# Patient Record
Sex: FEMALE | Race: WHITE | HISPANIC OR LATINO | Employment: FULL TIME
[De-identification: names, ages, dates, MRNs, and addresses within clinical notes are randomized per-mention and may not be internally consistent; named-entity substitution may affect disease eponyms.]

---

## 2019-06-07 ENCOUNTER — HOSPITAL (OUTPATIENT)
Dept: OTHER | Age: 34
End: 2019-06-07
Attending: OBSTETRICS & GYNECOLOGY

## 2019-10-11 ENCOUNTER — HOSPITAL (OUTPATIENT)
Dept: OTHER | Age: 34
End: 2019-10-11
Attending: FAMILY MEDICINE

## 2019-10-12 ENCOUNTER — HOSPITAL (OUTPATIENT)
Dept: OTHER | Age: 34
End: 2019-10-12
Attending: FAMILY MEDICINE

## 2020-01-24 ENCOUNTER — HOSPITAL (OUTPATIENT)
Dept: OTHER | Age: 35
End: 2020-01-24
Attending: EMERGENCY MEDICINE

## 2020-01-24 LAB
CONTROL LINE: PRESENT
Lab: CLEAR
URINE PREG POC: NEGATIVE

## 2020-03-20 ENCOUNTER — V-VISIT (OUTPATIENT)
Dept: FAMILY MEDICINE | Age: 35
End: 2020-03-20

## 2020-03-20 DIAGNOSIS — N93.9 VAGINAL BLEEDING: Primary | ICD-10-CM

## 2020-03-20 DIAGNOSIS — Z71.89 ADVICE GIVEN ABOUT COVID-19 VIRUS BY TELEPHONE: ICD-10-CM

## 2020-03-20 PROBLEM — J45.20 MILD INTERMITTENT ASTHMA WITHOUT COMPLICATION: Status: ACTIVE | Noted: 2020-03-20

## 2020-03-20 PROBLEM — E11.9 TYPE 2 DIABETES MELLITUS WITHOUT COMPLICATION (CMD): Status: ACTIVE | Noted: 2020-03-20

## 2020-03-20 PROBLEM — K51.90 ULCERATIVE COLITIS (CMD): Status: ACTIVE | Noted: 2020-03-20

## 2020-03-20 PROCEDURE — 99441 TELEPHONE E&M BY PHYSICIAN EST PT NOT ORIG PREV 7 DAYS 5-10 MIN: CPT | Performed by: NURSE PRACTITIONER

## 2020-03-20 RX ORDER — ALBUTEROL SULFATE 90 UG/1
2 AEROSOL, METERED RESPIRATORY (INHALATION) EVERY 4 HOURS PRN
COMMUNITY

## 2020-05-03 ENCOUNTER — HOSPITAL (OUTPATIENT)
Dept: OTHER | Age: 35
End: 2020-05-03
Attending: PHYSICIAN ASSISTANT

## 2020-05-03 LAB
ABS LYMPH: 2.4 K/CUMM (ref 1–3.5)
ABS MONO: 0.8 K/CUMM (ref 0.1–0.8)
ABS NEUTRO: 6 K/CUMM (ref 2–8)
BASOPHIL: 1 % (ref 0–1)
CONTROL LINE: PRESENT
DIFF_TYPE?: NORMAL
EOSINOPHIL: 3 % (ref 0–6)
HCG QUANT: <1 MIU/ML
HCT VFR BLD CALC: 43 % (ref 33–45)
HEMOLYSIS 4+: NEGATIVE
HGB BLD-MCNC: 14.1 G/DL (ref 11–15)
ICTERIC 4+: NEGATIVE
IMMATURE GRAN: 0.8 % (ref 0–0.3)
INSTR WBC: 9.7 K/CUMM (ref 4–11)
LIPEMIC 4+: NEGATIVE
LYMPHOCYTE: 24 %
Lab: CLEAR
MCH RBC QN AUTO: 28 PG (ref 25–35)
MCHC RBC AUTO-ENTMCNC: 33 G/DL (ref 32–37)
MCV RBC AUTO: 84 FL (ref 78–97)
MONOCYTE: 8 %
NEUTROPHIL: 62 %
NRBC BLD MANUAL-RTO: 0 % (ref 0–0.2)
PLATELET: 343 K/CUMM (ref 150–450)
RBC # BLD: 5.07 M/CUMM (ref 3.7–5.2)
RDW: 13.6 % (ref 11.5–14.5)
URINE PREG POC: NEGATIVE
WBC # BLD: 9.7 K/CUMM (ref 4–11)

## 2020-09-26 ENCOUNTER — HOSPITAL (OUTPATIENT)
Dept: OTHER | Age: 35
End: 2020-09-26
Attending: NURSE PRACTITIONER

## 2020-09-26 LAB
CHLAMYDIA PROBE: NEGATIVE
CONTROL LINE: PRESENT
CONTROL LINE: PRESENT
DEVICE SN: ABNORMAL
GC PROBE: NEGATIVE
Lab: CLEAR
Lab: CLEAR
N GON SOURCE: NORMAL
POC_GLU: 314 MG/DL (ref 70–99)
POC_GLU: 314 MG/DL (ref 70–99)
RMEPNL SOURCE: NORMAL
TECH_ID: ABNORMAL
TRICH POC: NORMAL
UA APPEAR POC: ABNORMAL
UA APPEAR POC: ABNORMAL
UA BILI POC: NEGATIVE
UA BILI POC: NEGATIVE
UA BLOOD POC: NEGATIVE
UA BLOOD POC: NEGATIVE
UA COLOR POC: YELLOW
UA COLOR POC: YELLOW
UA GLUCOSE POC: ABNORMAL
UA GLUCOSE POC: ABNORMAL
UA KETONES POC: ABNORMAL
UA KETONES POC: ABNORMAL
UA LEUK EST POC: NEGATIVE
UA LEUK EST POC: NEGATIVE
UA NITRITE POC: NEGATIVE
UA NITRITE POC: NEGATIVE
UA PH POC: 7 (ref 5–7)
UA PH POC: 7 (ref 5–7)
UA PROTEIN POC: NEGATIVE
UA PROTEIN POC: NEGATIVE
UA SPEC GRAV POC: 1.02 (ref 1.01–1.02)
UA SPEC GRAV POC: 1.02 (ref 1.01–1.02)
UA UROBILIN POC: 0.2 MG/DL
UA UROBILIN POC: 0.2 MG/DL
URINE PREG POC: NEGATIVE
URINE PREG POC: NEGATIVE

## 2020-09-30 LAB
CHLAMYDIA PROBE: NEGATIVE
GC PROBE: NEGATIVE
N GON SOURCE: NORMAL

## 2021-03-17 ENCOUNTER — WALK IN (OUTPATIENT)
Dept: URGENT CARE | Age: 36
End: 2021-03-17
Attending: FAMILY MEDICINE

## 2021-03-17 ENCOUNTER — HOSPITAL ENCOUNTER (OUTPATIENT)
Dept: GENERAL RADIOLOGY | Age: 36
Discharge: HOME | End: 2021-03-17
Attending: FAMILY MEDICINE

## 2021-03-17 DIAGNOSIS — S69.91XA INJURY OF RIGHT WRIST, INITIAL ENCOUNTER: ICD-10-CM

## 2021-03-17 DIAGNOSIS — S63.501A SPRAIN OF RIGHT WRIST, INITIAL ENCOUNTER: Primary | ICD-10-CM

## 2021-03-17 PROCEDURE — 99213 OFFICE O/P EST LOW 20 MIN: CPT

## 2021-03-17 PROCEDURE — 73110 X-RAY EXAM OF WRIST: CPT

## 2021-03-17 ASSESSMENT — PAIN SCALES - GENERAL: PAINLEVEL: 9-10

## 2021-03-20 ENCOUNTER — WALK IN (OUTPATIENT)
Dept: URGENT CARE | Age: 36
End: 2021-03-20
Attending: FAMILY MEDICINE

## 2021-03-20 DIAGNOSIS — N89.8 VAGINAL DISCHARGE: ICD-10-CM

## 2021-03-20 DIAGNOSIS — B00.9 HERPES SIMPLEX TYPE 2 INFECTION: Primary | ICD-10-CM

## 2021-03-20 DIAGNOSIS — R82.4 URINE KETONES: ICD-10-CM

## 2021-03-20 LAB
APPEARANCE, POC: CLEAR
B-HCG UR QL: NEGATIVE
BILIRUBIN, POC: NEGATIVE
COLOR, POC: YELLOW
FASTING STATUS PATIENT QL REPORTED: ABNORMAL
GLUCOSE SERPL-MCNC: 323 MG/DL (ref 65–99)
GLUCOSE UR-MCNC: NORMAL MG/DL
INTERNAL PROCEDURAL CONTROLS ACCEPTABLE: YES
KETONES, POC: NORMAL
NITRITE, POC: NEGATIVE
OCCULT BLOOD, POC: NORMAL
PH UR: 5.5 [PH] (ref 5–9)
PROT UR-MCNC: NEGATIVE G/DL
SP GR UR: 1.02 (ref 1–1.03)
UROBILINOGEN UR-MCNC: 0.2 MG/DL (ref 0–1)
WBC (LEUKOCYTE) ESTERASE, POC: NEGATIVE

## 2021-03-20 PROCEDURE — 87529 HSV DNA AMP PROBE: CPT | Performed by: PHYSICIAN ASSISTANT

## 2021-03-20 PROCEDURE — 81025 URINE PREGNANCY TEST: CPT | Performed by: PHYSICIAN ASSISTANT

## 2021-03-20 PROCEDURE — 82962 GLUCOSE BLOOD TEST: CPT | Performed by: PHYSICIAN ASSISTANT

## 2021-03-20 PROCEDURE — 81003 URINALYSIS AUTO W/O SCOPE: CPT | Performed by: PHYSICIAN ASSISTANT

## 2021-03-20 PROCEDURE — 99213 OFFICE O/P EST LOW 20 MIN: CPT

## 2021-03-20 RX ORDER — VALACYCLOVIR HYDROCHLORIDE 1 G/1
1000 TABLET, FILM COATED ORAL 3 TIMES DAILY
Qty: 21 TABLET | Refills: 0 | Status: SHIPPED | OUTPATIENT
Start: 2021-03-20 | End: 2021-03-27

## 2021-03-20 ASSESSMENT — PAIN SCALES - GENERAL: PAINLEVEL: 9-10

## 2021-03-21 LAB
HSV1 DNA SPEC QL NAA+PROBE: NOT DETECTED
HSV2 DNA SPEC QL NAA+PROBE: POSITIVE
SERVICE CMNT-IMP: ABNORMAL

## 2021-05-26 VITALS
HEART RATE: 98 BPM | WEIGHT: 152 LBS | OXYGEN SATURATION: 99 % | RESPIRATION RATE: 20 BRPM | HEART RATE: 86 BPM | SYSTOLIC BLOOD PRESSURE: 124 MMHG | BODY MASS INDEX: 30.64 KG/M2 | DIASTOLIC BLOOD PRESSURE: 80 MMHG | RESPIRATION RATE: 18 BRPM | SYSTOLIC BLOOD PRESSURE: 137 MMHG | OXYGEN SATURATION: 99 % | HEIGHT: 59 IN | TEMPERATURE: 98 F | DIASTOLIC BLOOD PRESSURE: 89 MMHG | TEMPERATURE: 97.8 F

## 2021-06-09 ENCOUNTER — APPOINTMENT (OUTPATIENT)
Dept: CT IMAGING | Facility: HOSPITAL | Age: 36
End: 2021-06-09
Attending: EMERGENCY MEDICINE
Payer: MEDICAID

## 2021-06-09 ENCOUNTER — HOSPITAL ENCOUNTER (EMERGENCY)
Facility: HOSPITAL | Age: 36
Discharge: HOME | End: 2021-06-09
Attending: EMERGENCY MEDICINE
Payer: MEDICAID

## 2021-06-09 VITALS
OXYGEN SATURATION: 99 % | RESPIRATION RATE: 18 BRPM | DIASTOLIC BLOOD PRESSURE: 72 MMHG | HEART RATE: 66 BPM | HEIGHT: 59 IN | BODY MASS INDEX: 31.04 KG/M2 | TEMPERATURE: 97 F | WEIGHT: 154 LBS | SYSTOLIC BLOOD PRESSURE: 111 MMHG

## 2021-06-09 DIAGNOSIS — R10.9 ABDOMINAL PAIN, ACUTE: Primary | ICD-10-CM

## 2021-06-09 DIAGNOSIS — R11.2 NAUSEA AND VOMITING, INTRACTABILITY OF VOMITING NOT SPECIFIED, UNSPECIFIED VOMITING TYPE: ICD-10-CM

## 2021-06-09 DIAGNOSIS — R19.7 DIARRHEA, UNSPECIFIED TYPE: ICD-10-CM

## 2021-06-09 PROCEDURE — 81025 URINE PREGNANCY TEST: CPT

## 2021-06-09 PROCEDURE — 96374 THER/PROPH/DIAG INJ IV PUSH: CPT

## 2021-06-09 PROCEDURE — 82962 GLUCOSE BLOOD TEST: CPT

## 2021-06-09 PROCEDURE — 85025 COMPLETE CBC W/AUTO DIFF WBC: CPT | Performed by: EMERGENCY MEDICINE

## 2021-06-09 PROCEDURE — 83690 ASSAY OF LIPASE: CPT | Performed by: EMERGENCY MEDICINE

## 2021-06-09 PROCEDURE — 96372 THER/PROPH/DIAG INJ SC/IM: CPT

## 2021-06-09 PROCEDURE — 99284 EMERGENCY DEPT VISIT MOD MDM: CPT

## 2021-06-09 PROCEDURE — 74177 CT ABD & PELVIS W/CONTRAST: CPT | Performed by: EMERGENCY MEDICINE

## 2021-06-09 PROCEDURE — 96361 HYDRATE IV INFUSION ADD-ON: CPT

## 2021-06-09 PROCEDURE — 80053 COMPREHEN METABOLIC PANEL: CPT | Performed by: EMERGENCY MEDICINE

## 2021-06-09 PROCEDURE — 81001 URINALYSIS AUTO W/SCOPE: CPT | Performed by: EMERGENCY MEDICINE

## 2021-06-09 RX ORDER — ONDANSETRON 4 MG/1
4 TABLET, ORALLY DISINTEGRATING ORAL EVERY 4 HOURS PRN
Qty: 10 TABLET | Refills: 0 | Status: SHIPPED | OUTPATIENT
Start: 2021-06-09 | End: 2021-06-16

## 2021-06-09 RX ORDER — SODIUM CHLORIDE 9 MG/ML
1000 INJECTION, SOLUTION INTRAVENOUS ONCE
Status: COMPLETED | OUTPATIENT
Start: 2021-06-09 | End: 2021-06-09

## 2021-06-09 RX ORDER — DICYCLOMINE HYDROCHLORIDE 10 MG/ML
20 INJECTION INTRAMUSCULAR ONCE
Status: COMPLETED | OUTPATIENT
Start: 2021-06-09 | End: 2021-06-09

## 2021-06-09 RX ORDER — ONDANSETRON 2 MG/ML
4 INJECTION INTRAMUSCULAR; INTRAVENOUS ONCE
Status: COMPLETED | OUTPATIENT
Start: 2021-06-09 | End: 2021-06-09

## 2021-06-09 RX ORDER — DICYCLOMINE HCL 20 MG
20 TABLET ORAL 4 TIMES DAILY PRN
Qty: 15 TABLET | Refills: 0 | Status: SHIPPED | OUTPATIENT
Start: 2021-06-09

## 2021-06-09 NOTE — ED PROVIDER NOTES
Patient Seen in: [facility] Emergency Department      History   Patient presents with:  Abdominal Pain    Stated Complaint: abd pain     HPI/Subjective:   HPI    Patient is a 43-year-old female presents emergency room with a history of nausea vomiti Exam  GENERAL: Well-developed, well-nourished female sitting up breathing easily in no apparent distress. Patient is nontoxic in appearance. HEENT: Head is normocephalic, atraumatic. Pupils are 3 mm equally round and reactive to light.  Oropharynx is brielle orders were created for panel order CBC With Differential With Platelet.   Procedure                               Abnormality         Status                     ---------                               -----------         ------                     CBC W/ D vomiting not specified, unspecified vomiting type  Diarrhea, unspecified type     Disposition:  Discharge  6/9/2021 12:56 pm    Follow-up:  Jhonny Yancey MD  [address] [place] [address] [phone]    Call in 2 days  or call y

## 2021-07-18 ENCOUNTER — HOSPITAL ENCOUNTER (OUTPATIENT)
Dept: GENERAL RADIOLOGY | Age: 36
Discharge: HOME | End: 2021-07-18
Attending: FAMILY MEDICINE

## 2021-07-18 ENCOUNTER — WALK IN (OUTPATIENT)
Dept: URGENT CARE | Age: 36
End: 2021-07-18
Attending: FAMILY MEDICINE

## 2021-07-18 VITALS
OXYGEN SATURATION: 99 % | RESPIRATION RATE: 20 BRPM | SYSTOLIC BLOOD PRESSURE: 118 MMHG | DIASTOLIC BLOOD PRESSURE: 77 MMHG | HEART RATE: 84 BPM | TEMPERATURE: 97.8 F

## 2021-07-18 DIAGNOSIS — M25.562 ACUTE PAIN OF LEFT KNEE: ICD-10-CM

## 2021-07-18 DIAGNOSIS — M25.562 ACUTE PAIN OF LEFT KNEE: Primary | ICD-10-CM

## 2021-07-18 PROCEDURE — 73562 X-RAY EXAM OF KNEE 3: CPT

## 2021-07-18 PROCEDURE — 99213 OFFICE O/P EST LOW 20 MIN: CPT

## 2021-07-18 RX ORDER — TOPIRAMATE 100 MG
100 TABLET ORAL DAILY
COMMUNITY
Start: 2021-06-28

## 2021-07-18 RX ORDER — INSULIN LISPRO 100 [IU]/ML
INJECTION, SOLUTION INTRAVENOUS; SUBCUTANEOUS
COMMUNITY
Start: 2021-07-06

## 2021-07-18 RX ORDER — FLUTICASONE PROPIONATE 50 MCG
SPRAY, SUSPENSION (ML) NASAL
COMMUNITY
Start: 2021-06-08

## 2021-07-18 RX ORDER — LORATADINE 10 MG/1
10 TABLET ORAL DAILY PRN
COMMUNITY
Start: 2021-06-08

## 2021-07-18 RX ORDER — ERGOCALCIFEROL 1.25 MG/1
CAPSULE ORAL
COMMUNITY
Start: 2021-07-11

## 2021-07-18 ASSESSMENT — ENCOUNTER SYMPTOMS
VOMITING: 0
SHORTNESS OF BREATH: 0
POLYPHAGIA: 0
CONSTIPATION: 0
BACK PAIN: 0
HEADACHES: 0
POLYDIPSIA: 0
NERVOUS/ANXIOUS: 0
WOUND: 0
VOICE CHANGE: 0
BRUISES/BLEEDS EASILY: 0
COLOR CHANGE: 0
SINUS PAIN: 0
SORE THROAT: 0
ABDOMINAL PAIN: 0
EYE DISCHARGE: 0
EYE PAIN: 0
SPEECH DIFFICULTY: 0
FATIGUE: 0
DIARRHEA: 0
EYE REDNESS: 0
WEAKNESS: 0
ADENOPATHY: 0
FEVER: 0
DIZZINESS: 0
CHEST TIGHTNESS: 0
NAUSEA: 0
BLOOD IN STOOL: 0
COUGH: 0
WHEEZING: 0
SINUS PRESSURE: 0
STRIDOR: 0
AGITATION: 0

## 2021-07-18 ASSESSMENT — PAIN SCALES - GENERAL: PAINLEVEL: 7

## 2021-07-20 ENCOUNTER — WALK IN (OUTPATIENT)
Dept: URGENT CARE | Age: 36
End: 2021-07-20
Attending: FAMILY MEDICINE

## 2021-07-20 VITALS
SYSTOLIC BLOOD PRESSURE: 135 MMHG | WEIGHT: 151 LBS | BODY MASS INDEX: 30.44 KG/M2 | DIASTOLIC BLOOD PRESSURE: 92 MMHG | RESPIRATION RATE: 18 BRPM | OXYGEN SATURATION: 99 % | HEART RATE: 97 BPM | TEMPERATURE: 98.1 F | HEIGHT: 59 IN

## 2021-07-20 DIAGNOSIS — Q68.2 PATELLA ALTA: Primary | ICD-10-CM

## 2021-07-20 PROCEDURE — 29505 APPLICATION LONG LEG SPLINT: CPT

## 2021-07-20 PROCEDURE — 99213 OFFICE O/P EST LOW 20 MIN: CPT

## 2021-07-20 ASSESSMENT — ENCOUNTER SYMPTOMS
PSYCHIATRIC NEGATIVE: 1
HEMATOLOGIC/LYMPHATIC NEGATIVE: 1
SHORTNESS OF BREATH: 0
ENDOCRINE NEGATIVE: 1
EYE DISCHARGE: 0
CHEST TIGHTNESS: 0
WEAKNESS: 0
ABDOMINAL PAIN: 0
CHILLS: 0
DIZZINESS: 0
FEVER: 0
VOMITING: 0
NAUSEA: 0

## 2021-07-20 ASSESSMENT — PAIN SCALES - GENERAL: PAINLEVEL: 8

## 2021-07-20 ASSESSMENT — VISUAL ACUITY: OU: 1

## 2021-09-20 ENCOUNTER — HOSPITAL ENCOUNTER (EMERGENCY)
Facility: HOSPITAL | Age: 36
Discharge: HOME | End: 2021-09-20
Attending: EMERGENCY MEDICINE
Payer: MEDICAID

## 2021-09-20 VITALS
BODY MASS INDEX: 31.85 KG/M2 | HEART RATE: 86 BPM | HEIGHT: 59 IN | DIASTOLIC BLOOD PRESSURE: 88 MMHG | OXYGEN SATURATION: 100 % | SYSTOLIC BLOOD PRESSURE: 128 MMHG | TEMPERATURE: 98 F | WEIGHT: 158 LBS | RESPIRATION RATE: 18 BRPM

## 2021-09-20 VITALS
OXYGEN SATURATION: 100 % | TEMPERATURE: 98 F | DIASTOLIC BLOOD PRESSURE: 91 MMHG | BODY MASS INDEX: 31.85 KG/M2 | RESPIRATION RATE: 18 BRPM | WEIGHT: 158 LBS | HEIGHT: 59 IN | HEART RATE: 90 BPM | SYSTOLIC BLOOD PRESSURE: 146 MMHG

## 2021-09-20 DIAGNOSIS — N93.8 DYSFUNCTIONAL UTERINE BLEEDING: Primary | ICD-10-CM

## 2021-09-20 LAB
ALBUMIN SERPL-MCNC: 3.3 G/DL (ref 3.4–5)
ALBUMIN/GLOB SERPL: 0.8 {RATIO} (ref 1–2)
ALP LIVER SERPL-CCNC: 75 U/L
ALT SERPL-CCNC: 29 U/L
ANION GAP SERPL CALC-SCNC: 7 MMOL/L (ref 0–18)
AST SERPL-CCNC: 17 U/L (ref 15–37)
BASOPHILS # BLD AUTO: 0.08 X10(3) UL (ref 0–0.2)
BASOPHILS NFR BLD AUTO: 0.8 %
BILIRUB SERPL-MCNC: 0.3 MG/DL (ref 0.1–2)
BUN BLD-MCNC: 7 MG/DL (ref 7–18)
CALCIUM BLD-MCNC: 8.6 MG/DL (ref 8.5–10.1)
CHLORIDE SERPL-SCNC: 106 MMOL/L (ref 98–112)
CO2 SERPL-SCNC: 23 MMOL/L (ref 21–32)
CREAT BLD-MCNC: 0.57 MG/DL
EOSINOPHIL # BLD AUTO: 0.33 X10(3) UL (ref 0–0.7)
EOSINOPHIL NFR BLD AUTO: 3.2 %
ERYTHROCYTE [DISTWIDTH] IN BLOOD BY AUTOMATED COUNT: 12.9 %
GLOBULIN PLAS-MCNC: 3.9 G/DL (ref 2.8–4.4)
GLUCOSE BLD-MCNC: 235 MG/DL (ref 70–99)
HCT VFR BLD AUTO: 33.7 %
HGB BLD-MCNC: 11.3 G/DL
IMM GRANULOCYTES # BLD AUTO: 0.07 X10(3) UL (ref 0–1)
IMM GRANULOCYTES NFR BLD: 0.7 %
LYMPHOCYTES # BLD AUTO: 2.85 X10(3) UL (ref 1–4)
LYMPHOCYTES NFR BLD AUTO: 27.9 %
MCH RBC QN AUTO: 27.9 PG (ref 26–34)
MCHC RBC AUTO-ENTMCNC: 33.5 G/DL (ref 31–37)
MCV RBC AUTO: 83.2 FL
MONOCYTES # BLD AUTO: 0.56 X10(3) UL (ref 0.1–1)
MONOCYTES NFR BLD AUTO: 5.5 %
NEUTROPHILS # BLD AUTO: 6.33 X10 (3) UL (ref 1.5–7.7)
NEUTROPHILS # BLD AUTO: 6.33 X10(3) UL (ref 1.5–7.7)
NEUTROPHILS NFR BLD AUTO: 61.9 %
OSMOLALITY SERPL CALC.SUM OF ELEC: 288 MOSM/KG (ref 275–295)
PLATELET # BLD AUTO: 344 10(3)UL (ref 150–450)
POTASSIUM SERPL-SCNC: 3.9 MMOL/L (ref 3.5–5.1)
PROT SERPL-MCNC: 7.2 G/DL (ref 6.4–8.2)
RBC # BLD AUTO: 4.05 X10(6)UL
SODIUM SERPL-SCNC: 136 MMOL/L (ref 136–145)
WBC # BLD AUTO: 10.2 X10(3) UL (ref 4–11)

## 2021-09-20 PROCEDURE — 85025 COMPLETE CBC W/AUTO DIFF WBC: CPT | Performed by: EMERGENCY MEDICINE

## 2021-09-20 PROCEDURE — 99284 EMERGENCY DEPT VISIT MOD MDM: CPT

## 2021-09-20 PROCEDURE — 96360 HYDRATION IV INFUSION INIT: CPT

## 2021-09-20 PROCEDURE — 80053 COMPREHEN METABOLIC PANEL: CPT | Performed by: EMERGENCY MEDICINE

## 2021-09-20 RX ORDER — ERGOCALCIFEROL 1.25 MG/1
CAPSULE ORAL
COMMUNITY

## 2021-09-20 RX ORDER — INSULIN LISPRO 100 [IU]/ML
5 INJECTION, SOLUTION INTRAVENOUS; SUBCUTANEOUS
COMMUNITY

## 2021-09-20 NOTE — ED PROVIDER NOTES
Patient Seen in: [facility] Emergency Department      History   Patient presents with:  Eval-G    Stated Complaint: vag bleeding since June    Subjective:   HPI    41-year-old female comes to the hospital complaint of having difficulty with having v supple, no JVD, trachea midline, No LAD  Heart: S1S2 normal. No murmurs, regular rate and rhythm  Lungs: Clear to auscultation bilaterally  Abdomen: Soft nontender nondistended normal active bowel sounds without rebound, guarding or masses noted  Back nont customary discharge instuctions were discussed given the patient's ER course. We discussed signs and symptoms that should prompt the patient's immediate return to the emergency department.    Reasonable over the counter and prescription treatment options a

## 2021-09-21 NOTE — ED INITIAL ASSESSMENT (HPI)
Pt with c/o lower abd pain with dry heaving. Seen here this morning for bleeding. Due to have bx on Wed this wk. .increased abd pain

## 2022-01-24 ENCOUNTER — HOSPITAL ENCOUNTER (EMERGENCY)
Facility: HOSPITAL | Age: 37
Discharge: HOME | End: 2022-01-24
Attending: EMERGENCY MEDICINE
Payer: MEDICAID

## 2022-01-24 ENCOUNTER — APPOINTMENT (OUTPATIENT)
Dept: CT IMAGING | Facility: HOSPITAL | Age: 37
End: 2022-01-24
Attending: EMERGENCY MEDICINE
Payer: MEDICAID

## 2022-01-24 VITALS
HEIGHT: 59 IN | BODY MASS INDEX: 32.66 KG/M2 | HEART RATE: 80 BPM | WEIGHT: 162 LBS | OXYGEN SATURATION: 100 % | RESPIRATION RATE: 17 BRPM | TEMPERATURE: 98 F | SYSTOLIC BLOOD PRESSURE: 125 MMHG | DIASTOLIC BLOOD PRESSURE: 86 MMHG

## 2022-01-24 DIAGNOSIS — F41.8 SITUATIONAL ANXIETY: ICD-10-CM

## 2022-01-24 DIAGNOSIS — R06.00 DYSPNEA ON EXERTION: Primary | ICD-10-CM

## 2022-01-24 LAB
ATRIAL RATE: 79 BPM
B-HCG UR QL: NEGATIVE
BASOPHILS # BLD AUTO: 0.11 X10(3) UL (ref 0–0.2)
BASOPHILS NFR BLD AUTO: 0.9 %
D DIMER PPP FEU-MCNC: <0.27 UG/ML FEU (ref ?–0.5)
EOSINOPHIL # BLD AUTO: 0.18 X10(3) UL (ref 0–0.7)
EOSINOPHIL NFR BLD AUTO: 1.5 %
ERYTHROCYTE [DISTWIDTH] IN BLOOD BY AUTOMATED COUNT: 18.7 %
HCT VFR BLD AUTO: 41 %
HGB BLD-MCNC: 13.1 G/DL
IMM GRANULOCYTES # BLD AUTO: 0.1 X10(3) UL (ref 0–1)
IMM GRANULOCYTES NFR BLD: 0.8 %
LYMPHOCYTES # BLD AUTO: 3.2 X10(3) UL (ref 1–4)
LYMPHOCYTES NFR BLD AUTO: 26.7 %
MCH RBC QN AUTO: 23.9 PG (ref 26–34)
MCHC RBC AUTO-ENTMCNC: 32 G/DL (ref 31–37)
MCV RBC AUTO: 74.7 FL
MONOCYTES # BLD AUTO: 0.78 X10(3) UL (ref 0.1–1)
MONOCYTES NFR BLD AUTO: 6.5 %
NEUTROPHILS # BLD AUTO: 7.6 X10 (3) UL (ref 1.5–7.7)
NEUTROPHILS # BLD AUTO: 7.6 X10(3) UL (ref 1.5–7.7)
NEUTROPHILS NFR BLD AUTO: 63.6 %
P AXIS: 37 DEGREES
P-R INTERVAL: 140 MS
PLATELET # BLD AUTO: 433 10(3)UL (ref 150–450)
Q-T INTERVAL: 398 MS
QRS DURATION: 96 MS
QTC CALCULATION (BEZET): 456 MS
R AXIS: 15 DEGREES
RBC # BLD AUTO: 5.49 X10(6)UL
T AXIS: -3 DEGREES
VENTRICULAR RATE: 79 BPM
WBC # BLD AUTO: 12 X10(3) UL (ref 4–11)

## 2022-01-24 PROCEDURE — 84484 ASSAY OF TROPONIN QUANT: CPT | Performed by: EMERGENCY MEDICINE

## 2022-01-24 PROCEDURE — 71275 CT ANGIOGRAPHY CHEST: CPT | Performed by: EMERGENCY MEDICINE

## 2022-01-24 PROCEDURE — 93010 ELECTROCARDIOGRAM REPORT: CPT | Performed by: EMERGENCY MEDICINE

## 2022-01-24 PROCEDURE — 81025 URINE PREGNANCY TEST: CPT

## 2022-01-24 PROCEDURE — 80053 COMPREHEN METABOLIC PANEL: CPT | Performed by: EMERGENCY MEDICINE

## 2022-01-24 PROCEDURE — 99284 EMERGENCY DEPT VISIT MOD MDM: CPT | Performed by: EMERGENCY MEDICINE

## 2022-01-24 PROCEDURE — 85379 FIBRIN DEGRADATION QUANT: CPT | Performed by: EMERGENCY MEDICINE

## 2022-01-24 PROCEDURE — 85025 COMPLETE CBC W/AUTO DIFF WBC: CPT | Performed by: EMERGENCY MEDICINE

## 2022-01-24 PROCEDURE — 93005 ELECTROCARDIOGRAM TRACING: CPT

## 2022-01-24 PROCEDURE — 36415 COLL VENOUS BLD VENIPUNCTURE: CPT | Performed by: EMERGENCY MEDICINE

## 2022-01-24 RX ORDER — ALBUTEROL SULFATE 90 UG/1
AEROSOL, METERED RESPIRATORY (INHALATION) EVERY 6 HOURS PRN
COMMUNITY

## 2022-01-24 RX ORDER — IPRATROPIUM BROMIDE AND ALBUTEROL SULFATE 2.5; .5 MG/3ML; MG/3ML
3 SOLUTION RESPIRATORY (INHALATION)
COMMUNITY

## 2022-01-24 RX ORDER — IOHEXOL 350 MG/ML
100 INJECTION, SOLUTION INTRAVENOUS
Status: COMPLETED | OUTPATIENT
Start: 2022-01-24 | End: 2022-01-24

## 2022-01-24 NOTE — ED QUICK NOTES
Pt awake and alert, skin w/d,resps reg/shallow intermittently. Pt noted to have regular respirations when asked to stay still for testing. sats remain WNL. Per Dr. Sukhdeep Mitchell, will waive BUN/creatinine for CT.     Pt to bathroom with steady gait for clean

## 2022-01-24 NOTE — ED QUICK NOTES
Pt awake and alert, skin w/d,resps appear unlabored. Pt instructed to push the fluids in order to flush her kidneys. Pt verbalized understanding. Pt with steady gait.

## 2022-01-24 NOTE — ED PROVIDER NOTES
Patient Seen in: [facility] Emergency Department      History   Patient presents with:  Covid  Difficulty Breathing    Stated Complaint: dyspnea, covid + on 1/14    Subjective:   HPI    36-year-old woman who is a history of insulin-dependent diabete 12/24/2021   SpO2 100%   BMI 32.72 kg/m²         Physical Exam    General:  Vitals as listed. No acute distress. Somewhat anxious appearing. Seems to hyperventilate and less being distracted with conversation or examination. HEENT: Sclerae anicteric. generated. Dose reduction techniques were used. Dose information is transmitted to the ACR [facility] [employer]) NRDR ([address]) which includes the Dose Index Registry.   PATIENT STATED HISTORY:(As transcribed by Gurpreet (primary encounter diagnosis)  Situational anxiety     Disposition:  Discharge  1/24/2022 10:52 am    Follow-up:  Sherrell Cooks, [address] [facility] [address] [phone]    In 3 days  . .. or your usual primary care doctor          Medication

## 2022-01-24 NOTE — ED INITIAL ASSESSMENT (HPI)
Pt presents to the ED with complaints of feeling short of breath since Thursday. Pt was dx with covid on 1/14 and was seen at [facility] for the same complaint on Thursday.  Pt was told at that time she had some ekg changes and to follow up with pulmonary and ca

## 2022-11-14 ENCOUNTER — OFFICE VISIT (OUTPATIENT)
Dept: RHEUMATOLOGY | Facility: CLINIC | Age: 37
End: 2022-11-14
Payer: MEDICAID

## 2022-11-14 VITALS
DIASTOLIC BLOOD PRESSURE: 76 MMHG | HEIGHT: 59 IN | BODY MASS INDEX: 34.68 KG/M2 | RESPIRATION RATE: 16 BRPM | SYSTOLIC BLOOD PRESSURE: 121 MMHG | OXYGEN SATURATION: 99 % | HEART RATE: 83 BPM | WEIGHT: 172 LBS | TEMPERATURE: 98 F

## 2022-11-14 DIAGNOSIS — Z11.59 NEED FOR HEPATITIS C SCREENING TEST: ICD-10-CM

## 2022-11-14 DIAGNOSIS — M19.90 INFLAMMATORY ARTHRITIS: Primary | ICD-10-CM

## 2022-11-14 DIAGNOSIS — Z11.1 SCREENING FOR TUBERCULOSIS: ICD-10-CM

## 2022-11-14 DIAGNOSIS — E11.49 OTHER DIABETIC NEUROLOGICAL COMPLICATION ASSOCIATED WITH TYPE 2 DIABETES MELLITUS (HCC): ICD-10-CM

## 2022-11-14 DIAGNOSIS — Z11.59 NEED FOR HEPATITIS B SCREENING TEST: ICD-10-CM

## 2022-11-14 DIAGNOSIS — G56.03 BILATERAL CARPAL TUNNEL SYNDROME: ICD-10-CM

## 2022-11-14 DIAGNOSIS — Z51.81 THERAPEUTIC DRUG MONITORING: ICD-10-CM

## 2022-11-14 DIAGNOSIS — M47.819 SPONDYLOARTHRITIS: ICD-10-CM

## 2022-11-14 NOTE — PATIENT INSTRUCTIONS
For carpal tunnel syndrome, use wrist splints to be worn nightly. [employer] has the best deals. -Get blood work. Call THE [facility] scheduling line to set the lab/imaging/procedure appointment up. Phone number is [phone].     You can also try to schedule online at: [email or web address]

## 2022-11-20 ENCOUNTER — TELEPHONE (OUTPATIENT)
Dept: RHEUMATOLOGY | Facility: CLINIC | Age: 37
End: 2022-11-20

## 2022-11-20 ENCOUNTER — LAB ENCOUNTER (OUTPATIENT)
Dept: LAB | Facility: HOSPITAL | Age: 37
End: 2022-11-20
Attending: INTERNAL MEDICINE
Payer: MEDICAID

## 2022-11-20 DIAGNOSIS — M19.90 INFLAMMATORY ARTHRITIS: ICD-10-CM

## 2022-11-20 DIAGNOSIS — Z11.1 SCREENING FOR TUBERCULOSIS: ICD-10-CM

## 2022-11-20 DIAGNOSIS — R74.01 ELEVATED ALT MEASUREMENT: ICD-10-CM

## 2022-11-20 DIAGNOSIS — Z11.59 NEED FOR HEPATITIS B SCREENING TEST: ICD-10-CM

## 2022-11-20 DIAGNOSIS — M47.819 SPONDYLOARTHRITIS: ICD-10-CM

## 2022-11-20 DIAGNOSIS — Z51.81 THERAPEUTIC DRUG MONITORING: ICD-10-CM

## 2022-11-20 DIAGNOSIS — E11.49 OTHER DIABETIC NEUROLOGICAL COMPLICATION ASSOCIATED WITH TYPE 2 DIABETES MELLITUS (HCC): ICD-10-CM

## 2022-11-20 DIAGNOSIS — R79.89 LOW VITAMIN D LEVEL: Primary | ICD-10-CM

## 2022-11-20 LAB
ALBUMIN SERPL-MCNC: 3.5 G/DL (ref 3.4–5)
ALBUMIN/GLOB SERPL: 0.8 {RATIO} (ref 1–2)
ALP LIVER SERPL-CCNC: 79 U/L
ALT SERPL-CCNC: 90 U/L
ANION GAP SERPL CALC-SCNC: 1 MMOL/L (ref 0–18)
AST SERPL-CCNC: 37 U/L (ref 15–37)
BASOPHILS # BLD AUTO: 0.06 X10(3) UL (ref 0–0.2)
BASOPHILS NFR BLD AUTO: 0.7 %
BILIRUB SERPL-MCNC: 0.3 MG/DL (ref 0.1–2)
BUN BLD-MCNC: 9 MG/DL (ref 7–18)
CALCIUM BLD-MCNC: 8.6 MG/DL (ref 8.5–10.1)
CHLORIDE SERPL-SCNC: 108 MMOL/L (ref 98–112)
CK SERPL-CCNC: 58 U/L
CO2 SERPL-SCNC: 27 MMOL/L (ref 21–32)
CREAT BLD-MCNC: 0.59 MG/DL
CRP SERPL-MCNC: 0.91 MG/DL (ref ?–0.3)
DEPRECATED HBV CORE AB SER IA-ACNC: 103.1 NG/ML
EOSINOPHIL # BLD AUTO: 0.17 X10(3) UL (ref 0–0.7)
EOSINOPHIL NFR BLD AUTO: 1.9 %
ERYTHROCYTE [DISTWIDTH] IN BLOOD BY AUTOMATED COUNT: 13.1 %
ERYTHROCYTE [SEDIMENTATION RATE] IN BLOOD: 50 MM/HR
FASTING STATUS PATIENT QL REPORTED: YES
GFR SERPLBLD BASED ON 1.73 SQ M-ARVRAT: 120 ML/MIN/1.73M2 (ref 60–?)
GLOBULIN PLAS-MCNC: 4.2 G/DL (ref 2.8–4.4)
GLUCOSE BLD-MCNC: 117 MG/DL (ref 70–99)
HBV CORE AB SERPL QL IA: NONREACTIVE
HBV SURFACE AB SER QL: NONREACTIVE
HBV SURFACE AB SERPL IA-ACNC: <3.1 MIU/ML
HCT VFR BLD AUTO: 45.4 %
HGB BLD-MCNC: 14.8 G/DL
IMM GRANULOCYTES # BLD AUTO: 0.04 X10(3) UL (ref 0–1)
IMM GRANULOCYTES NFR BLD: 0.4 %
LYMPHOCYTES # BLD AUTO: 4.26 X10(3) UL (ref 1–4)
LYMPHOCYTES NFR BLD AUTO: 46.8 %
MCH RBC QN AUTO: 27.9 PG (ref 26–34)
MCHC RBC AUTO-ENTMCNC: 32.6 G/DL (ref 31–37)
MCV RBC AUTO: 85.5 FL
MONOCYTES # BLD AUTO: 0.56 X10(3) UL (ref 0.1–1)
MONOCYTES NFR BLD AUTO: 6.2 %
NEUTROPHILS # BLD AUTO: 4.01 X10 (3) UL (ref 1.5–7.7)
NEUTROPHILS # BLD AUTO: 4.01 X10(3) UL (ref 1.5–7.7)
NEUTROPHILS NFR BLD AUTO: 44 %
OSMOLALITY SERPL CALC.SUM OF ELEC: 282 MOSM/KG (ref 275–295)
PLATELET # BLD AUTO: 310 10(3)UL (ref 150–450)
POTASSIUM SERPL-SCNC: 4 MMOL/L (ref 3.5–5.1)
PROT SERPL-MCNC: 7.7 G/DL (ref 6.4–8.2)
RBC # BLD AUTO: 5.31 X10(6)UL
RHEUMATOID FACT SERPL-ACNC: <10 IU/ML (ref ?–15)
SODIUM SERPL-SCNC: 136 MMOL/L (ref 136–145)
TSI SER-ACNC: 2.24 MIU/ML (ref 0.36–3.74)
URATE SERPL-MCNC: 3.9 MG/DL
VIT B12 SERPL-MCNC: 675 PG/ML (ref 193–986)
VIT D+METAB SERPL-MCNC: 13.3 NG/ML (ref 30–100)
WBC # BLD AUTO: 9.1 X10(3) UL (ref 4–11)

## 2022-11-20 PROCEDURE — 86706 HEP B SURFACE ANTIBODY: CPT

## 2022-11-20 PROCEDURE — 82607 VITAMIN B-12: CPT

## 2022-11-20 PROCEDURE — 86480 TB TEST CELL IMMUN MEASURE: CPT

## 2022-11-20 PROCEDURE — 83516 IMMUNOASSAY NONANTIBODY: CPT

## 2022-11-20 PROCEDURE — 86038 ANTINUCLEAR ANTIBODIES: CPT

## 2022-11-20 PROCEDURE — 80053 COMPREHEN METABOLIC PANEL: CPT

## 2022-11-20 PROCEDURE — 36415 COLL VENOUS BLD VENIPUNCTURE: CPT

## 2022-11-20 PROCEDURE — 86200 CCP ANTIBODY: CPT

## 2022-11-20 PROCEDURE — 86140 C-REACTIVE PROTEIN: CPT

## 2022-11-20 PROCEDURE — 85025 COMPLETE CBC W/AUTO DIFF WBC: CPT

## 2022-11-20 PROCEDURE — 82550 ASSAY OF CK (CPK): CPT

## 2022-11-20 PROCEDURE — 86225 DNA ANTIBODY NATIVE: CPT

## 2022-11-20 PROCEDURE — 85652 RBC SED RATE AUTOMATED: CPT

## 2022-11-20 PROCEDURE — 84550 ASSAY OF BLOOD/URIC ACID: CPT

## 2022-11-20 PROCEDURE — 86431 RHEUMATOID FACTOR QUANT: CPT

## 2022-11-20 PROCEDURE — 81374 HLA I TYPING 1 ANTIGEN LR: CPT

## 2022-11-20 PROCEDURE — 86036 ANCA SCREEN EACH ANTIBODY: CPT

## 2022-11-20 PROCEDURE — 86704 HEP B CORE ANTIBODY TOTAL: CPT

## 2022-11-20 PROCEDURE — 82955 ASSAY OF G6PD ENZYME: CPT

## 2022-11-20 PROCEDURE — 82306 VITAMIN D 25 HYDROXY: CPT

## 2022-11-20 PROCEDURE — 84443 ASSAY THYROID STIM HORMONE: CPT

## 2022-11-20 PROCEDURE — 82728 ASSAY OF FERRITIN: CPT

## 2022-11-20 RX ORDER — ERGOCALCIFEROL 1.25 MG/1
50000 CAPSULE ORAL WEEKLY
Qty: 12 CAPSULE | Refills: 0 | Status: SHIPPED | OUTPATIENT
Start: 2022-11-20 | End: 2023-02-12

## 2022-11-21 LAB
DSDNA IGG SERPL IA-ACNC: <0.6 IU/ML
ENA AB SER QL IA: 0.2 UG/L
ENA AB SER QL IA: NEGATIVE

## 2022-11-22 LAB
CCP IGG SERPL-ACNC: 0.5 U/ML (ref 0–6.9)
GLUCOSE-6-PHOSPHATE DEHYDROGENASE: 14.9 U/G HB
M TB IFN-G CD4+ T-CELLS BLD-ACNC: 0.03 IU/ML
M TB TUBERC IFN-G BLD QL: NEGATIVE
M TB TUBERC IGNF/MITOGEN IGNF CONTROL: >10 IU/ML
QFT TB1 AG MINUS NIL: 0 IU/ML
QFT TB2 AG MINUS NIL: 0 IU/ML

## 2022-11-23 LAB — HLA-B27: NEGATIVE

## 2022-11-25 LAB
MYELOPEROX ANTIBODIES, IGG: 0 AU/ML
SERINE PROTEASE 3, IGG: 0 AU/ML

## 2022-12-06 ENCOUNTER — OFFICE VISIT (OUTPATIENT)
Dept: RHEUMATOLOGY | Facility: CLINIC | Age: 37
End: 2022-12-06
Payer: MEDICAID

## 2022-12-06 VITALS
DIASTOLIC BLOOD PRESSURE: 82 MMHG | HEART RATE: 82 BPM | OXYGEN SATURATION: 96 % | TEMPERATURE: 98 F | HEIGHT: 59 IN | BODY MASS INDEX: 34.88 KG/M2 | WEIGHT: 173 LBS | SYSTOLIC BLOOD PRESSURE: 115 MMHG | RESPIRATION RATE: 16 BRPM

## 2022-12-06 DIAGNOSIS — R05.9 COUGH, UNSPECIFIED TYPE: ICD-10-CM

## 2022-12-06 DIAGNOSIS — M06.09 RHEUMATOID ARTHRITIS OF MULTIPLE SITES WITH NEGATIVE RHEUMATOID FACTOR (HCC): Primary | ICD-10-CM

## 2022-12-06 DIAGNOSIS — Z23 NEED FOR PNEUMOCOCCAL VACCINATION: ICD-10-CM

## 2022-12-06 DIAGNOSIS — Z30.09 FAMILY PLANNING INITIATION: ICD-10-CM

## 2022-12-06 DIAGNOSIS — J45.909 UNCOMPLICATED ASTHMA, UNSPECIFIED ASTHMA SEVERITY, UNSPECIFIED WHETHER PERSISTENT: ICD-10-CM

## 2022-12-06 PROCEDURE — 99214 OFFICE O/P EST MOD 30 MIN: CPT | Performed by: INTERNAL MEDICINE

## 2022-12-06 PROCEDURE — 3074F SYST BP LT 130 MM HG: CPT | Performed by: INTERNAL MEDICINE

## 2022-12-06 PROCEDURE — 3079F DIAST BP 80-89 MM HG: CPT | Performed by: INTERNAL MEDICINE

## 2022-12-06 PROCEDURE — 3008F BODY MASS INDEX DOCD: CPT | Performed by: INTERNAL MEDICINE

## 2022-12-06 RX ORDER — FOLIC ACID 1 MG/1
3 TABLET ORAL DAILY
Qty: 270 TABLET | Refills: 3 | Status: SHIPPED | OUTPATIENT
Start: 2022-12-06

## 2022-12-06 RX ORDER — METHYLPREDNISOLONE 4 MG/1
4 TABLET ORAL AS DIRECTED
COMMUNITY
Start: 2022-12-01

## 2022-12-06 RX ORDER — BENZONATATE 100 MG/1
100 CAPSULE ORAL 3 TIMES DAILY PRN
Qty: 45 CAPSULE | Refills: 0 | Status: SHIPPED | OUTPATIENT
Start: 2022-12-06

## 2022-12-06 RX ORDER — DROSPIRENONE AND ETHINYL ESTRADIOL 0.02-3(28)
1 KIT ORAL DAILY
Qty: 28 TABLET | Refills: 11 | Status: SHIPPED | OUTPATIENT
Start: 2022-12-06 | End: 2023-12-01

## 2022-12-06 RX ORDER — METHOTREXATE 2.5 MG/1
TABLET ORAL
Qty: 50 TABLET | Refills: 0 | Status: SHIPPED | OUTPATIENT
Start: 2022-12-06 | End: 2023-02-08

## 2022-12-06 NOTE — PATIENT INSTRUCTIONS
-get egg free flu shot  -covid shot (touch base with other doctors)  -get prevnar 20  -Take tessalon pearls for cough.     ==============================================================================================================  -Start methotrexate 10 mg (4 tablets) weekly for 2 weeks, then increase to 15 mg (6 tablets) weekly. Repeat monitoring blood work 4 to 6 weeks after starting methotrexate. This blood work includes two orders: a comprehensive metabolic panel (CMP) and a complete blood count with differential (CBC w/ diff) . These orders are in the system. -Take folic acid 3 mg daily.         ==============================================================================================================      Some tips and information when starting on methotrexate:     -Make sure to only take the pills once a week. Do not take the pills every day, which can increase risk of side effects.   -When you first start taking methotrexate, try taking it on a day you are not too busy, in the event you develop any side effects.   -Some find taking methotrexate at night helps them \"sleep off\" any nausea that sometimes can occur.   -If you find you have fatigue the day or two after taking methotrexate, try drinking an extra cup (or two) of coffee on those days. Methotrexate can briefly act as an \"anti-caffeine\"; this effect is usually only on the day that you take methotrexate.   -Make sure to take folic acid every day to prevent side effects. -If you have nausea with methotrexate, you can split up the weekly dose as long it is within a 24-30 hour window. So if you were taking 4 pills of methotrexate every Saturday evening, you could split the dose to 2 pills Saturday morning and 2 pills Saturday.  Alternatively, you could also take 2 pills Saturday evening and 2 pills Sunday evening.     -Skip 2 weeks of methotrexate after the flu vaccine and 1 week of methotrexate after the COVID or pneumonia vaccine; this may make the vaccines work more effectively. -If you take an antibiotic for an infection, make sure to stop methotrexate until you are off the antibiotic.  -If you have an upcoming surgery, let me know. For most orthopedic surgeries such as knee/hip replacements, methotrexate may be continued. For other surgeries, we will decide on a case-by-case basis.   -Don't drink more than 2-3 alcoholic beverages per week while on methotrexate.   -If planning on becoming pregnant, make sure to let me know as it can cause birth defects. Methotrexate needs to be stopped 3 months prior to attempting conception. Methotrexate does NOT impact future fertility.   -Low dose methotrexate is the most studied and commonly used rheumatoid arthritis medication. It is very safe if used appropriately. Most studies have not shown a significant increased risk of infection with the medication at low doses for arthritis. It is safe to take methotrexate during the COVID pandemic. It has NOT been shown that taking methotrexate increases risk of daniel COVID or having a higher risk of severe complications from [place]. Therefore, from a practical standpoint, low dose methotrexate would not be considered an immunosuppressive as it does not increase risk  of infection for most people taking it. However, there may be a mild increase in infections if you have many other medical issues in combination such as heart disease, diabetes, etc.      Here is a nice article talking about common misconceptions and \"myths\" about methotrexate that is worth a read.    https://creakyjoints.org/treatment/methotrexate-myths/

## 2023-03-06 ENCOUNTER — LAB ENCOUNTER (OUTPATIENT)
Dept: LAB | Facility: HOSPITAL | Age: 38
End: 2023-03-06
Attending: INTERNAL MEDICINE
Payer: MEDICAID

## 2023-03-06 DIAGNOSIS — R05.9 COUGH, UNSPECIFIED TYPE: ICD-10-CM

## 2023-03-06 DIAGNOSIS — Z30.09 FAMILY PLANNING INITIATION: ICD-10-CM

## 2023-03-06 DIAGNOSIS — M06.09 RHEUMATOID ARTHRITIS OF MULTIPLE SITES WITH NEGATIVE RHEUMATOID FACTOR (HCC): ICD-10-CM

## 2023-03-06 DIAGNOSIS — Z23 NEED FOR PNEUMOCOCCAL VACCINATION: ICD-10-CM

## 2023-03-06 DIAGNOSIS — J45.909 UNCOMPLICATED ASTHMA, UNSPECIFIED ASTHMA SEVERITY, UNSPECIFIED WHETHER PERSISTENT: ICD-10-CM

## 2023-03-06 LAB
ALBUMIN SERPL-MCNC: 3.3 G/DL (ref 3.4–5)
ALBUMIN/GLOB SERPL: 0.8 {RATIO} (ref 1–2)
ALP LIVER SERPL-CCNC: 121 U/L
ALT SERPL-CCNC: 139 U/L
ANION GAP SERPL CALC-SCNC: 8 MMOL/L (ref 0–18)
AST SERPL-CCNC: 71 U/L (ref 15–37)
BASOPHILS # BLD AUTO: 0.08 X10(3) UL (ref 0–0.2)
BASOPHILS NFR BLD AUTO: 0.7 %
BILIRUB SERPL-MCNC: 0.2 MG/DL (ref 0.1–2)
BUN BLD-MCNC: 10 MG/DL (ref 7–18)
CALCIUM BLD-MCNC: 8.7 MG/DL (ref 8.5–10.1)
CHLORIDE SERPL-SCNC: 106 MMOL/L (ref 98–112)
CO2 SERPL-SCNC: 22 MMOL/L (ref 21–32)
CREAT BLD-MCNC: 0.64 MG/DL
EOSINOPHIL # BLD AUTO: 0.2 X10(3) UL (ref 0–0.7)
EOSINOPHIL NFR BLD AUTO: 1.7 %
ERYTHROCYTE [DISTWIDTH] IN BLOOD BY AUTOMATED COUNT: 13.2 %
FASTING STATUS PATIENT QL REPORTED: NO
GFR SERPLBLD BASED ON 1.73 SQ M-ARVRAT: 117 ML/MIN/1.73M2 (ref 60–?)
GLOBULIN PLAS-MCNC: 4.4 G/DL (ref 2.8–4.4)
GLUCOSE BLD-MCNC: 264 MG/DL (ref 70–99)
HCT VFR BLD AUTO: 42 %
HGB BLD-MCNC: 14.1 G/DL
IMM GRANULOCYTES # BLD AUTO: 0.07 X10(3) UL (ref 0–1)
IMM GRANULOCYTES NFR BLD: 0.6 %
LYMPHOCYTES # BLD AUTO: 3.66 X10(3) UL (ref 1–4)
LYMPHOCYTES NFR BLD AUTO: 31.4 %
MCH RBC QN AUTO: 28 PG (ref 26–34)
MCHC RBC AUTO-ENTMCNC: 33.6 G/DL (ref 31–37)
MCV RBC AUTO: 83.5 FL
MONOCYTES # BLD AUTO: 0.84 X10(3) UL (ref 0.1–1)
MONOCYTES NFR BLD AUTO: 7.2 %
NEUTROPHILS # BLD AUTO: 6.81 X10 (3) UL (ref 1.5–7.7)
NEUTROPHILS # BLD AUTO: 6.81 X10(3) UL (ref 1.5–7.7)
NEUTROPHILS NFR BLD AUTO: 58.4 %
OSMOLALITY SERPL CALC.SUM OF ELEC: 290 MOSM/KG (ref 275–295)
PLATELET # BLD AUTO: 356 10(3)UL (ref 150–450)
POTASSIUM SERPL-SCNC: 3.9 MMOL/L (ref 3.5–5.1)
PROT SERPL-MCNC: 7.7 G/DL (ref 6.4–8.2)
RBC # BLD AUTO: 5.03 X10(6)UL
SODIUM SERPL-SCNC: 136 MMOL/L (ref 136–145)
WBC # BLD AUTO: 11.7 X10(3) UL (ref 4–11)

## 2023-03-06 PROCEDURE — 80053 COMPREHEN METABOLIC PANEL: CPT

## 2023-03-06 PROCEDURE — 36415 COLL VENOUS BLD VENIPUNCTURE: CPT

## 2023-03-06 PROCEDURE — 85025 COMPLETE CBC W/AUTO DIFF WBC: CPT

## 2023-03-13 ENCOUNTER — OFFICE VISIT (OUTPATIENT)
Dept: RHEUMATOLOGY | Facility: CLINIC | Age: 38
End: 2023-03-13
Payer: MEDICAID

## 2023-03-13 VITALS
BODY MASS INDEX: 35.51 KG/M2 | SYSTOLIC BLOOD PRESSURE: 118 MMHG | OXYGEN SATURATION: 98 % | WEIGHT: 176.13 LBS | RESPIRATION RATE: 16 BRPM | DIASTOLIC BLOOD PRESSURE: 68 MMHG | HEIGHT: 59 IN | TEMPERATURE: 98 F | HEART RATE: 85 BPM

## 2023-03-13 DIAGNOSIS — Z23 NEED FOR PNEUMOCOCCAL VACCINATION: ICD-10-CM

## 2023-03-13 DIAGNOSIS — M06.09 RHEUMATOID ARTHRITIS OF MULTIPLE SITES WITH NEGATIVE RHEUMATOID FACTOR (HCC): Primary | ICD-10-CM

## 2023-03-13 DIAGNOSIS — J45.909 UNCOMPLICATED ASTHMA, UNSPECIFIED ASTHMA SEVERITY, UNSPECIFIED WHETHER PERSISTENT: ICD-10-CM

## 2023-03-13 DIAGNOSIS — K76.0 NAFLD (NONALCOHOLIC FATTY LIVER DISEASE): ICD-10-CM

## 2023-03-13 DIAGNOSIS — R74.01 ELEVATED ALT MEASUREMENT: ICD-10-CM

## 2023-03-13 PROCEDURE — 3008F BODY MASS INDEX DOCD: CPT | Performed by: INTERNAL MEDICINE

## 2023-03-13 PROCEDURE — 3074F SYST BP LT 130 MM HG: CPT | Performed by: INTERNAL MEDICINE

## 2023-03-13 PROCEDURE — 3078F DIAST BP <80 MM HG: CPT | Performed by: INTERNAL MEDICINE

## 2023-03-13 PROCEDURE — 99215 OFFICE O/P EST HI 40 MIN: CPT | Performed by: INTERNAL MEDICINE

## 2023-03-13 RX ORDER — CETIRIZINE HYDROCHLORIDE 10 MG/1
10 CAPSULE, LIQUID FILLED ORAL DAILY
COMMUNITY

## 2023-03-13 RX ORDER — FLUTICASONE PROPIONATE 50 MCG
2 SPRAY, SUSPENSION (ML) NASAL DAILY PRN
COMMUNITY
Start: 2021-06-08

## 2023-03-13 RX ORDER — BLOOD-GLUCOSE TRANSMITTER
1 EACH MISCELLANEOUS
COMMUNITY
Start: 2023-01-30

## 2023-03-13 RX ORDER — BLOOD-GLUCOSE SENSOR
EACH MISCELLANEOUS
COMMUNITY
Start: 2023-03-05

## 2023-03-13 RX ORDER — FOLIC ACID 1 MG/1
4 TABLET ORAL DAILY
Qty: 360 TABLET | Refills: 0 | Status: SHIPPED | OUTPATIENT
Start: 2023-03-13 | End: 2023-06-11

## 2023-03-13 RX ORDER — SUMATRIPTAN 50 MG/1
TABLET, FILM COATED ORAL
COMMUNITY
Start: 2023-02-02

## 2023-03-13 RX ORDER — BLOOD-GLUCOSE METER
1 EACH MISCELLANEOUS AS DIRECTED
COMMUNITY
Start: 2022-12-01

## 2023-03-13 NOTE — PATIENT INSTRUCTIONS
-Decrease methotrexate from 6 pills to 4 pills every week. Change to nighttime dosing (e.g. 2 pills Monday evening and 2 pills Tuesday evening)  -continue folic acid 3 mg daily  -start Humira 40 mg every 2 weeks.     -repeat blood work in 1 month

## 2023-05-23 ENCOUNTER — TELEPHONE (OUTPATIENT)
Dept: ENDOCRINOLOGY CLINIC | Facility: CLINIC | Age: 38
End: 2023-05-23

## 2023-05-23 ENCOUNTER — OFFICE VISIT (OUTPATIENT)
Dept: ENDOCRINOLOGY CLINIC | Facility: CLINIC | Age: 38
End: 2023-05-23
Payer: MEDICAID

## 2023-05-23 VITALS
RESPIRATION RATE: 20 BRPM | DIASTOLIC BLOOD PRESSURE: 80 MMHG | HEART RATE: 71 BPM | HEIGHT: 59 IN | BODY MASS INDEX: 35.88 KG/M2 | SYSTOLIC BLOOD PRESSURE: 128 MMHG | OXYGEN SATURATION: 98 % | WEIGHT: 178 LBS

## 2023-05-23 DIAGNOSIS — Z79.4 TYPE 2 DIABETES MELLITUS WITH HYPERGLYCEMIA, WITH LONG-TERM CURRENT USE OF INSULIN (HCC): Primary | ICD-10-CM

## 2023-05-23 DIAGNOSIS — E11.65 TYPE 2 DIABETES MELLITUS WITH HYPERGLYCEMIA, WITH LONG-TERM CURRENT USE OF INSULIN (HCC): Primary | ICD-10-CM

## 2023-05-23 LAB
CARTRIDGE LOT#: ABNORMAL NUMERIC
CREAT UR-SCNC: 142 MG/DL
HEMOGLOBIN A1C: 7.4 % (ref 4.3–5.6)
MICROALBUMIN UR-MCNC: 2.41 MG/DL
MICROALBUMIN/CREAT 24H UR-RTO: 17 UG/MG (ref ?–30)

## 2023-05-23 PROCEDURE — 99215 OFFICE O/P EST HI 40 MIN: CPT | Performed by: NURSE PRACTITIONER

## 2023-05-23 PROCEDURE — 95251 CONT GLUC MNTR ANALYSIS I&R: CPT | Performed by: NURSE PRACTITIONER

## 2023-05-23 PROCEDURE — 82043 UR ALBUMIN QUANTITATIVE: CPT | Performed by: NURSE PRACTITIONER

## 2023-05-23 PROCEDURE — 3079F DIAST BP 80-89 MM HG: CPT | Performed by: NURSE PRACTITIONER

## 2023-05-23 PROCEDURE — 82570 ASSAY OF URINE CREATININE: CPT | Performed by: NURSE PRACTITIONER

## 2023-05-23 PROCEDURE — 3008F BODY MASS INDEX DOCD: CPT | Performed by: NURSE PRACTITIONER

## 2023-05-23 PROCEDURE — 3074F SYST BP LT 130 MM HG: CPT | Performed by: NURSE PRACTITIONER

## 2023-05-23 PROCEDURE — 83036 HEMOGLOBIN GLYCOSYLATED A1C: CPT | Performed by: NURSE PRACTITIONER

## 2023-05-23 RX ORDER — GLUCAGON INJECTION, SOLUTION 1 MG/.2ML
1 INJECTION, SOLUTION SUBCUTANEOUS ONCE AS NEEDED
Qty: 0.4 ML | Refills: 0 | Status: SHIPPED | OUTPATIENT
Start: 2023-05-23 | End: 2023-05-23

## 2023-05-23 NOTE — TELEPHONE ENCOUNTER
Per Ty, send orders for pump to [employer]. Faxed AOB with face sheet, insurance cards and 1 chart note since patient is new to our office. Fax confirmed to [facility] @ [phone].

## 2023-05-31 ENCOUNTER — TELEPHONE (OUTPATIENT)
Dept: ENDOCRINOLOGY CLINIC | Facility: CLINIC | Age: 38
End: 2023-05-31

## 2023-06-01 ENCOUNTER — DIABETIC EDUCATION (OUTPATIENT)
Dept: ENDOCRINOLOGY CLINIC | Facility: CLINIC | Age: 38
End: 2023-06-01
Payer: MEDICAID

## 2023-06-01 VITALS — WEIGHT: 180 LBS | BODY MASS INDEX: 36 KG/M2

## 2023-06-01 DIAGNOSIS — E11.65 TYPE 2 DIABETES MELLITUS WITH HYPERGLYCEMIA, WITH LONG-TERM CURRENT USE OF INSULIN (HCC): ICD-10-CM

## 2023-06-01 DIAGNOSIS — Z79.4 TYPE 2 DIABETES MELLITUS WITH HYPERGLYCEMIA, WITH LONG-TERM CURRENT USE OF INSULIN (HCC): ICD-10-CM

## 2023-06-01 PROCEDURE — 99211 OFF/OP EST MAY X REQ PHY/QHP: CPT

## 2023-06-01 RX ORDER — BLOOD-GLUCOSE METER
EACH MISCELLANEOUS
Qty: 1 KIT | Refills: 0 | COMMUNITY
Start: 2023-06-01

## 2023-06-01 NOTE — PROGRESS NOTES
Morena Danny  : 1985 was seen for Initial Individual Pump Start Education:  Date: 2023  Referring Provider: [employer]    Start time: 9:30am  End time: 10:30 am    Visit summary: Pt came in today to prepare for a pump start up. We reviewed carb counting in preparation for pump usage. [employer] provided the pt with a sample T-slim pump, downloaded the simulator and T Connect neeru, as well as set up her [employer] account. Her account is currently connected with the [facility]. Pump order has been submitted. Pt was recently switched to U500 insulin; 90u 3x daily (9am, 3pm, 9pm). Her BG remains high, discussed with Romelia Castro. APN suggested insulin increase to 95u 3x daily (9am, 3pm, 9pm). Pt notified and plans to start increase tomorrow morning. Insulin Pump Brand: TSlim with Dexcom  Reviewed CGMS download and patient logs:    Days of sensor use: 14  Average glucose (mg/dL): 213  Standard deviation =/- 65 (mg/dL)    Target Ranges:  mg/dL      -   Time above range 29% Very high, 37% high  -   Time in range 34%  -   Time below range 0%    Pattern of hyperglycemia      Pump Review:    Concept of insulin pump therapy   Reviewed utilization of insulin to carbohydrate ratio and correction factor   Reviewed difference between basal and bolus insulin  Discussed filling reservoir / inserting infusion set/POD with samples    Nutrition Intervention  Comprehensive Nutrition Education Provided:  Reviewed carbohydrate counting and label reading. Recommended carbohydrate targets of 45-60 grams at meals and 15-20 grams at snacks. Dicussed how to use and the benefits of food tracking logs/applications. Reviewed the MyPlate method with focus on balanced macronutrient consumption; identifying foods that are carbohydrates, lean protein, non-starchy vegetables, and heart healthy fats. Patient Goals/Recommendations: Patient chosen goals included in patient's instructions for today.     Patient Chosen Goals:   1. Practice carb counting with apps and carb counting guidelines provided   2. Increase insulin to 95u 3x daily (9am, 3pm, 9pm)   3. Keep glucose tabs (4 glucose tabs for sugar less than 70) with you, in case of a low blood sugar   4. Practice with Tslim simulator   5. Follow up to schedule pump start appointment once supplies have been received       Hypoglycemia: Blood sugar goals: less than 130 mg/dl in the morning and less than 180 mg/dl 2 hours after meals. Keep glucose tabs or fast acting carbohydrates with you for treatment of lows; for blood glucose levels less than 70 mg/dl, take 15 grams of fast acting carbohydrates (3-4 glucose tabs, 4 ounces of juice, or as discussed). Retest in 15 min. If not above 70 mg/dl, retreat. Plan:   Pending supply shipment  Follow up appointment set for 6/16/2023 AMANUEL Castellanos. Patient verbalized understanding and has no further questions at this time.        Jacklyn Valero, RDN, LDN, [facility]  Certified Diabetes Care and   Registered Dietitian

## 2023-06-02 NOTE — PATIENT INSTRUCTIONS
Goals: 1. Practice carb counting with apps and carb counting guidelines provided   2. Increase insulin to 95u 3x daily (9am, 3pm, 9pm)   3. Keep glucose tabs (4 glucose tabs for sugar less than 70) with you, in case of a low blood sugar   4. Practice with Tslim simulator   5. Follow up to schedule pump start appointment once supplies have been received       Hypoglycemia: Keep glucose tabs or fast acting carbohydrates with you for treatment of lows; for blood glucose levels less than 70 mg/dl, take 15 grams of fast acting carbohydrates (3-4 glucose tabs, 4 ounces of juice, or as discussed). Retest in 15 min. If not above 70 mg/dl, retreat. Plan:   Pending pump supply shipment  Follow up appointment set for 6/16/2023 AMANUEL Gaary. Patient verbalized understanding and has no further questions at this time.        Ashlie Valentine, EDILBERTON, LDN, [facility]  Certified Diabetes Care and   Registered Dietitian

## 2023-06-02 NOTE — TELEPHONE ENCOUNTER
Orders faxed and confirmed to ZEB SHARMA [facility] @ [phone]. Sent to scan.

## 2023-06-05 NOTE — TELEPHONE ENCOUNTER
Srinivasan Zavala requested 30 day dexcom report due to only having 1 chart note. Faxed to [phone], confirmed.

## 2023-06-11 ENCOUNTER — LAB ENCOUNTER (OUTPATIENT)
Dept: LAB | Facility: HOSPITAL | Age: 38
End: 2023-06-11
Attending: NURSE PRACTITIONER
Payer: MEDICAID

## 2023-06-11 DIAGNOSIS — M06.09 RHEUMATOID ARTHRITIS OF MULTIPLE SITES WITH NEGATIVE RHEUMATOID FACTOR (HCC): ICD-10-CM

## 2023-06-11 DIAGNOSIS — R74.01 ELEVATED ALT MEASUREMENT: ICD-10-CM

## 2023-06-11 DIAGNOSIS — E11.65 TYPE 2 DIABETES MELLITUS WITH HYPERGLYCEMIA, WITH LONG-TERM CURRENT USE OF INSULIN (HCC): ICD-10-CM

## 2023-06-11 DIAGNOSIS — Z79.4 TYPE 2 DIABETES MELLITUS WITH HYPERGLYCEMIA, WITH LONG-TERM CURRENT USE OF INSULIN (HCC): ICD-10-CM

## 2023-06-11 DIAGNOSIS — K76.0 NAFLD (NONALCOHOLIC FATTY LIVER DISEASE): ICD-10-CM

## 2023-06-11 LAB
ALBUMIN SERPL-MCNC: 3.4 G/DL (ref 3.4–5)
ALBUMIN/GLOB SERPL: 0.8 {RATIO} (ref 1–2)
ALP LIVER SERPL-CCNC: 75 U/L
ALT SERPL-CCNC: 143 U/L
ANION GAP SERPL CALC-SCNC: 6 MMOL/L (ref 0–18)
AST SERPL-CCNC: 90 U/L (ref 15–37)
BASOPHILS # BLD AUTO: 0.1 X10(3) UL (ref 0–0.2)
BASOPHILS NFR BLD AUTO: 1 %
BILIRUB SERPL-MCNC: 0.4 MG/DL (ref 0.1–2)
BUN BLD-MCNC: 9 MG/DL (ref 7–18)
CALCIUM BLD-MCNC: 8.8 MG/DL (ref 8.5–10.1)
CHLORIDE SERPL-SCNC: 106 MMOL/L (ref 98–112)
CHOLEST SERPL-MCNC: 226 MG/DL (ref ?–200)
CO2 SERPL-SCNC: 26 MMOL/L (ref 21–32)
CREAT BLD-MCNC: 0.59 MG/DL
CRP SERPL-MCNC: 1.08 MG/DL (ref ?–0.3)
EOSINOPHIL # BLD AUTO: 0.22 X10(3) UL (ref 0–0.7)
EOSINOPHIL NFR BLD AUTO: 2.3 %
ERYTHROCYTE [DISTWIDTH] IN BLOOD BY AUTOMATED COUNT: 13.7 %
ERYTHROCYTE [SEDIMENTATION RATE] IN BLOOD: 39 MM/HR
FASTING PATIENT LIPID ANSWER: YES
FASTING STATUS PATIENT QL REPORTED: YES
GFR SERPLBLD BASED ON 1.73 SQ M-ARVRAT: 119 ML/MIN/1.73M2 (ref 60–?)
GLOBULIN PLAS-MCNC: 4.2 G/DL (ref 2.8–4.4)
GLUCOSE BLD-MCNC: 134 MG/DL (ref 70–99)
HCT VFR BLD AUTO: 43.7 %
HDLC SERPL-MCNC: 41 MG/DL (ref 40–59)
HGB BLD-MCNC: 14.3 G/DL
IMM GRANULOCYTES # BLD AUTO: 0.05 X10(3) UL (ref 0–1)
IMM GRANULOCYTES NFR BLD: 0.5 %
LDLC SERPL CALC-MCNC: 152 MG/DL (ref ?–100)
LYMPHOCYTES # BLD AUTO: 3.18 X10(3) UL (ref 1–4)
LYMPHOCYTES NFR BLD AUTO: 32.9 %
MCH RBC QN AUTO: 27.9 PG (ref 26–34)
MCHC RBC AUTO-ENTMCNC: 32.7 G/DL (ref 31–37)
MCV RBC AUTO: 85.4 FL
MONOCYTES # BLD AUTO: 0.54 X10(3) UL (ref 0.1–1)
MONOCYTES NFR BLD AUTO: 5.6 %
NEUTROPHILS # BLD AUTO: 5.57 X10 (3) UL (ref 1.5–7.7)
NEUTROPHILS # BLD AUTO: 5.57 X10(3) UL (ref 1.5–7.7)
NEUTROPHILS NFR BLD AUTO: 57.7 %
NONHDLC SERPL-MCNC: 185 MG/DL (ref ?–130)
OSMOLALITY SERPL CALC.SUM OF ELEC: 287 MOSM/KG (ref 275–295)
PLATELET # BLD AUTO: 349 10(3)UL (ref 150–450)
POTASSIUM SERPL-SCNC: 3.7 MMOL/L (ref 3.5–5.1)
PROT SERPL-MCNC: 7.6 G/DL (ref 6.4–8.2)
RBC # BLD AUTO: 5.12 X10(6)UL
SODIUM SERPL-SCNC: 138 MMOL/L (ref 136–145)
TRIGL SERPL-MCNC: 182 MG/DL (ref 30–149)
VLDLC SERPL CALC-MCNC: 35 MG/DL (ref 0–30)
WBC # BLD AUTO: 9.7 X10(3) UL (ref 4–11)

## 2023-06-11 PROCEDURE — 85025 COMPLETE CBC W/AUTO DIFF WBC: CPT

## 2023-06-11 PROCEDURE — 85652 RBC SED RATE AUTOMATED: CPT

## 2023-06-11 PROCEDURE — 86140 C-REACTIVE PROTEIN: CPT

## 2023-06-11 PROCEDURE — 80053 COMPREHEN METABOLIC PANEL: CPT

## 2023-06-11 PROCEDURE — 80061 LIPID PANEL: CPT

## 2023-06-11 PROCEDURE — 36415 COLL VENOUS BLD VENIPUNCTURE: CPT

## 2023-06-12 ENCOUNTER — TELEPHONE (OUTPATIENT)
Dept: ENDOCRINOLOGY CLINIC | Facility: CLINIC | Age: 38
End: 2023-06-12

## 2023-06-12 NOTE — TELEPHONE ENCOUNTER
Pump orders sent to provider to be completed for upcoming appt. Monday 6/26/23 at 10am was offered to pt in NP office. [facility] RD, CDE will observe training if that date works for pt.

## 2023-06-12 NOTE — TELEPHONE ENCOUNTER
Pt has an appt with you on 6/16/23. She is asking to be trained after your appt with her. Would you like to keep appt for 6/16 or r/s after pump start?

## 2023-06-12 NOTE — TELEPHONE ENCOUNTER
Cheli VITAL will be back in office tomorrow 6/13 to address question.     Narendra VITAL, BC-ADM, [facility]

## 2023-06-16 ENCOUNTER — NURSE ONLY (OUTPATIENT)
Dept: ENDOCRINOLOGY CLINIC | Facility: CLINIC | Age: 38
End: 2023-06-16
Payer: MEDICAID

## 2023-06-16 ENCOUNTER — PATIENT MESSAGE (OUTPATIENT)
Dept: ENDOCRINOLOGY CLINIC | Facility: CLINIC | Age: 38
End: 2023-06-16

## 2023-06-16 ENCOUNTER — TELEPHONE (OUTPATIENT)
Dept: RHEUMATOLOGY | Facility: CLINIC | Age: 38
End: 2023-06-16

## 2023-06-16 ENCOUNTER — OFFICE VISIT (OUTPATIENT)
Dept: RHEUMATOLOGY | Facility: CLINIC | Age: 38
End: 2023-06-16
Payer: MEDICAID

## 2023-06-16 VITALS
OXYGEN SATURATION: 98 % | SYSTOLIC BLOOD PRESSURE: 118 MMHG | WEIGHT: 179 LBS | DIASTOLIC BLOOD PRESSURE: 74 MMHG | HEART RATE: 103 BPM | BODY MASS INDEX: 36 KG/M2 | TEMPERATURE: 98 F

## 2023-06-16 DIAGNOSIS — R74.01 ELEVATED ALT MEASUREMENT: ICD-10-CM

## 2023-06-16 DIAGNOSIS — G62.9 NEUROPATHY: ICD-10-CM

## 2023-06-16 DIAGNOSIS — M06.09 RHEUMATOID ARTHRITIS OF MULTIPLE SITES WITH NEGATIVE RHEUMATOID FACTOR (HCC): Primary | ICD-10-CM

## 2023-06-16 DIAGNOSIS — G56.03 BILATERAL CARPAL TUNNEL SYNDROME: ICD-10-CM

## 2023-06-16 PROCEDURE — 3078F DIAST BP <80 MM HG: CPT | Performed by: INTERNAL MEDICINE

## 2023-06-16 PROCEDURE — 99214 OFFICE O/P EST MOD 30 MIN: CPT | Performed by: INTERNAL MEDICINE

## 2023-06-16 PROCEDURE — 3074F SYST BP LT 130 MM HG: CPT | Performed by: INTERNAL MEDICINE

## 2023-06-16 RX ORDER — INSULIN GLARGINE 100 [IU]/ML
80 INJECTION, SOLUTION SUBCUTANEOUS 2 TIMES DAILY
COMMUNITY
Start: 2023-04-12

## 2023-06-16 RX ORDER — INSULIN LISPRO 100 [IU]/ML
INJECTION, SOLUTION INTRAVENOUS; SUBCUTANEOUS
COMMUNITY
Start: 2023-04-21 | End: 2023-06-16

## 2023-06-16 RX ORDER — PEN NEEDLE, DIABETIC 31 GX5/16"
NEEDLE, DISPOSABLE MISCELLANEOUS
COMMUNITY
Start: 2023-05-19

## 2023-06-16 RX ORDER — INSULIN LISPRO 100 [IU]/ML
150 INJECTION, SOLUTION INTRAVENOUS; SUBCUTANEOUS DAILY
Qty: 45 ML | Refills: 5 | Status: SHIPPED | OUTPATIENT
Start: 2023-06-16

## 2023-06-16 RX ORDER — LIRAGLUTIDE 6 MG/ML
INJECTION SUBCUTANEOUS
COMMUNITY
Start: 2023-04-21

## 2023-06-16 RX ORDER — LIRAGLUTIDE 6 MG/ML
INJECTION SUBCUTANEOUS
COMMUNITY
Start: 2023-03-13

## 2023-06-16 RX ORDER — ADALIMUMAB 40MG/0.4ML
KIT SUBCUTANEOUS
COMMUNITY
Start: 2023-04-10

## 2023-06-16 RX ORDER — GLUCAGON INJECTION, SOLUTION 1 MG/.2ML
INJECTION, SOLUTION SUBCUTANEOUS
COMMUNITY
Start: 2023-05-24

## 2023-06-16 NOTE — TELEPHONE ENCOUNTER
U-100 sent - please explain to pt we will start with u100 in pump as the trainers are not allowed to teach u500 which is off label for the pump. If unable to get control will be converting to u500 in pump and will adjust settings. Pt to f/u with me in 1 month for review. Since she cannot see me sooner can we also get her scheduled with CDE in 2 weeks?

## 2023-06-16 NOTE — TELEPHONE ENCOUNTER
I called [facility] pharmacy to see if patient had refills on file to set up a shipment. [facility] confirmed that they do and pt can call to schedule a shipment of her humira. Called pt to inform her. Sending phone number to [facility] through a Videobot.

## 2023-06-16 NOTE — TELEPHONE ENCOUNTER
Left message trying to reach patient. I don't know how much insulin she is using. Please update script thank you.

## 2023-06-16 NOTE — TELEPHONE ENCOUNTER
From: Carmen Martínez  To: AMANUEL [place]  Sent: 6/16/2023 10:44 AM CDT  Subject: U-100    Hello     I actually dont have a prescription for the U-100 insulin

## 2023-06-16 NOTE — PATIENT INSTRUCTIONS
Restart Humira 40 mg every 2 weeks    Recheck liver tests in early/mid July; if blood work is good, then will start cymbalta for diabetic neuropathy    Schedule nerve study of the bilateral arms     For carpal tunnel syndrome, prescribe wrist splints to be worn nightly.

## 2023-06-20 NOTE — TELEPHONE ENCOUNTER
Future Appointments   Date Time Provider Vibha Jazmyne   6/25/2023 12:00 PM [facility] LAB Betty Sanjeev   6/27/2023  8:30 AM Irina Prather RD [facility] [address]   7/18/2023  7:30 AM AMANUEL Chirinos [facility] [address]   10/6/2023  9:00 AM Becky Anguiano MD [facility] Shadia Arellano

## 2023-06-28 ENCOUNTER — TELEPHONE (OUTPATIENT)
Dept: ENDOCRINOLOGY CLINIC | Facility: CLINIC | Age: 38
End: 2023-06-28

## 2023-06-28 RX ORDER — INSULIN LISPRO 200 [IU]/ML
INJECTION, SOLUTION SUBCUTANEOUS
Qty: 30 ML | Refills: 5 | Status: SHIPPED | OUTPATIENT
Start: 2023-06-28

## 2023-07-05 ENCOUNTER — PATIENT MESSAGE (OUTPATIENT)
Dept: ENDOCRINOLOGY CLINIC | Facility: CLINIC | Age: 38
End: 2023-07-05

## 2023-07-05 ENCOUNTER — NURSE ONLY (OUTPATIENT)
Dept: ENDOCRINOLOGY CLINIC | Facility: CLINIC | Age: 38
End: 2023-07-05
Payer: MEDICAID

## 2023-07-05 NOTE — TELEPHONE ENCOUNTER
From: Julia Sanchez  To: Wilton Frankel, APRN  Sent: 7/5/2023 10:06 AM CDT  Subject: Insulin pump supplies     Hello,    Im on my last infusion set and supplies should arrive sometime tomorrow afternoon im due to change it in the morning not sure what to do in the meantime

## 2023-07-18 ENCOUNTER — OFFICE VISIT (OUTPATIENT)
Dept: ENDOCRINOLOGY CLINIC | Facility: CLINIC | Age: 38
End: 2023-07-18
Payer: MEDICAID

## 2023-07-18 VITALS
HEART RATE: 80 BPM | BODY MASS INDEX: 36.89 KG/M2 | OXYGEN SATURATION: 100 % | HEIGHT: 59 IN | RESPIRATION RATE: 18 BRPM | DIASTOLIC BLOOD PRESSURE: 64 MMHG | WEIGHT: 183 LBS | SYSTOLIC BLOOD PRESSURE: 114 MMHG

## 2023-07-18 DIAGNOSIS — Z79.4 TYPE 2 DIABETES MELLITUS WITH HYPERGLYCEMIA, WITH LONG-TERM CURRENT USE OF INSULIN (HCC): Primary | ICD-10-CM

## 2023-07-18 DIAGNOSIS — E11.65 TYPE 2 DIABETES MELLITUS WITH HYPERGLYCEMIA, WITH LONG-TERM CURRENT USE OF INSULIN (HCC): Primary | ICD-10-CM

## 2023-07-18 PROCEDURE — 99214 OFFICE O/P EST MOD 30 MIN: CPT | Performed by: NURSE PRACTITIONER

## 2023-07-18 PROCEDURE — 3074F SYST BP LT 130 MM HG: CPT | Performed by: NURSE PRACTITIONER

## 2023-07-18 PROCEDURE — 3078F DIAST BP <80 MM HG: CPT | Performed by: NURSE PRACTITIONER

## 2023-07-18 PROCEDURE — 3008F BODY MASS INDEX DOCD: CPT | Performed by: NURSE PRACTITIONER

## 2023-07-18 PROCEDURE — 95251 CONT GLUC MNTR ANALYSIS I&R: CPT | Performed by: NURSE PRACTITIONER

## 2023-07-18 RX ORDER — INSULIN LISPRO 200 [IU]/ML
INJECTION, SOLUTION SUBCUTANEOUS
Qty: 42 ML | Refills: 5 | Status: SHIPPED | OUTPATIENT
Start: 2023-07-18

## 2023-07-19 ENCOUNTER — PATIENT MESSAGE (OUTPATIENT)
Dept: ENDOCRINOLOGY CLINIC | Facility: CLINIC | Age: 38
End: 2023-07-19

## 2023-07-19 NOTE — TELEPHONE ENCOUNTER
Rx was sent for humalog u-200 last month and again yesterday - can we please call pharmacy and check on status, is there a PA or denial?

## 2023-07-19 NOTE — TELEPHONE ENCOUNTER
Spoke to Angel at pharmacy. She said they are filling the original Rx, they just didn't have the full qty in stock so they ordered it. She said they told the pt they were getting it for her. They have it ready now and said they have contacted the pt. FYI to provider.

## 2023-08-09 ENCOUNTER — TELEMEDICINE (OUTPATIENT)
Dept: ENDOCRINOLOGY CLINIC | Facility: CLINIC | Age: 38
End: 2023-08-09
Payer: MEDICAID

## 2023-08-09 DIAGNOSIS — Z79.4 TYPE 2 DIABETES MELLITUS WITH HYPERGLYCEMIA, WITH LONG-TERM CURRENT USE OF INSULIN (HCC): Primary | ICD-10-CM

## 2023-08-09 DIAGNOSIS — E11.65 TYPE 2 DIABETES MELLITUS WITH HYPERGLYCEMIA, WITH LONG-TERM CURRENT USE OF INSULIN (HCC): Primary | ICD-10-CM

## 2023-08-09 PROCEDURE — 99214 OFFICE O/P EST MOD 30 MIN: CPT | Performed by: NURSE PRACTITIONER

## 2023-08-09 PROCEDURE — 95251 CONT GLUC MNTR ANALYSIS I&R: CPT | Performed by: NURSE PRACTITIONER

## 2023-08-14 DIAGNOSIS — E11.65 TYPE 2 DIABETES MELLITUS WITH HYPERGLYCEMIA, WITH LONG-TERM CURRENT USE OF INSULIN (HCC): ICD-10-CM

## 2023-08-14 DIAGNOSIS — Z79.4 TYPE 2 DIABETES MELLITUS WITH HYPERGLYCEMIA, WITH LONG-TERM CURRENT USE OF INSULIN (HCC): ICD-10-CM

## 2023-08-14 RX ORDER — INSULIN LISPRO 200 [IU]/ML
INJECTION, SOLUTION SUBCUTANEOUS
Qty: 42 ML | Refills: 0 | Status: SHIPPED | OUTPATIENT
Start: 2023-08-14

## 2023-08-14 NOTE — TELEPHONE ENCOUNTER
Received fax from [address] [facility] for refill of humalog U200. Pended med  Requested Prescriptions     Pending Prescriptions Disp Refills    Insulin Lispro (HUMALOG KWIKPEN) 200 UNIT/ML Subcutaneous Solution Pen-injector 42 mL 5     Sig: Use as directed up to TDD: 250 units     Your appointments       Date & Time Appointment Department [facility])    Aug 23, 2023  2:45 PM CDT [facility] [address] [place] [address])    Please verify your telehealth insurance benefits prior to your appointment. You must be in the state of [place] during the virtual visit. Please use the [employer] Mayra and launch the video visit 10 minutes prior to your scheduled appointment time to ensure your camera and microphone are working properly. Once the video visit has started you will be placed in a waiting room until the provider begins the visit. You will receive an email confirmation with instructions. If you have questions, call your doctor's office directly. If you are having issues or need to use a desktop/laptop, please follow the below steps:        1. Close out all other open apps (could be competing for audio resources)  2. Disable Bluetooth  3.       Reboot mobile device before joining the video  4. Come off Wi-Fi and switch over to Data    Please see our Video Visit Tip Sheet if you need additional assistance. If you believe this is an emergency, please dial 911 immediately. Oct 10, 2023  2:00 PM CDT EMG <5 Muscles with Alexi Gant, [address] ([facility]    Your appointment is at [address] [place] [address] [address] [facility], [address]. Please park in the [address] [facility] [address] [facility] [address] garage directly across from  medical office building. Use the elevator in [address] Building to  get to the 4th floor and [address]. No dietary restrictions.     Please DO NOT put any lotion on the body part or extremities to be tested. Please bathe or shower on the day of the exam.  ( Oils on the skin will effect the test. )    You will need to bring your insurance card, photo ID, and your  doctor's order, if you received one from the doctor's office. Without the order your test may be delayed or postponed. If your  doctor's office submitted the order electronically, it will be  available when you check in. If you or your doctor's office faxed  the order, it will be available when you check in. Bill Kearney [address] DIABETES [address]  [phone]          Last A1c value was 7.4% done 5/23/2023.     Refill 7/18/23  LOV 8/09/23

## 2023-08-23 ENCOUNTER — TELEMEDICINE (OUTPATIENT)
Dept: ENDOCRINOLOGY CLINIC | Facility: CLINIC | Age: 38
End: 2023-08-23
Payer: MEDICAID

## 2023-08-23 DIAGNOSIS — E11.65 TYPE 2 DIABETES MELLITUS WITH HYPERGLYCEMIA, WITH LONG-TERM CURRENT USE OF INSULIN (HCC): Primary | ICD-10-CM

## 2023-08-23 DIAGNOSIS — Z79.4 TYPE 2 DIABETES MELLITUS WITH HYPERGLYCEMIA, WITH LONG-TERM CURRENT USE OF INSULIN (HCC): Primary | ICD-10-CM

## 2023-08-23 PROCEDURE — 99214 OFFICE O/P EST MOD 30 MIN: CPT | Performed by: NURSE PRACTITIONER

## 2023-08-23 PROCEDURE — 95251 CONT GLUC MNTR ANALYSIS I&R: CPT | Performed by: NURSE PRACTITIONER

## 2023-09-02 ENCOUNTER — MOBILE ENCOUNTER (OUTPATIENT)
Dept: ENDOCRINOLOGY CLINIC | Facility: CLINIC | Age: 38
End: 2023-09-02

## 2023-09-02 DIAGNOSIS — Z79.4 TYPE 2 DIABETES MELLITUS WITH HYPERGLYCEMIA, WITH LONG-TERM CURRENT USE OF INSULIN (HCC): Primary | ICD-10-CM

## 2023-09-02 DIAGNOSIS — E11.65 TYPE 2 DIABETES MELLITUS WITH HYPERGLYCEMIA, WITH LONG-TERM CURRENT USE OF INSULIN (HCC): Primary | ICD-10-CM

## 2023-09-02 NOTE — PROGRESS NOTES
Patient paged on-call service this a.m. stating she has 1 day of Humalog U200 left. Patient states she cannot fill prescription per insurance and needs a prior authorization. Discussed with patient we cannot provide prior authorizations on the weekend. Call placed to patient's [address] who states her insurance limit is 72 mL in 68 days and patient is currently going through 72 mL in 58 days. The insurance has authorized a one-time override to supply her with 12 days of insulin. She will require a quality limit exception be completed by calling [phone]. Notified pharmacist we will work on this next week when we are back in office. Pharmacy to notify patient she can  her insulin.

## 2023-09-05 ENCOUNTER — TELEPHONE (OUTPATIENT)
Dept: ENDOCRINOLOGY CLINIC | Facility: CLINIC | Age: 38
End: 2023-09-05

## 2023-09-05 NOTE — PROGRESS NOTES
Call with pharmacy, they dispensed one box on Saturday. Needs quantity limit override. Contacted [employer], they are faxing the form to us, specific quantity limit PA.

## 2023-09-05 NOTE — TELEPHONE ENCOUNTER
Contacted pharmacy, confirmed Quantity limit override was needed. Contacted [employer] for correct form. Faxed with chart note to [employer] @ [phone], confirmed.

## 2023-09-06 NOTE — TELEPHONE ENCOUNTER
Humalog U200 quantity limit PA was approved through 06/07/2023 through 9/05/2024. 39 ml per 30 days (that ends up being a split box, which [facility] can't do, but they can fill 36 ml today). Notified pharmacy and patient, approval sent to scan.

## 2023-09-12 ENCOUNTER — TELEPHONE (OUTPATIENT)
Dept: ENDOCRINOLOGY CLINIC | Facility: CLINIC | Age: 38
End: 2023-09-12

## 2023-09-12 DIAGNOSIS — F32.A ANXIETY AND DEPRESSION: ICD-10-CM

## 2023-09-12 DIAGNOSIS — Z79.4 TYPE 2 DIABETES MELLITUS WITH HYPERGLYCEMIA, WITH LONG-TERM CURRENT USE OF INSULIN (HCC): Primary | ICD-10-CM

## 2023-09-12 DIAGNOSIS — F41.9 ANXIETY AND DEPRESSION: ICD-10-CM

## 2023-09-12 DIAGNOSIS — E11.65 TYPE 2 DIABETES MELLITUS WITH HYPERGLYCEMIA, WITH LONG-TERM CURRENT USE OF INSULIN (HCC): Primary | ICD-10-CM

## 2023-09-13 ENCOUNTER — LAB ENCOUNTER (OUTPATIENT)
Dept: LAB | Facility: HOSPITAL | Age: 38
End: 2023-09-13
Attending: INTERNAL MEDICINE
Payer: MEDICAID

## 2023-09-13 ENCOUNTER — OFFICE VISIT (OUTPATIENT)
Dept: RHEUMATOLOGY | Facility: CLINIC | Age: 38
End: 2023-09-13
Payer: MEDICAID

## 2023-09-13 VITALS
RESPIRATION RATE: 16 BRPM | SYSTOLIC BLOOD PRESSURE: 118 MMHG | OXYGEN SATURATION: 95 % | HEIGHT: 59 IN | BODY MASS INDEX: 37.86 KG/M2 | WEIGHT: 187.81 LBS | DIASTOLIC BLOOD PRESSURE: 76 MMHG | HEART RATE: 84 BPM | TEMPERATURE: 98 F

## 2023-09-13 DIAGNOSIS — M06.09 RHEUMATOID ARTHRITIS OF MULTIPLE SITES WITH NEGATIVE RHEUMATOID FACTOR (HCC): Primary | ICD-10-CM

## 2023-09-13 DIAGNOSIS — G62.9 NEUROPATHY: ICD-10-CM

## 2023-09-13 DIAGNOSIS — G56.03 BILATERAL CARPAL TUNNEL SYNDROME: ICD-10-CM

## 2023-09-13 DIAGNOSIS — R74.01 ELEVATED ALT MEASUREMENT: ICD-10-CM

## 2023-09-13 DIAGNOSIS — E11.65 TYPE 2 DIABETES MELLITUS WITH HYPERGLYCEMIA, WITH LONG-TERM CURRENT USE OF INSULIN (HCC): ICD-10-CM

## 2023-09-13 DIAGNOSIS — K76.0 NAFLD (NONALCOHOLIC FATTY LIVER DISEASE): ICD-10-CM

## 2023-09-13 DIAGNOSIS — M79.7 FIBROMYALGIA: ICD-10-CM

## 2023-09-13 DIAGNOSIS — M19.90 INFLAMMATORY ARTHRITIS: Primary | ICD-10-CM

## 2023-09-13 DIAGNOSIS — E11.49 OTHER DIABETIC NEUROLOGICAL COMPLICATION ASSOCIATED WITH TYPE 2 DIABETES MELLITUS (HCC): ICD-10-CM

## 2023-09-13 DIAGNOSIS — Z79.4 TYPE 2 DIABETES MELLITUS WITH HYPERGLYCEMIA, WITH LONG-TERM CURRENT USE OF INSULIN (HCC): ICD-10-CM

## 2023-09-13 DIAGNOSIS — M47.819 SPONDYLOARTHRITIS: ICD-10-CM

## 2023-09-13 PROBLEM — K51.90 ULCERATIVE COLITIS (HCC): Status: ACTIVE | Noted: 2020-03-20

## 2023-09-13 PROBLEM — F41.9 ANXIETY: Status: ACTIVE | Noted: 2022-02-08

## 2023-09-13 PROBLEM — J45.909 ASTHMA (HCC): Status: ACTIVE | Noted: 2023-09-13

## 2023-09-13 PROBLEM — J45.909 ASTHMA: Status: ACTIVE | Noted: 2023-09-13

## 2023-09-13 LAB
ALBUMIN SERPL-MCNC: 3.3 G/DL (ref 3.4–5)
ALP LIVER SERPL-CCNC: 110 U/L
ALT SERPL-CCNC: 114 U/L
AST SERPL-CCNC: 53 U/L (ref 15–37)
BILIRUB DIRECT SERPL-MCNC: <0.1 MG/DL (ref 0–0.2)
BILIRUB SERPL-MCNC: 0.2 MG/DL (ref 0.1–2)
CORTIS SERPL-MCNC: 7.8 UG/DL
FOLATE SERPL-MCNC: 5.6 NG/ML (ref 8.7–?)
PROT SERPL-MCNC: 7.5 G/DL (ref 6.4–8.2)
VIT B12 SERPL-MCNC: 438 PG/ML (ref 193–986)

## 2023-09-13 PROCEDURE — 36415 COLL VENOUS BLD VENIPUNCTURE: CPT

## 2023-09-13 PROCEDURE — 82533 TOTAL CORTISOL: CPT

## 2023-09-13 PROCEDURE — 99214 OFFICE O/P EST MOD 30 MIN: CPT | Performed by: INTERNAL MEDICINE

## 2023-09-13 PROCEDURE — 3078F DIAST BP <80 MM HG: CPT | Performed by: INTERNAL MEDICINE

## 2023-09-13 PROCEDURE — 82607 VITAMIN B-12: CPT

## 2023-09-13 PROCEDURE — 80076 HEPATIC FUNCTION PANEL: CPT

## 2023-09-13 PROCEDURE — 3008F BODY MASS INDEX DOCD: CPT | Performed by: INTERNAL MEDICINE

## 2023-09-13 PROCEDURE — 3074F SYST BP LT 130 MM HG: CPT | Performed by: INTERNAL MEDICINE

## 2023-09-13 PROCEDURE — 82746 ASSAY OF FOLIC ACID SERUM: CPT

## 2023-09-13 RX ORDER — HYDROXYCHLOROQUINE SULFATE 200 MG/1
200 TABLET, FILM COATED ORAL 2 TIMES DAILY
Qty: 180 TABLET | Refills: 1 | Status: SHIPPED | OUTPATIENT
Start: 2023-09-13

## 2023-09-13 RX ORDER — CEPHALEXIN 500 MG/1
CAPSULE ORAL
COMMUNITY
Start: 2023-08-03 | End: 2023-09-13

## 2023-09-13 RX ORDER — AMITRIPTYLINE HYDROCHLORIDE 10 MG/1
TABLET, FILM COATED ORAL
Qty: 228 TABLET | Refills: 1 | Status: SHIPPED | OUTPATIENT
Start: 2023-09-13 | End: 2023-12-12

## 2023-09-13 RX ORDER — CIPROFLOXACIN AND DEXAMETHASONE 3; 1 MG/ML; MG/ML
SUSPENSION/ DROPS AURICULAR (OTIC)
COMMUNITY
Start: 2023-08-03 | End: 2023-09-13

## 2023-09-13 RX ORDER — FLUTICASONE PROPIONATE 50 MCG
1 SPRAY, SUSPENSION (ML) NASAL 2 TIMES DAILY
COMMUNITY
Start: 2023-08-19 | End: 2023-11-17

## 2023-09-13 RX ORDER — METRONIDAZOLE 7.5 MG/G
GEL VAGINAL NIGHTLY
COMMUNITY
Start: 2023-08-19 | End: 2023-09-13

## 2023-09-14 ENCOUNTER — TELEPHONE (OUTPATIENT)
Dept: RHEUMATOLOGY | Facility: CLINIC | Age: 38
End: 2023-09-14

## 2023-09-14 DIAGNOSIS — E53.8 LOW FOLIC ACID: Primary | ICD-10-CM

## 2023-09-14 RX ORDER — FOLIC ACID 1 MG/1
1 TABLET ORAL DAILY
Qty: 90 TABLET | Refills: 3 | Status: SHIPPED | OUTPATIENT
Start: 2023-09-14

## 2023-09-27 ENCOUNTER — TELEMEDICINE (OUTPATIENT)
Dept: ENDOCRINOLOGY CLINIC | Facility: CLINIC | Age: 38
End: 2023-09-27
Payer: MEDICAID

## 2023-09-27 ENCOUNTER — NURSE ONLY (OUTPATIENT)
Dept: ENDOCRINOLOGY CLINIC | Facility: CLINIC | Age: 38
End: 2023-09-27
Payer: MEDICAID

## 2023-09-27 DIAGNOSIS — E11.65 TYPE 2 DIABETES MELLITUS WITH HYPERGLYCEMIA, WITH LONG-TERM CURRENT USE OF INSULIN (HCC): Primary | ICD-10-CM

## 2023-09-27 DIAGNOSIS — Z79.4 TYPE 2 DIABETES MELLITUS WITH HYPERGLYCEMIA, WITH LONG-TERM CURRENT USE OF INSULIN (HCC): Primary | ICD-10-CM

## 2023-09-27 PROCEDURE — 99214 OFFICE O/P EST MOD 30 MIN: CPT | Performed by: NURSE PRACTITIONER

## 2023-09-27 RX ORDER — PROCHLORPERAZINE 25 MG/1
1 SUPPOSITORY RECTAL
Qty: 1 EACH | Refills: 3 | Status: SHIPPED | OUTPATIENT
Start: 2023-09-27

## 2023-09-27 RX ORDER — INSULIN ASPART INJECTION 100 [IU]/ML
INJECTION, SOLUTION SUBCUTANEOUS
Qty: 30 ML | Refills: 0 | COMMUNITY
Start: 2023-09-27

## 2023-09-27 RX ORDER — PROCHLORPERAZINE 25 MG/1
SUPPOSITORY RECTAL
Qty: 3 EACH | Refills: 11 | Status: SHIPPED | OUTPATIENT
Start: 2023-09-27

## 2023-09-28 ENCOUNTER — TELEPHONE (OUTPATIENT)
Dept: ENDOCRINOLOGY CLINIC | Facility: CLINIC | Age: 38
End: 2023-09-28

## 2023-09-28 NOTE — TELEPHONE ENCOUNTER
LMTCB to schedule a follow up with CDE next week to check on patient since changes to pump and insulin strength.

## 2023-09-28 NOTE — TELEPHONE ENCOUNTER
Pt changed to U-500 in insulin pump. Since I will be out of office next week is there anyway a CDE can do a visit and check in on patient and do a visit with her?  If you have questions once you see her CGM you can discuss with Ayush Barbosa or Rahel Murillo

## 2023-10-02 ENCOUNTER — PATIENT MESSAGE (OUTPATIENT)
Dept: ENDOCRINOLOGY CLINIC | Facility: CLINIC | Age: 38
End: 2023-10-02

## 2023-10-02 NOTE — TELEPHONE ENCOUNTER
From: Albina Lo  To: Ella Robertson  Sent: 10/2/2023 2:29 PM CDT  Subject: U-500    Hello,    I just got a total of 6 pens of insulin which I believe will not be enough for the whole month I only have 4 left I was wondering if you could do the same thing as last time with the override for the months supplie of insulin. Thank you!

## 2023-10-02 NOTE — TELEPHONE ENCOUNTER
Patient is most likely overfilling reservoir, patient should only be filling to 1ml marking based on total daily dose on pump report. Please schedule patient with educator to review as KR had previously recommended.     Torrie VITAL, BC-ADM, [facility]

## 2023-10-02 NOTE — TELEPHONE ENCOUNTER
Called patient to discuss, attempted to schedule with educator but patient can only make appointments at 7:30 or 8 am, there is nothing available for several weeks. Her job is not flexible. Pt is seeing Leo Esparza next week Wednesday for virtual visit. Pt states her numbers are running high after meals. Pt states she is filling it to same level as the previous insulin concentration. Informed her she should only need to fill to 1 ml per provider. Adjustment needed prior  to next weeks appt with Leo Esparza?

## 2023-10-02 NOTE — TELEPHONE ENCOUNTER
Katheryn BOCANEGRAN reviewed patients pump and CGM report earlier today per KR request and does not feel any urgent issues to address at this time. Patient to keep virtual visit with KR next week as scheduled.     Allan Larios APRN, BC-ADM, [facility]

## 2023-10-02 NOTE — TELEPHONE ENCOUNTER
Contacted pharmacy. Script was written for 15 ml, pharmacy filled 3 boxes @ 18 ml since they can't split boxes. At 240 units daily, 18 ml would be a 37 day supply. Confirming with provider since this is U500 in a pump.

## 2023-10-10 ENCOUNTER — TELEPHONE (OUTPATIENT)
Dept: RHEUMATOLOGY | Facility: CLINIC | Age: 38
End: 2023-10-10

## 2023-10-10 ENCOUNTER — OFFICE VISIT (OUTPATIENT)
Dept: ELECTROPHYSIOLOGY | Facility: HOSPITAL | Age: 38
End: 2023-10-10
Attending: INTERNAL MEDICINE
Payer: MEDICAID

## 2023-10-10 DIAGNOSIS — G56.03 BILATERAL CARPAL TUNNEL SYNDROME: Primary | ICD-10-CM

## 2023-10-10 DIAGNOSIS — G56.03 BILATERAL CARPAL TUNNEL SYNDROME: ICD-10-CM

## 2023-10-10 DIAGNOSIS — G62.9 NEUROPATHY: ICD-10-CM

## 2023-10-10 PROCEDURE — 95911 NRV CNDJ TEST 9-10 STUDIES: CPT | Performed by: OTHER

## 2023-10-10 PROCEDURE — 95886 MUSC TEST DONE W/N TEST COMP: CPT | Performed by: OTHER

## 2023-10-10 NOTE — PROCEDURES
[facility]  Nerve Conduction & EMG Report                      Yoni Ethan, [address]   EMG department   [address] [facility] [address], [address]  [phone]          Patient name: Varun Beverly  YOB: 1985  Referring provider: Dr. Mary Brooks  Reason for study: Evaluate for mononeuropathy  Brief history: 41year old female with 2-year history of numbness and tingling in the fingertips, as well as more recent intermittent sharp pain that radiates from the thumb to the forearm. Sensory and motor nerve conduction studies, and needle EMG:  Please see separate sheet for waveforms and quantitative nerve conduction data, as well as for tabulated form of electromyographic data. Summary:   1. The bilateral median sensory responses had prolonged distal latencies. Amplitudes were normal.  2.  The bilateral ulnar and right radial sensory responses were normal.  3.  The right median motor response had prolonged distal latency, but was otherwise normal.  The left median motor response had slowing of conduction velocity, but was otherwise normal.  4.  The bilateral ulnar motor responses were normal.  5.  Needle EMG using a concentric needle was performed on selected muscles of the right upper extremity. All muscles tested were normal.    Conclusion:  1. 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  They are both mild to moderate in severity. 2.  There is no evidence of a cervical radiculopathy or plexopathy.       Yoni Long DO  Neurology and Neuromuscular medicine  [facility]

## 2023-10-11 ENCOUNTER — TELEPHONE (OUTPATIENT)
Dept: ENDOCRINOLOGY CLINIC | Facility: CLINIC | Age: 38
End: 2023-10-11

## 2023-10-11 ENCOUNTER — TELEMEDICINE (OUTPATIENT)
Dept: ENDOCRINOLOGY CLINIC | Facility: CLINIC | Age: 38
End: 2023-10-11
Payer: MEDICAID

## 2023-10-11 DIAGNOSIS — E11.65 TYPE 2 DIABETES MELLITUS WITH HYPERGLYCEMIA, WITH LONG-TERM CURRENT USE OF INSULIN (HCC): Primary | ICD-10-CM

## 2023-10-11 DIAGNOSIS — Z79.4 TYPE 2 DIABETES MELLITUS WITH HYPERGLYCEMIA, WITH LONG-TERM CURRENT USE OF INSULIN (HCC): Primary | ICD-10-CM

## 2023-10-11 PROBLEM — G56.03 BILATERAL CARPAL TUNNEL SYNDROME: Status: ACTIVE | Noted: 2023-10-11

## 2023-10-11 PROCEDURE — 99214 OFFICE O/P EST MOD 30 MIN: CPT | Performed by: NURSE PRACTITIONER

## 2023-10-11 NOTE — TELEPHONE ENCOUNTER
Received fax from eladio from visit on 3/04/23 - no retinopathy. Abstracted into epic. Put on KR desk to sign before sent to scan.

## 2023-10-11 NOTE — TELEPHONE ENCOUNTER
Spoke with patient . Lizet Navaing has refferal in for patient to see endo. Gave the patient the phone number to endo office. Informed pat she could keep apt with her.

## 2023-10-12 ENCOUNTER — TELEPHONE (OUTPATIENT)
Dept: ORTHOPEDICS CLINIC | Facility: CLINIC | Age: 38
End: 2023-10-12

## 2023-10-12 DIAGNOSIS — M25.531 RIGHT WRIST PAIN: Primary | ICD-10-CM

## 2023-10-12 DIAGNOSIS — M25.532 LEFT WRIST PAIN: ICD-10-CM

## 2023-10-12 NOTE — TELEPHONE ENCOUNTER
[facility] Patient has maxi wrist pain. No imaging in epic, please advise for imaging.   Future Appointments   Date Time Provider Vibha Jazmyne   11/1/2023  2:15 PM AMANUEL Lewis EMGDIABCTRNA [facility] [address]   12/7/2023  8:15 AM Brian Jeff MD [facility] ORTHO 75 [facility]   1/2/2024  8:00 AM Lan Hill MD EMGRHEUMHBSN EMG [address] [place]   3/18/2024  1:40 PM Tiff De La Cruz MD [facility] EM[facility] [place]   3/18/2024  3:00 PM AMANUEL Vázquez EMGWEI EMG [facility]

## 2023-10-16 ENCOUNTER — TELEPHONE (OUTPATIENT)
Dept: ENDOCRINOLOGY CLINIC | Facility: CLINIC | Age: 38
End: 2023-10-16

## 2023-10-16 NOTE — TELEPHONE ENCOUNTER
Pt left message requesting call back. Returned call. Per patient, she has been unable to fill her humalog U500. Confirmed amount with [facility] is correct. Call with pharmacy. Her insurance will only allow her to fill about two weeks at a time due to large quantity. They will order and should have in stock tomorrow. Pt notified.

## 2023-10-31 ENCOUNTER — TELEPHONE (OUTPATIENT)
Dept: ENDOCRINOLOGY CLINIC | Facility: CLINIC | Age: 38
End: 2023-10-31

## 2023-10-31 NOTE — TELEPHONE ENCOUNTER
Received fax for PA on Humulin patient last refill through [facility] was 10/17/23 for a 17 day supply too early for refill

## 2023-11-01 ENCOUNTER — PATIENT MESSAGE (OUTPATIENT)
Dept: ENDOCRINOLOGY CLINIC | Facility: CLINIC | Age: 38
End: 2023-11-01

## 2023-11-01 NOTE — TELEPHONE ENCOUNTER
Pt wrote in today stating she can't fill u500, needs PA. Contacted [facility], was transferred. This needs a quantity limit override. Will complete. Quantity limit and chart note faxed to [facility] @ [phone] with last chart note. Confirmed.

## 2023-11-01 NOTE — TELEPHONE ENCOUNTER
From: Osbaldo Kasper  To: Thomas Gao  Sent: 11/1/2023 12:41 PM CDT  Subject: U-500    Hi     Im down to 1 pen and the insurance is not covering it the pharmacy told me I need to get an override.

## 2023-11-02 NOTE — TELEPHONE ENCOUNTER
Received fax that [address] has been approved effective 8/04/23 through 11/02/2024. Sent approval to scan.  Sent [facility] to update

## 2023-11-21 ENCOUNTER — PATIENT MESSAGE (OUTPATIENT)
Dept: ENDOCRINOLOGY CLINIC | Facility: CLINIC | Age: 38
End: 2023-11-21

## 2023-11-21 DIAGNOSIS — E11.65 TYPE 2 DIABETES MELLITUS WITH HYPERGLYCEMIA, WITH LONG-TERM CURRENT USE OF INSULIN (HCC): Primary | ICD-10-CM

## 2023-11-21 DIAGNOSIS — Z79.4 TYPE 2 DIABETES MELLITUS WITH HYPERGLYCEMIA, WITH LONG-TERM CURRENT USE OF INSULIN (HCC): Primary | ICD-10-CM

## 2023-11-21 PROCEDURE — 95251 CONT GLUC MNTR ANALYSIS I&R: CPT | Performed by: NURSE PRACTITIONER

## 2023-11-21 NOTE — TELEPHONE ENCOUNTER
Call with patient, provided pump setting changes and those changes were made successfully while on the phone. Pt requested pump back up plan via [employer] for reference. Pt will update us in a week and inform us of any continued lows.

## 2023-11-21 NOTE — TELEPHONE ENCOUNTER
Reviewed pump report. Glucose starts to fall around midnight consistently recently. Pt is also having low glucose again around 6-7 am before breakfast. Please have pt make the following changes:    Humalog U-500 via T-slim with control IQ     Basal rates:      12MN: 0.80 --> lower to 0.7 units/hour if glucose continues to drop reduce to 0.65 units/hr  0700: 0.50 --> lower to 0.45 units/hour  2000: 0.70 units/hour     Bolus settings:   Max bolus: 25 units      Carbohydrate ratio:   12MN: 8     Insulin sensitivity:  12MN:  20     BG target:  12MN: 110     Active insulin: 5 hours   Insulin back up plan: Humalog u-500 Inject 30 minutes before meals - give 60 units before breakfast, and 44 units before lunch and 44 units before dinner    Pt to please call next week with update or sooner if needed for continued hypoglycemia.

## 2023-11-29 NOTE — TELEPHONE ENCOUNTER
Reviewed [employer] report.  Much improved with only 2% hypoglycemia with pattern appearing around 4 am. Please inform pt to make the following pump change - add a 2 am basal rate of 0.55 units/hour for new pump settings of:    12 am 0.7 u/hr  + 2 am 0.55 u/hr  7 am 0.45 u/hr  8 pm 0.7 u/hr

## 2023-11-30 NOTE — TELEPHONE ENCOUNTER
Called pt - confirmed she did get the [facility] from yesterday.  She made the changes yesterday but said she still had a low this morning despite the changes

## 2024-01-02 ENCOUNTER — OFFICE VISIT (OUTPATIENT)
Dept: RHEUMATOLOGY | Facility: CLINIC | Age: 39
End: 2024-01-02
Payer: MEDICAID

## 2024-01-02 VITALS
WEIGHT: 193 LBS | HEIGHT: 59 IN | BODY MASS INDEX: 38.91 KG/M2 | DIASTOLIC BLOOD PRESSURE: 70 MMHG | SYSTOLIC BLOOD PRESSURE: 124 MMHG | HEART RATE: 92 BPM | RESPIRATION RATE: 16 BRPM | OXYGEN SATURATION: 96 % | TEMPERATURE: 98 F

## 2024-01-02 DIAGNOSIS — M79.7 FIBROMYALGIA: ICD-10-CM

## 2024-01-02 DIAGNOSIS — M06.09 RHEUMATOID ARTHRITIS OF MULTIPLE SITES WITH NEGATIVE RHEUMATOID FACTOR (HCC): Primary | ICD-10-CM

## 2024-01-02 DIAGNOSIS — Z51.81 THERAPEUTIC DRUG MONITORING: ICD-10-CM

## 2024-01-02 DIAGNOSIS — R06.83 SNORING: ICD-10-CM

## 2024-01-02 DIAGNOSIS — R06.81 APNEA: ICD-10-CM

## 2024-01-02 PROCEDURE — 3008F BODY MASS INDEX DOCD: CPT | Performed by: INTERNAL MEDICINE

## 2024-01-02 PROCEDURE — 99214 OFFICE O/P EST MOD 30 MIN: CPT | Performed by: INTERNAL MEDICINE

## 2024-01-02 PROCEDURE — 3074F SYST BP LT 130 MM HG: CPT | Performed by: INTERNAL MEDICINE

## 2024-01-02 PROCEDURE — 3078F DIAST BP <80 MM HG: CPT | Performed by: INTERNAL MEDICINE

## 2024-01-02 RX ORDER — AMITRIPTYLINE HYDROCHLORIDE 25 MG/1
25 TABLET, FILM COATED ORAL NIGHTLY
Qty: 90 TABLET | Refills: 1 | Status: SHIPPED | OUTPATIENT
Start: 2024-01-02

## 2024-01-02 RX ORDER — AMITRIPTYLINE HYDROCHLORIDE 10 MG/1
10 TABLET, FILM COATED ORAL NIGHTLY
COMMUNITY
Start: 2023-12-21

## 2024-01-02 NOTE — PATIENT INSTRUCTIONS
[facility] pharmacy/medical supply: Get appropriate fitting carpal tunnel brace    -Hydroxychloroquine (plaquenil): continue 2 pills daily  -continue adalimumab 40 mg every 2 weeks  -Polysomnogram (PSG)/sleep study to evaluate sleep disordered breathing given unrefreshed sleep and fibromyalgia    -for fibromyalgia/poor sleep: increase from 10 to 25 mg tablet nightly   -continue walking, Taichi for fibromyalgia   -continue TENS unit [employer] Muscle Stimulator 24 massage mode unit. This can help for fibromyalgia and neck/back pain.   -For carpal tunnel syndrome, use wrist splints to be worn nightly.

## 2024-01-02 NOTE — PROGRESS NOTES
Rheumatology f/u Patient Note  =====================================================================================================    Chief complaint: seronegative RA    Chief Complaint   Patient presents with    Follow - Up     LOV 9/13/2023. Pt not sleeping well still and states her pain has gotten worse. She rates her pain an 8/10 today. Her pain is all over.      PCP  [facility]   Bella Howard MD  [facility] Phone: [phone] ext [phone]  [facility] Fax: [phone]    =====================================================================================================  HPI   Date of visit: 11/14/2022    Keke Dumont is a 38 year old female     Patient presents for further evaluation of polyarthralgia.  Past medical history includes diabetes complicated by diabetic neuropathy, asthma requiring frequent corticosteroid tapers.  Polyarthralgia started in early 2022. This started after Covid infection shortly prior to development of arthralgia.  -Joints that are affected; wrists, ankles, elbows.  -Prior medications: tylenol (cut back b/c of mild ALT elevation)  -Movement makes pain worse.  -started working again,  at [employer], trying not to walk to much.   -Slipped and fell on stairs in feb 2022. Prior fall on ice x 3 years at [place]   -Prior hx of UC, now rediagnosed as IBS.   -Multiple rounds of prednisone did not help.   -diabetic neuropathy in the feet. Chronic  -Diagnosis of chlamydia in September 2022.  No worsening of chronic arthritis.  Patient's arthralgia began several months prior to this infection  Medications:  Ibuprofen 800 mg BID: No benefit  Multiple rounds of prednisone 40 mg daily for asthma in the last 6 months without benef    ==============================================================================================================  Visit: 09/13/23  More pain in arms and hands.  More pain being off methotrexate and Humira.  Did not repeat LFTs until this morning so was unable to get  the okay to restart Humira.  Getting Mirena soon.   Numbness/tingling: ok  Pain in both arms noted.  Nephew killed by cartel in [place].  Mom  recently.  Lots of stress in family.  ==============================================================================================================  Today's Visit: 24    Did not get Mirena. New job.- at [employer]  -back on Humira  -on hydroxychloroquine (plaquenil)   -Hands much better  -doing cricket chi.  Widespread pain still severe.  -not sleeping much, 3-4 hours each evening. Snores a lot.   -2023.  EMG/NCS recently, noted below. Carpal tunnel syndrome is better. Intermittent use of carpal tunnel syndrome splints.  They are too long for her. Using special mouse   Conclusion:  1.  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  They are both mild to moderate in severity.  2.  There is no evidence of a cervical radiculopathy or plexopathy.    -Amitriptyline 10 mg daily: Taken at night, initial benefit noted.  Now wearing off    5 point ROS negative except noted above  I had reviewed past medical and family histories together with allergy and medication lists documented.      Medications:  Current Outpatient Medications on File Prior to Visit   Medication Sig Dispense Refill    amitriptyline 10 MG Oral Tab Take 1 tablet (10 mg total) by mouth nightly.      insulin regular human, conc, 500 UNIT/ML Subcutaneous Solution Pen-injector Uses up to 550 units daily via insulin pump 11 each 3    insulin regular human, conc, 500 UNIT/ML Subcutaneous Solution Pen-injector As directed for  units 15 mL 5    folic acid 1 MG Oral Tab Take 1 tablet (1 mg total) by mouth daily. 90 tablet 3    hydroxychloroquine 200 MG Oral Tab Take 1 tablet (200 mg total) by mouth 2 (two) times daily. 180 tablet 1    GVOKE HYPOPEN 1-PACK 1 MG/0.2ML Subcutaneous SUBQ injection INJECT 0.2 ML (1 MG TOTAL) INTO THE SKIN ONCE AS  NEEDED. SEVERE HYPOGLYCEMIA      SUMAtriptan 50 MG Oral Tab TAKE 1 TABLET BY MOUTH 1 TIME FOR UP TO 1 DOSE AS NEEDED FOR MIGRAINE      DULERA 100-5 MCG/ACT Inhalation Aerosol Inhale 2 puffs into the lungs 2 (two) times daily.      ipratropium-albuterol 0.5-2.5 (3) MG/3ML Inhalation Solution Take 3 mL by nebulization.      Continuous Blood Gluc Sensor (DEXCOM G6 SENSOR) Does not apply Misc USE AS DIRECTED AND CHANGE EVERY 10 DAYS (Patient not taking: Reported on 1/2/2024) 3 each 11    Continuous Blood Gluc Transmit (DEXCOM G6 TRANSMITTER) Does not apply Misc 1 Device every 3 (three) months. (Patient not taking: Reported on 1/2/2024) 1 each 3    Blood Glucose Monitoring Suppl (ONETOUCH VERIO IQ SYSTEM) w/Device Does not apply Kit Use as directed. (Patient not taking: Reported on 9/13/2023) 1 kit 0    Blood Glucose Monitoring Suppl (ONETOUCH VERIO FLEX SYSTEM) w/Device Does not apply Kit 1 Device As Directed. (Patient not taking: Reported on 9/13/2023)       No current facility-administered medications on file prior to visit.   ?  Allergies:  Allergies   Allergen Reactions    Milk ANAPHYLAXIS    Penicillins HIVES    Aspirin RASH and NAUSEA AND VOMITING    Codeine NAUSEA AND VOMITING         Objective    Vitals:    01/02/24 0812   BP: 124/70   Pulse: 92   Resp: 16   Temp: 98.1 °F (36.7 °C)   SpO2: 96%   Weight: 193 lb (87.5 kg)   Height: 4' 11\" (1.499 m)     GEN: NAD, well-nourished.   HEENT: Head: NCAT. Face: No lesions. Eyes: Conjunctiva clear.   PULM:  easy effort  Extremities: No cyanosis, edema or deformities.   Neurologic: Strength, CN2-12 grossly intact   Skin: No lesions or rashes.  MSK: 28 joint count performed. No evidence of synovitis in mcp, pip, dip, wrist, elbows, shoulders, hips, knees, ankles, mtp unless otherwise noted. Full ROM of elbows, wrists, knees.       PtGA: 2  SJ: 0  TJ: 1  MDGA: 2    CDAI: 5  Severe widespread allodynia/hyperesthesia/tenderness    ?  Labs:  Lab Results   Component Value Date     WBC 9.7 06/11/2023    RBC 5.12 06/11/2023    HGB 14.3 06/11/2023    HCT 43.7 06/11/2023    .0 06/11/2023    MCV 85.4 06/11/2023    MCH 27.9 06/11/2023    MCHC 32.7 06/11/2023    RDW 13.7 06/11/2023    NEPRELIM 5.57 06/11/2023    NEPERCENT 57.7 06/11/2023    LYPERCENT 32.9 06/11/2023    MOPERCENT 5.6 06/11/2023    EOPERCENT 2.3 06/11/2023    BAPERCENT 1.0 06/11/2023    NE 5.57 06/11/2023    LYMABS 3.18 06/11/2023    MOABSO 0.54 06/11/2023    EOABSO 0.22 06/11/2023    BAABSO 0.10 06/11/2023     Lab Results   Component Value Date     (H) 06/11/2023    BUN 9 06/11/2023    BUNCREA 15.6 06/09/2021    CREATSERUM 0.59 06/11/2023    ANIONGAP 6 06/11/2023    GFRNAA 120 09/20/2021    GFRAA 139 09/20/2021    CA 8.8 06/11/2023    OSMOCALC 287 06/11/2023    ALKPHO 110 (H) 09/13/2023    AST 53 (H) 09/13/2023     (H) 09/13/2023    BILT 0.2 09/13/2023    TP 7.5 09/13/2023    ALB 3.3 (L) 09/13/2023    GLOBULIN 4.2 06/11/2023     06/11/2023    K 3.7 06/11/2023     06/11/2023    CO2 26.0 06/11/2023 6/2023  Sed rate 39  CRP 1.08  CBC W differential WNL  Creatinine 2.59, AST 90,   A1c 7.4    3/2023  CBC W differential WNL  Creatinine 0.64, AST 71, ,     11/20/2022  CK 58  Vitamin B12 675  Vitamin D 13.3  TSH 2.240  Ferritin 103.1  G6PD 14.9  HLA-B27 negative  ANCA, MPO/VA-3 negative  RADHIKA is negative  Uric acid 3.9  CCP, RF negative  IGRA is negative  Sed rate 15  CRP 0.91  CBC normal.       9/2022:  Comment: Positive:  Presence of C. trachomatis (by MICHAEL)   Chlamydia trachomatis RNA detected by PCR.    HIV, hepatitis B surface antigen, RPR negative    7/2022  WBC 12.9, hemoglobin 14.3, platelets 363  Creatinine 0.76, ALT 54, rest of CMP normal    Additional Labs:    Radiology:    Radiology review:      =====================================================================================================  Assessment and Plan    Assessment:  1. Rheumatoid arthritis of multiple  sites with negative rheumatoid factor (HCC)    2. Fibromyalgia    3. Snoring    4. Apnea    5. Therapeutic drug monitoring         #seronegative RA:   -Today, CDAI is 26 ->5 after restarting adalimumab adding hydroxychloroquine since the last visit.  This indicates a significant positive response to her immunomodulators and she is in low disease activity today.    -Previously with good response to methotrexate and adalimumab.  Now off immunomodulators due to concerns for hepatotoxicity given elevated AST/ALT  -Reviewed hepatic panel from today which is stable even off immunomodulators.  Suspect elevated AST/ALT is due to NAFLD.    #Fibromyalgia:  --Active today  --Snoring and apneas noted    #Diabetes complicated by diabetic neuropathy: Primarily of the lower extremities.  -- Suspect active in the upper extremities as well  #Bilateral carpal tunnel syndrome: improving overall with bracing  --11/2023 EMG/NCS with mild/moderate carpal tunnel syndrome    The following in italics were not discussed today:  #Asthma  #FHx: mother with RA and osteoporosis.     High risk medication labs including CMP and CBC w/ diff reviewed from 6/2023. LFTs from 9/13/2023. Elevated AST/ALT persistently noted. noted.  9/2022: HIV, hep C, hepatitis B surface antigen is negative  11/2022: Hepatitis B core antibody total, IGRA negative    Plan:   [facility] pharmacy/medical supply: Get appropriate fitting carpal tunnel brace    -Hydroxychloroquine (plaquenil): continue 2 pills daily  -continue adalimumab 40 mg every 2 weeks  -Polysomnogram (PSG)/sleep study to evaluate sleep disordered breathing given unrefreshed sleep and fibromyalgia    -for fibromyalgia/poor sleep: increase from 10 to 25 mg tablet nightly   -continue walking, Taichi for fibromyalgia   -continue TENS unit [employer] Muscle Stimulator 24 massage mode unit. This can help for fibromyalgia and neck/back pain.   -For carpal tunnel syndrome, use wrist splints to be worn nightly.      Rtc 4 months    Diagnoses and all orders for this visit:    Rheumatoid arthritis of multiple sites with negative rheumatoid factor (HCC)  -     amitriptyline 25 MG Oral Tab; Take 1 tablet (25 mg total) by mouth nightly.  -     Diagnostic Sleep Study-split night PAP implemented if criteria met  -     General sleep study; Future  -     Comp Metabolic Panel (14); Future  -     CBC With Differential With Platelet; Future    Fibromyalgia  -     amitriptyline 25 MG Oral Tab; Take 1 tablet (25 mg total) by mouth nightly.  -     Diagnostic Sleep Study-split night PAP implemented if criteria met  -     General sleep study; Future  -     Comp Metabolic Panel (14); Future  -     CBC With Differential With Platelet; Future    Snoring  -     amitriptyline 25 MG Oral Tab; Take 1 tablet (25 mg total) by mouth nightly.  -     Diagnostic Sleep Study-split night PAP implemented if criteria met  -     General sleep study; Future  -     Comp Metabolic Panel (14); Future  -     CBC With Differential With Platelet; Future    Apnea  -     amitriptyline 25 MG Oral Tab; Take 1 tablet (25 mg total) by mouth nightly.  -     Diagnostic Sleep Study-split night PAP implemented if criteria met  -     General sleep study; Future  -     Comp Metabolic Panel (14); Future  -     CBC With Differential With Platelet; Future    Therapeutic drug monitoring  -     amitriptyline 25 MG Oral Tab; Take 1 tablet (25 mg total) by mouth nightly.  -     Diagnostic Sleep Study-split night PAP implemented if criteria met  -     General sleep study; Future  -     Comp Metabolic Panel (14); Future  -     CBC With Differential With Platelet; Future            Return in about 4 months (around 5/2/2024).      The above plan of care, diagnosis, orders, and follow-up were discussed with the patient. Questions related to this recommended plan of care were answered.    Thank you for referring this delightful patient to me. Please feel free to contact me with any  questions.     This report was performed utilizing speech recognition software technology. Despite proofreading, speech recognition errors could escape detection. If a word or phrase is confusing or out of context, please do not hesitate to call for   clarification.       Kind regards      Trenton Fisher MD  [facility]

## 2024-03-11 ENCOUNTER — TELEPHONE (OUTPATIENT)
Dept: RHEUMATOLOGY | Facility: CLINIC | Age: 39
End: 2024-03-11

## 2024-03-18 ENCOUNTER — OFFICE VISIT (OUTPATIENT)
Facility: CLINIC | Age: 39
End: 2024-03-18
Payer: MEDICAID

## 2024-03-18 ENCOUNTER — OFFICE VISIT (OUTPATIENT)
Dept: INTERNAL MEDICINE CLINIC | Facility: CLINIC | Age: 39
End: 2024-03-18
Payer: MEDICAID

## 2024-03-18 VITALS — OXYGEN SATURATION: 99 % | DIASTOLIC BLOOD PRESSURE: 74 MMHG | HEART RATE: 77 BPM | SYSTOLIC BLOOD PRESSURE: 126 MMHG

## 2024-03-18 VITALS
HEART RATE: 92 BPM | HEIGHT: 59 IN | RESPIRATION RATE: 16 BRPM | SYSTOLIC BLOOD PRESSURE: 120 MMHG | WEIGHT: 193 LBS | BODY MASS INDEX: 38.91 KG/M2 | DIASTOLIC BLOOD PRESSURE: 82 MMHG

## 2024-03-18 DIAGNOSIS — F41.9 ANXIETY: ICD-10-CM

## 2024-03-18 DIAGNOSIS — Z79.4 TYPE 2 DIABETES MELLITUS WITH HYPERGLYCEMIA, WITH LONG-TERM CURRENT USE OF INSULIN (HCC): Primary | ICD-10-CM

## 2024-03-18 DIAGNOSIS — M06.09 RHEUMATOID ARTHRITIS OF MULTIPLE SITES WITH NEGATIVE RHEUMATOID FACTOR (HCC): ICD-10-CM

## 2024-03-18 DIAGNOSIS — E11.65 TYPE 2 DIABETES MELLITUS WITH HYPERGLYCEMIA, WITH LONG-TERM CURRENT USE OF INSULIN (HCC): ICD-10-CM

## 2024-03-18 DIAGNOSIS — E11.65 TYPE 2 DIABETES MELLITUS WITH HYPERGLYCEMIA, WITH LONG-TERM CURRENT USE OF INSULIN (HCC): Primary | ICD-10-CM

## 2024-03-18 DIAGNOSIS — R74.8 ELEVATED LIVER ENZYMES: ICD-10-CM

## 2024-03-18 DIAGNOSIS — E78.2 ELEVATED CHOLESTEROL WITH ELEVATED TRIGLYCERIDES: ICD-10-CM

## 2024-03-18 DIAGNOSIS — Z79.4 TYPE 2 DIABETES MELLITUS WITH HYPERGLYCEMIA, WITH LONG-TERM CURRENT USE OF INSULIN (HCC): ICD-10-CM

## 2024-03-18 DIAGNOSIS — E66.9 OBESITY (BMI 30-39.9): ICD-10-CM

## 2024-03-18 DIAGNOSIS — Z51.81 THERAPEUTIC DRUG MONITORING: Primary | ICD-10-CM

## 2024-03-18 LAB
CARTRIDGE LOT#: ABNORMAL NUMERIC
HEMOGLOBIN A1C: 7.4 % (ref 4.3–5.6)

## 2024-03-18 PROCEDURE — 99214 OFFICE O/P EST MOD 30 MIN: CPT | Performed by: NURSE PRACTITIONER

## 2024-03-18 RX ORDER — TIRZEPATIDE 2.5 MG/.5ML
2.5 INJECTION, SOLUTION SUBCUTANEOUS WEEKLY
Qty: 2 ML | Refills: 0 | Status: CANCELLED | OUTPATIENT
Start: 2024-03-18

## 2024-03-18 NOTE — PROGRESS NOTES
[facility] Note    Name: Keke Dumont    Date: 3/18/2024    HISTORY OF PRESENT ILLNESS   Keke Dumont is a 38 year old female with PMHx significant for UC, RA, DM2 who presents for  DM mgmt.    Initial HPI consult in March 2024  Previously with DM center [facility] [place], LOV 9/2023  Referred to endo for inadequate glycemic control    Diabetes history:  Type: 2 (antibodies negative)  Onset: 2016  Pt has not been admitted to the hospital for blood sugars.   Pt does not have a hx of pancreatitis.      Current DM regimen:  Humalog U-200 via T-slim with control IQ    Previous DM therapies:  Victoza,Trulicity - GI upset and nausea  Metformin - significant stomach cramping  Glipizide - significant cramping  MDI     Pt's had some low's since her LOV with [facility] and her latest Tandem pump settings as adjusted  are as follows: uses U-500    Basal:  12 am 0.7 u/hr  2 am 0.45 u/hr  7 am 0.45 u/hr  8 pm 0.7 u/hr    Carbohydrate ratio:   12MN: 2     Insulin sensitivity:  12MN:  10  BG target:  12MN: 110    Her backup insulin plan is Humalog U-500 60U/44U/44U qAC TID     Last A1c value was 7.4% done 3/18/2024     DM Meds: insulin regular human (conc) Sopn - 500 UNIT/ML, 500 UNIT/ML   Previously trialed/failed:   Victoza,Trulicity - GI upset and nausea  Metformin - significant stomach cramping  Glipizide - significant cramping      Continuous Glucose Monitoring Interpretation        Interpretations:  Pt's eating pattern is very erratic, relies on  mostly for cooking, sometimes she'll sleep late into the day and otherwise she eats late at night. No BG<70, however BG does drift lower overnight.      Has fibromyalgia and RA; uses cane to ambulate longer distances     REVIEW OF SYSTEMS  Ten point review of systems has been performed and is otherwise negative and/or non-contributory, except as described above.     Medications:     Current Outpatient Medications:     amitriptyline 25 MG Oral Tab, Take 1 tablet (25  mg total) by mouth nightly., Disp: 90 tablet, Rfl: 1    insulin regular human, conc, 500 UNIT/ML Subcutaneous Solution Pen-injector, Uses up to 550 units daily via insulin pump, Disp: 11 each, Rfl: 3    insulin regular human, conc, 500 UNIT/ML Subcutaneous Solution Pen-injector, As directed for  units, Disp: 15 mL, Rfl: 5    Continuous Blood Gluc Sensor (DEXCOM G6 SENSOR) Does not apply Misc, USE AS DIRECTED AND CHANGE EVERY 10 DAYS, Disp: 3 each, Rfl: 11    Continuous Blood Gluc Transmit (DEXCOM G6 TRANSMITTER) Does not apply Misc, 1 Device every 3 (three) months., Disp: 1 each, Rfl: 3    folic acid 1 MG Oral Tab, Take 1 tablet (1 mg total) by mouth daily., Disp: 90 tablet, Rfl: 3    hydroxychloroquine 200 MG Oral Tab, Take 1 tablet (200 mg total) by mouth 2 (two) times daily., Disp: 180 tablet, Rfl: 1    GVOKE HYPOPEN 1-PACK 1 MG/0.2ML Subcutaneous SUBQ injection, INJECT 0.2 ML (1 MG TOTAL) INTO THE SKIN ONCE AS NEEDED. SEVERE HYPOGLYCEMIA, Disp: , Rfl:     Blood Glucose Monitoring Suppl (ONETOUCH VERIO IQ SYSTEM) w/Device Does not apply Kit, Use as directed., Disp: 1 kit, Rfl: 0    Blood Glucose Monitoring Suppl (ONETOUCH VERIO FLEX SYSTEM) w/Device Does not apply Kit, 1 Device As Directed., Disp: , Rfl:     SUMAtriptan 50 MG Oral Tab, TAKE 1 TABLET BY MOUTH 1 TIME FOR UP TO 1 DOSE AS NEEDED FOR MIGRAINE, Disp: , Rfl:     DULERA 100-5 MCG/ACT Inhalation Aerosol, Inhale 2 puffs into the lungs 2 (two) times daily., Disp: , Rfl:     ipratropium-albuterol 0.5-2.5 (3) MG/3ML Inhalation Solution, Take 3 mL by nebulization., Disp: , Rfl:     amitriptyline 10 MG Oral Tab, Take 1 tablet (10 mg total) by mouth nightly., Disp: , Rfl:      Allergies:   Allergies   Allergen Reactions    Milk ANAPHYLAXIS    Penicillins HIVES    Aspirin RASH and NAUSEA AND VOMITING    Codeine NAUSEA AND VOMITING       Social History:   Social History     Socioeconomic History    Marital status:    Tobacco Use    Smoking status:  Former     Packs/day: 0.50     Years: 15.00     Additional pack years: 0.00     Total pack years: 7.50     Types: Cigarettes     Quit date: 2021     Years since quittin.2    Smokeless tobacco: Never   Vaping Use    Vaping Use: Never used   Substance and Sexual Activity    Alcohol use: Not Currently    Drug use: Not Currently       Medical History:   Reviewed    Surgical history:   Past Surgical History:   Procedure Laterality Date    OTHER      torn ligament and tendon to left ankle.          PHYSICAL EXAM  Vitals:    24 1331   BP: 126/74   Pulse: 77   SpO2: 99%     CONSTITUTIONAL:  awake, alert, cooperative, no apparent distress, and appears stated age  PSYCH: normal affect  LUNGS: breathing comfortably  CARDIOVASCULAR:  regular rate       Labs/Imaging: Reviewed.      ASSESSMENT/PLAN:    (E11.65,  Z79.4) Type 2 diabetes mellitus with hyperglycemia, with long-term current use of insulin (HCC)  (primary encounter diagnosis)  Plan: POC Hgb A1C    Stable A1C of 7.4% above goal for pt's age and comorbidities. She is currently using Tandem control IQ with U-500. Challeng with making dose adjustments is that U-500 is intermediate-acting and pt has also had experiences with post-prandial low's. Once glycemic control achieved, pt would be interested in switching back to MDI.    We discussed the importance of glycemic control to prevent complications of diabetes. We discussed the importance of SBGM and consistent, low carb diet as well as moderate exercise as well.  We also discussed the complications of diabetes include retinopathy, neuropathy, nephropathy and cardiovascular disease.      - Continue current settings on Tandem; given that she's using U-500, any changes to ICR would be 5x as much. Would work on carb counting first to ensure she is correctly entering amount; afterwards if still not at goal, can consider switching to ICR 1:8--> 1:7  - previously trialed and intolerance of GLP, ORTEZ, metformin;  discussed actos for insulin sensitization, pt politely declined  - check BG 4x/day using glucometer or CGM  - Ophtho: no DR, Last Dilated Eye Exam: 03/04/23   Eye Exam shows Diabetic Retinopathy?: No  - BP controlled, no antihypertensives  - LDL above goal, of child-bearing age  - MAB negative  - Neuropathy/ Foot exam: Last Foot Exam: 05/23/23  - CAD: none   - amenable to CDE referral for carb counting education    Her backup insulin plan is Humalog U-500 60U/44U/44U before breakfast/lunch/dinner respectively if pump fails.      Return to Clinic in 3 months with DM APN    A total of 45 minutes was spent today on obtaining history, reviewing pertinent labs, evaluating patient, providing multiple treatment options, reinforcing diet/exercise and compliance, and completing documentation.      3/18/2024  Yousif Carroll MD

## 2024-03-18 NOTE — PATIENT INSTRUCTIONS
Welcome to the [facility]!!  Thank you for placing your trust in our health care team, I look forward to working with you along this journey to better health!  Next steps:     1.  Schedule a personal nutrition consultation with one of our registered dieticians. Bring along your food journal (3 days minimum). See journal options below.  2.  when you are done with your procedure- we can think about starting mounjaro injection       Please try to work on the following dietary changes this first month:  Daily protein recommendation to start:  grams  Daily carbohydrate: <150g  Daily calories: 1,500  1.  Drink water with meals and throughout the day, cut down on soda and/or juice if consumed. Consider flavored water options like Bubbly, Spindrift, Hint and Sylvain.  2.  Eat breakfast daily and focus on having protein with each meal, examples include: greek yogurt, cottage cheese, hard boiled egg, whole grain toast with peanut butter.   3.  Reduce refined carbohydrates and sugars which includes items such as sweets, as well as rice, pasta, and bread and make sure to choose whole grain options when having them with just 1 serving per meal about the size of your inner palm.  4.  Consume non starchy veggies daily working towards making them a good 50% of your daily food intake. Add them to lunch and dinner consistently.  5.  Optional can start a daily probiotic: VSL#3, (order on line at www.vsl3.com). Take 1 capsule daily with water for 30 days, then reduce to 1 every other day (this will reduce the cost). Capsules can be left out for 2 weeks, but then must be refrigerated.      Please download neeru My Fitness Pal, LoseIt! Or Net Diary to monitor daily dietary intake and you will be able to see if you are eating the right amount of calories or too much or too little which would hinder weight loss. Additionally this will help to see your daily carbohydrate and protein intake. When you set  the mayra up choose 1-2 lbs/week as a goal.  Keeping a paper food journal is an option as well to remain accountable for your choices- this is the start to mindful eating! A low calorie diet has been consistently shown to support weight loss.     Continue or start exercising to help establish a routine. If not already exercising begin with 1 day and progress as able with long-term goal of 30 minutes 5 days a week at a minimum.     Meditation daily can help manage and control stress. Chronic stress can make weight loss difficult.  Exercising is one way to help with stress, but meditation using the CALM Mayra or another comparable alternative can be done in your home or place of work with little time commitment. This Mayra can also help work on behavior change and improve sleep. Check out the segment under Calm Masterclass and listen to The 4 Pillars of Health. A great way to begin learning about the foundation of lifestyle with practical tips to use in your every day.     Check out www.yourweightmatters.org blog for continued daily support and education along this weight loss journey!    Patient Resources:     Personal Training/Fitness Classes/Health Coaching     [facility] @ [email or web address] Full fitness center with group fitness and personal training. Discount available as client of [facility].  Health Coaching and Personal Training with Liz Gracia at our [facility]- individual weekly coaching with option to add personal training and small group fitness classes targeted at weight loss- [phone] and/or email @ [email or web address]  [employer] [place] [email or web address] Fitness [phone] and/or email Riana at [email or web address]  [facility] Fitness Centers with multiple locations: [employer] (www.Br[employer] Peak [employer]), Eat The Frog [employer] (www.Empowering [employer] [place].com), Delta  iSpot.tv Fitness (www.Dude Solutions."[employer]), The Exercise  (www.exercisecoach."[employer]     Online Fitness  Fitness  on Utube  Fit in 10 DVD series- www.zwvxm54CQE.com  Sit and Be Fit - Chair exercise series Www.sitandbefit.org  Hip Hop Fit with Baldemar Chang at www.hiphopfit.net     Apps for on the Go Fitness  [place] 7 Minute Workout (orange box with white 7) - free on the go HIIT training mayra  Peloton Mayra @ www.onepeloton.com     Nutrition Trackers and Tools  LoseIT! And My Fitness Pal apps and on line for tracking nutrition  NOOM - virtual health coaching  FitFoundation (healthy meals on the go) in [place] @ wwwWoowUprqbimwbpckpmp8lPrecision Biopsy  Favio [place] @ Stemline Therapeuticsbistromd.com and Dopefn41 (keto and low carb plans recommended) @ www.ihzgnp55.com, Metabolic Meals @ www.MasalaMeals."[employer] - individual prepared meals to go  Gobble, Blue Apron, Home , Every Plate, Sunbasket- on line meal delivery programs for preparation at home  [place] in [place] for homemade meals to go @ wwwWoowUpmealZoomio Holding  Diet Doctor @ www.dietdoctor.com - low carb swaps  YuRock Control - meal prep and planning mayra (www.yummly.com)     Stress Management/Behavior/Mindful Eating  CALM meditation mayra (www.calm.com)  Headspace  Am I Hungry? Mindful eating virtual  mayra  Www.yourweightmatters.org - Obesity Action Coalition sponsored Blog posts daily  Motivation mayra (black box with white \")- daily supportive messages sent to your phone     Books/Video Education/Podcasts  Mindless Eating by Skip Cano  Why We Get Sick by James Marsh (a book about insulin resistance)  Atomic Habits by Norris Irizarry (a book about taking small steps to promote greater behavior change)   Can't Hurt Me by Jared Helton (a book exploring the power of discipline in achieving your goals)  The End of Dieting: How to Live for Life by Dr. Colt Cline M.D. or listen to The The Online 401 Podcast Episode 63: Understanding \"Nutritarian\" Eating w/Dr. Colt Cline  Your Body in  Balance: The New Science of Food, Hormones, and Health by Dr. Justen Talley  The Menopause Diet Plan by Antionette Key and Es Ferrari  The Complete Guide to fasting by Dr. Rees  Sugar, Salt & Fat by Sobia Umana, Ph.D, R.D.  Weight Loss Surgery Will Not Treat Food Addiction by Christy Mckee Ph.D  The Game Changers- Digital Accademiaix Documentary on plant based nutrition  Fed Up - documentary about obesity (Free on Utube)  The Truth About Sugar - documentary on sugar (Free on Utube, https://youIOCSu.be/2Z5vkvyFP2u)  The Dr. Acosta T5 Wellness Plan by Dr. Han Acosta MD  Fitlosophy Fitspiration - journal to better health (found at [employer] in fitness aisle)  What Happened to You?- a look at the impact trauma has on behavior written by Tonia Eller and Dr. Jose Beach  Whole Again by Guanako Phan - discovering your true self after trauma  Brittney Sims talk on Flapshare, The Call to Courage  Podcasts: The Exam Room by the Physician's Committee, Nutrition Facts by Dr. Silva    We are here to support you with weight loss, but please remember that you still need your primary care provider for your routine health maintenance.

## 2024-03-18 NOTE — PROGRESS NOTES
HISTORY OF PRESENT ILLNESS  Chief Complaint   Patient presents with    Weight Problem     Recommendation from Yvonne Middleton APRN, never try meds and open for meds     Keke Dumont is a 38 year old female new to our office today for initiation of medical weight loss program.  Patient presents today with c/o excess weight. Referred by, Yvonne Middleton- DM APRN     Has been struggling weight gain for the past couple of years  Admits that she started gaining weight after starting insulin- about 2 yrs ago  Hx of type 2 diabetes- last A1c 7.4% on 3/2024- insulin pump and managed by endocrine specialist   Fasting blood sugars , after eating 250-400, has CGM  No exercise, due to multiple joint pain  Eating outside the home 4-5 times per week  Can't stand long periods to make food,  will do most of cooking and will eat late (9pm)     Denies chest pain, shortness of breath, dizziness, blurred vision, headache, paresthesia, nausea/vomiting.     Reason/goal for weight loss: Lose some weight in 6 month and To be back down hopefully to my 150 weight in 1 year   Triggers for weight gain? Stress, Medication, and Injury  Previous weight loss programs? NO  Previous weight loss medications?  none    Reviewed [facility] patient contract: Readiness for Lifestyle change: 10/10, Interest in Medication: 10/10, Bariatric surgery interest: 5/10    Weight  Starting weight: 193  Max weight: 206  Lowest weight: 135    Wt Readings from Last 6 Encounters:   03/18/24 193 lb (87.5 kg)   01/02/24 193 lb (87.5 kg)   09/13/23 187 lb 12.8 oz (85.2 kg)   07/18/23 183 lb (83 kg)   06/16/23 179 lb (81.2 kg)   06/01/23 180 lb (81.6 kg)        Typical diet   Breakfast Lunch Dinner Snacks Fluids   Never eat breakfast  Leftovers from dinner   cooks- beef, beans, fast food   No snacks  Water: 5 bottles of water   Soda:  Juice:  Coffee/Tea:     Portion: medium  Eats 3 meals per day: yes   Number of restaurant or fast food meals/week: 4-5  meals/week    Social hx and lifestyle reviewed:    Work: [employer]   Marital status:   Support: yes  Tobacco use: none  ETOH use: 0  per/week  Supplements: none  Exercise: none, limited to RA and fibro pain, and left knee pain  Stress level: 10/10  Sleep hours and integrity: 4 Hours per night    MEDICAL HISTORY  PMH reviewed:   Cardiac disorders:negative   Depression/anxiety: anxiety/depression   Glaucoma: negative  Kidney stones: negative  Eating disorder: negative  Migraines/seizures: migraines (sometimes will start topamax)  Joint-related conditions: RA, fibro and left knee pain  Liver disease: negative  Thyroid disease: negative  Constipation: negative  Diabetes: type 2 diabetes  Sleep Apnea hx: snoring (has sleep study schedule for next month)  Cancer hx: negative  DVT: negative  Family or personal history of Pancreatic issues / Medullary Thyroid Cancer: negative  History of bariatric surgery: negative    FMH reviewed: obesity in parent/s or sibling: mothers side, brother     REVIEW OF SYSTEMS  GENERAL: feels well otherwise, and negative fatigue   SKIN: denies any rashes to skin folds  HEENT: snoring- yes; denies neck thickening  LUNGS: denies shortness of breath with exertion, no apnea  CARDIOVASCULAR: denies chest pain on exertion, denies palpitations or pedal edema  GI: denies abdominal pain, distention, No N/V/D/C  MUSCULOSKELETAL: +back pain, +multiple joint pains (RA, fibro), left knee  NEURO: denies headaches or dizziness  ENDOCRINE: denies any excess hunger, urination or thirst, denies any purple striae  PSYCH: denies change in behavior or mood, hx of anxiety/depression     EXAM  /82   Pulse 92   Resp 16   Ht 4' 11\" (1.499 m)   Wt 193 lb (87.5 kg)   BMI 38.98 kg/m² , needs to use body comp with next appt       GENERAL: well developed, well nourished, in no apparent distress  SKIN: warm, pink, dry without rashes to exposed area   EYES: conjunctiva pink, sclera non  icteric, PERRLA  HEENT: atraumatic, normocephalic, O/p: Mallampati score- 2  NECK: supple, non tender, no adenopathy, no thyromegaly  LUNGS: CTA in all fields, breathing non labored  CARDIO: RRR without murmur, normal S1 and S2 without clicks or gallops, no pedal edema  GI: +BS, soft, no masses, HSM or tenderness  EXTREMITIES: grossly intact  NEURO: Oriented times three, full ROM of bilateral UE/LE  PSYCH: pleasant, cooperative, normal mood and affect, no si/hi    Lab Results   Component Value Date     (H) 06/11/2023    BUN 9 06/11/2023    BUNCREA 15.6 06/09/2021    CREATSERUM 0.59 06/11/2023    ANIONGAP 6 06/11/2023    GFRNAA 120 09/20/2021    GFRAA 139 09/20/2021    CA 8.8 06/11/2023    OSMOCALC 287 06/11/2023    ALKPHO 110 (H) 09/13/2023    AST 53 (H) 09/13/2023     (H) 09/13/2023    BILT 0.2 09/13/2023    TP 7.5 09/13/2023    ALB 3.3 (L) 09/13/2023    GLOBULIN 4.2 06/11/2023     06/11/2023    K 3.7 06/11/2023     06/11/2023    CO2 26.0 06/11/2023     Lab Results   Component Value Date    A1C 7.4 (A) 03/18/2024     Lab Results   Component Value Date    CHOLEST 226 (H) 06/11/2023    TRIG 182 (H) 06/11/2023    HDL 41 06/11/2023     (H) 06/11/2023    VLDL 35 (H) 06/11/2023    NONHDLC 185 (H) 06/11/2023     Lab Results   Component Value Date    B12 438 09/13/2023     Lab Results   Component Value Date    VITD 13.3 (L) 11/20/2022       Current Outpatient Medications on File Prior to Visit   Medication Sig Dispense Refill    norethindrone 5 MG Oral Tab TAKE 1 TABLET BY MOUTH THREE TIMES DAILY FOR 3 DAYS. THEN 1 TABLET TWICE DAILY FOR 25 DAYS      amitriptyline 25 MG Oral Tab Take 1 tablet (25 mg total) by mouth nightly. 90 tablet 1    insulin regular human, conc, 500 UNIT/ML Subcutaneous Solution Pen-injector Uses up to 550 units daily via insulin pump 11 each 3    insulin regular human, conc, 500 UNIT/ML Subcutaneous Solution Pen-injector As directed for  units 15 mL 5     Continuous Blood Gluc Sensor (DEXCOM G6 SENSOR) Does not apply Misc USE AS DIRECTED AND CHANGE EVERY 10 DAYS 3 each 11    Continuous Blood Gluc Transmit (DEXCOM G6 TRANSMITTER) Does not apply Misc 1 Device every 3 (three) months. 1 each 3    GVOKE HYPOPEN 1-PACK 1 MG/0.2ML Subcutaneous SUBQ injection INJECT 0.2 ML (1 MG TOTAL) INTO THE SKIN ONCE AS NEEDED. SEVERE HYPOGLYCEMIA      Blood Glucose Monitoring Suppl (ONETOUCH VERIO IQ SYSTEM) w/Device Does not apply Kit Use as directed. 1 kit 0    Blood Glucose Monitoring Suppl (ONETOUCH VERIO FLEX SYSTEM) w/Device Does not apply Kit 1 Device As Directed.      SUMAtriptan 50 MG Oral Tab TAKE 1 TABLET BY MOUTH 1 TIME FOR UP TO 1 DOSE AS NEEDED FOR MIGRAINE      ipratropium-albuterol 0.5-2.5 (3) MG/3ML Inhalation Solution Take 3 mL by nebulization.       No current facility-administered medications on file prior to visit.       ASSESSMENT/PLAN    ICD-10-CM    1. Therapeutic drug monitoring  Z51.81       2. Obesity (BMI 30-39.9)  E66.9       3. Type 2 diabetes mellitus with hyperglycemia, with long-term current use of insulin ([facility])  E11.65     Z79.4       4. Rheumatoid arthritis of multiple sites with negative rheumatoid factor ([facility])  M06.09       5. Anxiety  F41.9         Initial Weight Data and Goal Weight Loss:  Weight Calculations  Initial Weight: 193 lbs  Initial Weight Date: 03/18/24  Today's Weight: 193 lbs  5% Goal: 9.65  10% Goal: 19.3  Total Weight Loss: 0 lbs  Initial consult: 193 lbs on 3/18/2024, Down  Lb:  lbs total     PLAN   Will hold on starting medications: would like to use mounjaro (has done it in the past, but stopped due to side effects)- has surgery in <1 month so will wait on starting this  --advised of side effects and adverse effects of this medication  Contradictions: caution stimulants (abnormal EKG)   Reviewed labs  Continue with vitamin d OTC  Low folic acid, not sure if she taking an extra supplement   Elevated liver enzymes, lifestyle  education done, managed by PCP  HLD  uncontrolled, elevated total, trigy, and LDL, managed by PCP   DM type 2 , reviewed last a1c 7.4% on 3/2024- see HPI (on insulin pump and CGM with endocrine)   Snoring, has sleep study ordered for next month  Anxiety/depression, stable  Multiple joint pain, recommended aqua therapy   Decrease carbs, increase protein, no skipping meals   Needs to incorporate exercise regimen FITTE: ACSM recommendations (150-300 minutes/ week in active weight loss)   Follow up with dietitian and psychologist as recommended.  Discussed the role of sleep and stress in weight management.  Counseled on comprehensive weight loss plan including attention to nutrition, exercise and behavior/stress management for success. See patient instruction below for more details.    Total time spent on chart review, pre-charting, obtaining history, counseling, and educating, reviewing labs was 50 minutes.       NOTE TO PATIENT: The 21st Century Cures Act makes clinical notes like these available to patients in the interest of transparency. Clinical notes are medical documents used by physicians and care providers to communicate with each other. These documents include medical language and terminology, abbreviations, and treatment information that may sound technical and at times possibly unfamiliar. In addition, at times, the verbiage may appear blunt or direct. These documents are one tool providers use to communicate relevant information and clinical opinions of the care providers in a way that allows common understanding of the clinical context.     Weight Loss Contract reviewed and signed today 3/18/2024    Patient Instructions   Welcome to the [facility]!!  Thank you for placing your trust in our health care team, I look forward to working with you along this journey to better health!  Next steps:     1.  Schedule a personal nutrition consultation with one of our registered  dieticians. Bring along your food journal (3 days minimum). See journal options below.  2.  when you are done with your procedure- we can think about starting mounjaro injection       Please try to work on the following dietary changes this first month:  Daily protein recommendation to start:  grams  Daily carbohydrate: <150g  Daily calories: 1,500  1.  Drink water with meals and throughout the day, cut down on soda and/or juice if consumed. Consider flavored water options like Bubbly, Spindrift, Hint and Sylvain.  2.  Eat breakfast daily and focus on having protein with each meal, examples include: greek yogurt, cottage cheese, hard boiled egg, whole grain toast with peanut butter.   3.  Reduce refined carbohydrates and sugars which includes items such as sweets, as well as rice, pasta, and bread and make sure to choose whole grain options when having them with just 1 serving per meal about the size of your inner palm.  4.  Consume non starchy veggies daily working towards making them a good 50% of your daily food intake. Add them to lunch and dinner consistently.  5.  Optional can start a daily probiotic: VSL#3, (order on line at www.vsl3.com). Take 1 capsule daily with water for 30 days, then reduce to 1 every other day (this will reduce the cost). Capsules can be left out for 2 weeks, but then must be refrigerated.      Please download neeru My Fitness Pal, LoseIt! Or Net Diary to monitor daily dietary intake and you will be able to see if you are eating the right amount of calories or too much or too little which would hinder weight loss. Additionally this will help to see your daily carbohydrate and protein intake. When you set the neeru up choose 1-2 lbs/week as a goal.  Keeping a paper food journal is an option as well to remain accountable for your choices- this is the start to mindful eating! A low calorie diet has been consistently shown to support weight loss.     Continue or start exercising to help  establish a routine. If not already exercising begin with 1 day and progress as able with long-term goal of 30 minutes 5 days a week at a minimum.     Meditation daily can help manage and control stress. Chronic stress can make weight loss difficult.  Exercising is one way to help with stress, but meditation using the CALM Mayra or another comparable alternative can be done in your home or place of work with little time commitment. This Mayra can also help work on behavior change and improve sleep. Check out the segment under Calm Masterclass and listen to The 4 Pillars of Health. A great way to begin learning about the foundation of lifestyle with practical tips to use in your every day.     Check out www.yourweightmatters.org blog for continued daily support and education along this weight loss journey!    Patient Resources:     Personal Training/Fitness Classes/Health Coaching     [facility] @ [email or web address] Full fitness center with group fitness and personal training. Discount available as client of [facility].  Health Coaching and Personal Training with Liz Gracia at our [facility]- individual weekly coaching with option to add personal training and small group fitness classes targeted at weight loss- [phone] and/or email @ [email or web address]  [employer] [place] [email or web address] Fitness [phone] and/or email Riana at [email or web address]  [facility] Fitness Centers with multiple locations: Behind the Burner (www.Sonora Leather), Zoona [employer] [place] Secure Fitness (www.Shoutlet.TabbedOut), BestSecret.com (www.Imagine K12), The Exercise  (www.exercisecoach.TabbedOut)     Online Fitness  Fitness  on Utube  Fit in 10 DVD series- www.fwhwf28QAY.com  Sit and Be Fit - Chair exercise series Www.sitandbefit.org  Hip Hop Fit with Baldemar Chang at www.hiphopfit.DerbySoft      Apps for on the Go Fitness  [employer] 7 Minute Workout (orange box with white 7) - free on the go HIIT training mayra  Peloton Mayra @ www.onepeloton.com     Nutrition Trackers and Tools  LoseIT! And My Fitness Pal apps and on line for tracking nutrition  NOOM - virtual health coaching  FitFoundation (healthy meals on the go) in [place] @ www.zfoobbpifztce9x.Sportcut  Bistro MD @ www.bistromd.com and Qyhgut62 (keto and low carb plans recommended) @ www.skaqnz52.com, Metabolic Meals @ www.MyMetabolicMeals.Sportcut - individual prepared meals to go  Gobble, Blue Apron, Home , Every Plate, Sunbasket- on line meal delivery programs for preparation at home  Meal [place] in [place] for homemade meals to go @ www.mealHeartThisage.Sportcut  Diet Doctor @ www.dietdoctor.Sportcut - low carb swaps  [employer] - meal prep and planning mayra (www.yummly.com)     Stress Management/Behavior/Mindful Eating  CALM meditation mayra (www.calm.com)  Headspace  Am I Hungry? Mindful eating virtual  mayra  Www.yourweightmatters.org - Obesity Action Coalition sponsored Blog posts daily  Motivation mayra (black box with white \")- daily supportive messages sent to your phone     Books/Video Education/Podcasts  Mindless Eating by Skip Cano  Why We Get Sick by James Marsh (a book about insulin resistance)  Atomic Habits by Norris Irizarry (a book about taking small steps to promote greater behavior change)   Can't Hurt Me by Jared Helton (a book exploring the power of discipline in achieving your goals)  The End of Dieting: How to Live for Life by Dr. Colt Cline M.D. or listen to The [employer] Podcast Episode 63: Understanding \"Nutritarian\" Eating w/Dr. Colt Cline  Your Body in Balance: The New Science of Food, Hormones, and Health by Dr. Justen Talley  The Menopause Diet Plan by Antionette Key and Es Ferrari  The Complete Guide to fasting by Dr. Rees  Sugar, Salt & Fat by Sobia Umana, Ph.D, R.D.  Weight Loss Surgery Will Not Treat Food Addiction by  Christy Mckee Ph.D  The Game Changers- Gemvara Documentary on plant based nutrition  Fed Up - documentary about obesity (Free on Utube)  The Truth About Sugar - documentary on sugar (Free on Utube, https://youtu.be/4O1pmxdMC7z)  The Dr. Acosta T5 Wellness Plan by Dr. Han Acosta MD  Fitlosophy Fitspiration - journal to better health (found at [employer] in fitness aisle)  What Happened to You?- a look at the impact trauma has on behavior written by Tonia Eller and Dr. Jose Beach  Whole Again by Guanako Phan - discovering your true self after trauma  Brittney Sims talk on Gemvara, The Call to Courage  Podcasts: The Exam Room by the Physician's Committee, Nutrition Facts by Dr. Silva    We are here to support you with weight loss, but please remember that you still need your primary care provider for your routine health maintenance.      No follow-ups on file.    Patient verbalizes understanding.    Kami Beauchamp, APRN

## 2024-03-19 PROBLEM — R74.8 ELEVATED LIVER ENZYMES: Status: ACTIVE | Noted: 2024-03-19

## 2024-03-19 PROBLEM — E66.9 OBESITY (BMI 30-39.9): Status: ACTIVE | Noted: 2024-03-19

## 2024-03-19 PROBLEM — E78.2 ELEVATED CHOLESTEROL WITH ELEVATED TRIGLYCERIDES: Status: ACTIVE | Noted: 2024-03-19

## 2024-04-21 ENCOUNTER — LAB ENCOUNTER (OUTPATIENT)
Dept: LAB | Facility: HOSPITAL | Age: 39
End: 2024-04-21
Attending: INTERNAL MEDICINE
Payer: MEDICAID

## 2024-04-21 DIAGNOSIS — R06.83 SNORING: ICD-10-CM

## 2024-04-21 DIAGNOSIS — M06.09 RHEUMATOID ARTHRITIS OF MULTIPLE SITES WITH NEGATIVE RHEUMATOID FACTOR (HCC): ICD-10-CM

## 2024-04-21 DIAGNOSIS — M79.7 FIBROMYALGIA: ICD-10-CM

## 2024-04-21 DIAGNOSIS — R06.81 APNEA: ICD-10-CM

## 2024-04-21 DIAGNOSIS — Z51.81 THERAPEUTIC DRUG MONITORING: ICD-10-CM

## 2024-04-21 LAB
ALBUMIN SERPL-MCNC: 3.4 G/DL (ref 3.4–5)
ALBUMIN/GLOB SERPL: 0.7 {RATIO} (ref 1–2)
ALP LIVER SERPL-CCNC: 71 U/L
ALT SERPL-CCNC: 37 U/L
ANION GAP SERPL CALC-SCNC: 8 MMOL/L (ref 0–18)
AST SERPL-CCNC: 30 U/L (ref 15–37)
BASOPHILS # BLD AUTO: 0.09 X10(3) UL (ref 0–0.2)
BASOPHILS NFR BLD AUTO: 0.8 %
BILIRUB SERPL-MCNC: 0.4 MG/DL (ref 0.1–2)
BUN BLD-MCNC: 8 MG/DL (ref 9–23)
CALCIUM BLD-MCNC: 9.2 MG/DL (ref 8.5–10.1)
CHLORIDE SERPL-SCNC: 107 MMOL/L (ref 98–112)
CO2 SERPL-SCNC: 21 MMOL/L (ref 21–32)
CREAT BLD-MCNC: 0.72 MG/DL
EGFRCR SERPLBLD CKD-EPI 2021: 110 ML/MIN/1.73M2 (ref 60–?)
EOSINOPHIL # BLD AUTO: 0.25 X10(3) UL (ref 0–0.7)
EOSINOPHIL NFR BLD AUTO: 2.2 %
ERYTHROCYTE [DISTWIDTH] IN BLOOD BY AUTOMATED COUNT: 13.5 %
FASTING STATUS PATIENT QL REPORTED: YES
GLOBULIN PLAS-MCNC: 4.6 G/DL (ref 2.8–4.4)
GLUCOSE BLD-MCNC: 92 MG/DL (ref 70–99)
HCT VFR BLD AUTO: 31 %
HGB BLD-MCNC: 9 G/DL
IMM GRANULOCYTES # BLD AUTO: 0.12 X10(3) UL (ref 0–1)
IMM GRANULOCYTES NFR BLD: 1.1 %
LYMPHOCYTES # BLD AUTO: 3.75 X10(3) UL (ref 1–4)
LYMPHOCYTES NFR BLD AUTO: 33.5 %
MCH RBC QN AUTO: 24.6 PG (ref 26–34)
MCHC RBC AUTO-ENTMCNC: 29 G/DL (ref 31–37)
MCV RBC AUTO: 84.7 FL
MONOCYTES # BLD AUTO: 0.67 X10(3) UL (ref 0.1–1)
MONOCYTES NFR BLD AUTO: 6 %
NEUTROPHILS # BLD AUTO: 6.3 X10 (3) UL (ref 1.5–7.7)
NEUTROPHILS # BLD AUTO: 6.3 X10(3) UL (ref 1.5–7.7)
NEUTROPHILS NFR BLD AUTO: 56.4 %
OSMOLALITY SERPL CALC.SUM OF ELEC: 280 MOSM/KG (ref 275–295)
PLATELET # BLD AUTO: 359 10(3)UL (ref 150–450)
PLATELETS.RETICULATED NFR BLD AUTO: 11.6 % (ref 0–7)
POTASSIUM SERPL-SCNC: 4.2 MMOL/L (ref 3.5–5.1)
PROT SERPL-MCNC: 8 G/DL (ref 6.4–8.2)
RBC # BLD AUTO: 3.66 X10(6)UL
SODIUM SERPL-SCNC: 136 MMOL/L (ref 136–145)
WBC # BLD AUTO: 11.2 X10(3) UL (ref 4–11)

## 2024-04-21 PROCEDURE — 85025 COMPLETE CBC W/AUTO DIFF WBC: CPT

## 2024-04-21 PROCEDURE — 80053 COMPREHEN METABOLIC PANEL: CPT

## 2024-04-21 PROCEDURE — 36415 COLL VENOUS BLD VENIPUNCTURE: CPT

## 2024-04-23 ENCOUNTER — PATIENT MESSAGE (OUTPATIENT)
Dept: INTERNAL MEDICINE CLINIC | Facility: CLINIC | Age: 39
End: 2024-04-23

## 2024-04-23 DIAGNOSIS — E11.65 TYPE 2 DIABETES MELLITUS WITH HYPERGLYCEMIA, WITH LONG-TERM CURRENT USE OF INSULIN (HCC): Primary | ICD-10-CM

## 2024-04-23 DIAGNOSIS — Z79.4 TYPE 2 DIABETES MELLITUS WITH HYPERGLYCEMIA, WITH LONG-TERM CURRENT USE OF INSULIN (HCC): Primary | ICD-10-CM

## 2024-04-23 RX ORDER — TIRZEPATIDE 2.5 MG/.5ML
2.5 INJECTION, SOLUTION SUBCUTANEOUS WEEKLY
Qty: 2 ML | Refills: 0 | Status: SHIPPED | OUTPATIENT
Start: 2024-04-23

## 2024-04-23 NOTE — TELEPHONE ENCOUNTER
From: Keke Dumont  To: Kami Beauchamp  Sent: 4/23/2024 11:25 AM CDT  Subject: Weight loss injections    Hello,    My surgery was cancelled and will not be getting rescheduled at all im ready to give those injections a second try.

## 2024-05-01 ENCOUNTER — OFFICE VISIT (OUTPATIENT)
Dept: RHEUMATOLOGY | Facility: CLINIC | Age: 39
End: 2024-05-01
Payer: MEDICAID

## 2024-05-01 VITALS
BODY MASS INDEX: 39.31 KG/M2 | OXYGEN SATURATION: 96 % | DIASTOLIC BLOOD PRESSURE: 64 MMHG | HEART RATE: 105 BPM | RESPIRATION RATE: 16 BRPM | WEIGHT: 195 LBS | SYSTOLIC BLOOD PRESSURE: 122 MMHG | HEIGHT: 59 IN | TEMPERATURE: 98 F

## 2024-05-01 DIAGNOSIS — R06.83 SNORING: ICD-10-CM

## 2024-05-01 DIAGNOSIS — M06.09 RHEUMATOID ARTHRITIS OF MULTIPLE SITES WITH NEGATIVE RHEUMATOID FACTOR (HCC): ICD-10-CM

## 2024-05-01 DIAGNOSIS — R06.81 APNEA: ICD-10-CM

## 2024-05-01 DIAGNOSIS — Z51.81 THERAPEUTIC DRUG MONITORING: ICD-10-CM

## 2024-05-01 DIAGNOSIS — D64.9 ANEMIA, UNSPECIFIED TYPE: Primary | ICD-10-CM

## 2024-05-01 DIAGNOSIS — M79.7 FIBROMYALGIA: ICD-10-CM

## 2024-05-01 PROCEDURE — 99214 OFFICE O/P EST MOD 30 MIN: CPT | Performed by: INTERNAL MEDICINE

## 2024-05-01 RX ORDER — HYDROXYCHLOROQUINE SULFATE 200 MG/1
200 TABLET, FILM COATED ORAL 2 TIMES DAILY
Qty: 180 TABLET | Refills: 1 | Status: SHIPPED | OUTPATIENT
Start: 2024-05-01

## 2024-05-01 RX ORDER — AMITRIPTYLINE HYDROCHLORIDE 25 MG/1
25 TABLET, FILM COATED ORAL NIGHTLY
Qty: 90 TABLET | Refills: 1 | Status: SHIPPED | OUTPATIENT
Start: 2024-05-01

## 2024-05-01 NOTE — PROGRESS NOTES
Rheumatology f/u Patient Note  =====================================================================================================    Chief complaint: seronegative RA    Chief Complaint   Patient presents with    Follow - Up     LOV 1/2/2024.pt not doing well. She states she can not even brush her hair due to the pain in her hands 6/10. She states she has a high pain tolerance. She has fibro pain all over. She states she ran out of her amitriptyline. Rapid 3 score is a 6.3.     PCP  [facility]   Bella Howard MD  [facility] Phone: [phone] ext [phone]  [facility] Fax: [phone]    =====================================================================================================  HPI   Date of visit: 11/14/2022    Keke Dumont is a 38 year old female     Patient presents for further evaluation of polyarthralgia.  Past medical history includes diabetes complicated by diabetic neuropathy, asthma requiring frequent corticosteroid tapers.  Polyarthralgia started in early 2022. This started after Covid infection shortly prior to development of arthralgia.  -Joints that are affected; wrists, ankles, elbows.  -Prior medications: tylenol (cut back b/c of mild ALT elevation)  -Movement makes pain worse.  -started working again,  at [employer], trying not to walk to much.   -Slipped and fell on stairs in feb 2022. Prior fall on ice x 3 years at [employer].   -Prior hx of UC, now rediagnosed as IBS.   -Multiple rounds of prednisone did not help.   -diabetic neuropathy in the feet. Chronic  -Diagnosis of chlamydia in September 2022.  No worsening of chronic arthritis.  Patient's arthralgia began several months prior to this infection  Medications:  Ibuprofen 800 mg BID: No benefit  Multiple rounds of prednisone 40 mg daily for asthma in the last 6 months without benef  ==============================================================================================================  Visit: 01/02/24  Did not get Mirena. New  job.- at insurance  -back on Humira  -on hydroxychloroquine (plaquenil)   -Hands much better  -doing cricket chi.  Widespread pain still severe.  -not sleeping much, 3-4 hours each evening. Snores a lot.   -11/2023.  EMG/NCS recently, noted below. Carpal tunnel syndrome is better. Intermittent use of carpal tunnel syndrome splints.  They are too long for her. Using special mouse   Conclusion:  1.  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  They are both mild to moderate in severity.  2.  There is no evidence of a cervical radiculopathy or plexopathy.  -Amitriptyline 10 mg daily: Taken at night, initial benefit noted.  Now wearing off  ==============================================================================================================  Today's Visit: 05/01/24    Had endometritis 2/2 IUD placement. Admitted for IV antibiotics. Now with recurrent UTI. Endometrial bleeding better.  -will be getting D&C to further evaluate the uterine situation.   -new primary Evelyn Paiz. Seeing soon.  -very nauseous.   -Had issues getting Humira approved.  Has been off this for 3 to 4 months  -Off hydroxychloroquine for quite some time as well.  -Ran out of amitriptyline, pain is worse due to this      5 point ROS negative except noted above  I had reviewed past medical and family histories together with allergy and medication lists documented.      Medications:  Current Outpatient Medications on File Prior to Visit   Medication Sig Dispense Refill    insulin regular human, conc, 500 UNIT/ML Subcutaneous Solution Pen-injector Uses up to 550 units daily via insulin pump 11 each 3    SUMAtriptan 50 MG Oral Tab TAKE 1 TABLET BY MOUTH 1 TIME FOR UP TO 1 DOSE AS NEEDED FOR MIGRAINE      Tirzepatide (MOUNJARO) 2.5 MG/0.5ML Subcutaneous Solution Pen-injector Inject 2.5 mg into the skin once a week. (Patient not taking: Reported on 5/1/2024) 2 mL 0    Continuous  Blood Gluc Sensor (DEXCOM G6 SENSOR) Does not apply Misc USE AS DIRECTED AND CHANGE EVERY 10 DAYS (Patient not taking: Reported on 5/1/2024) 3 each 11    Continuous Blood Gluc Transmit (DEXCOM G6 TRANSMITTER) Does not apply Misc 1 Device every 3 (three) months. (Patient not taking: Reported on 5/1/2024) 1 each 3    GVOKE HYPOPEN 1-PACK 1 MG/0.2ML Subcutaneous SUBQ injection INJECT 0.2 ML (1 MG TOTAL) INTO THE SKIN ONCE AS NEEDED. SEVERE HYPOGLYCEMIA (Patient not taking: Reported on 5/1/2024)      Blood Glucose Monitoring Suppl (ONETOUCH VERIO IQ SYSTEM) w/Device Does not apply Kit Use as directed. (Patient not taking: Reported on 5/1/2024) 1 kit 0    Blood Glucose Monitoring Suppl (ONETOUCH VERIO FLEX SYSTEM) w/Device Does not apply Kit 1 Device As Directed. (Patient not taking: Reported on 5/1/2024)       No current facility-administered medications on file prior to visit.   ?  Allergies:  Allergies   Allergen Reactions    Milk ANAPHYLAXIS    Penicillins HIVES    Aspirin RASH and NAUSEA AND VOMITING    Codeine NAUSEA AND VOMITING         Objective    Vitals:    05/01/24 1301   BP: 122/64   Pulse: 105   Resp: 16   Temp: 98.3 °F (36.8 °C)   SpO2: 96%   Weight: 195 lb (88.5 kg)   Height: 4' 11\" (1.499 m)     GEN: NAD, well-nourished.   HEENT: Head: NCAT. Face: No lesions. Eyes: Conjunctiva clear.   PULM:  easy effort  Extremities: No cyanosis, edema or deformities.   Neurologic: Strength, CN2-12 grossly intact   Skin: No lesions or rashes.  MSK: 28 joint count performed. No evidence of synovitis in mcp, pip, dip, wrist, elbows, shoulders, hips, knees, ankles, mtp unless otherwise noted. Full ROM of elbows, wrists, knees.     PtGA: 7  SJ: 0  TJ: 5  MDGA: 3    CDAI: 15    Mild/moderate widespread allodynia    ?  Labs:  Lab Results   Component Value Date    WBC 11.2 (H) 04/21/2024    RBC 3.66 (L) 04/21/2024    HGB 9.0 (L) 04/21/2024    HCT 31.0 (L) 04/21/2024    .0 04/21/2024    MCV 84.7 04/21/2024    MCH 24.6 (L)  04/21/2024    MCHC 29.0 (L) 04/21/2024    RDW 13.5 04/21/2024    NEPRELIM 6.30 04/21/2024    NEPERCENT 56.4 04/21/2024    LYPERCENT 33.5 04/21/2024    MOPERCENT 6.0 04/21/2024    EOPERCENT 2.2 04/21/2024    BAPERCENT 0.8 04/21/2024    NE 6.30 04/21/2024    LYMABS 3.75 04/21/2024    MOABSO 0.67 04/21/2024    EOABSO 0.25 04/21/2024    BAABSO 0.09 04/21/2024     Lab Results   Component Value Date    GLU 92 04/21/2024    BUN 8 (L) 04/21/2024    BUNCREA 15.6 06/09/2021    CREATSERUM 0.72 04/21/2024    ANIONGAP 8 04/21/2024    GFRNAA 120 09/20/2021    GFRAA 139 09/20/2021    CA 9.2 04/21/2024    OSMOCALC 280 04/21/2024    ALKPHO 71 04/21/2024    AST 30 04/21/2024    ALT 37 04/21/2024    BILT 0.4 04/21/2024    TP 8.0 04/21/2024    ALB 3.4 04/21/2024    GLOBULIN 4.6 (H) 04/21/2024     04/21/2024    K 4.2 04/21/2024     04/21/2024    CO2 21.0 04/21/2024 6/2023  Sed rate 39  CRP 1.08  CBC W differential WNL  Creatinine 2.59, AST 90,   A1c 7.4    3/2023  CBC W differential WNL  Creatinine 0.64, AST 71, ,     11/20/2022  CK 58  Vitamin B12 675  Vitamin D 13.3  TSH 2.240  Ferritin 103.1  G6PD 14.9  HLA-B27 negative  ANCA, MPO/AR-3 negative  RADHIKA is negative  Uric acid 3.9  CCP, RF negative  IGRA is negative  Sed rate 15  CRP 0.91  CBC normal.       9/2022:  Comment: Positive:  Presence of C. trachomatis (by MICHAEL)   Chlamydia trachomatis RNA detected by PCR.    HIV, hepatitis B surface antigen, RPR negative    7/2022  WBC 12.9, hemoglobin 14.3, platelets 363  Creatinine 0.76, ALT 54, rest of CMP normal    Additional Labs:    Radiology:    Radiology review:      =====================================================================================================  Assessment and Plan    Assessment:  1. Anemia, unspecified type    2. Fibromyalgia    3. Snoring    4. Apnea    5. Rheumatoid arthritis of multiple sites with negative rheumatoid factor (HCC)    6. Therapeutic drug monitoring       #seronegative RA:   -CDAI 26 (initially) ->5 (on Humira + hydroxychloroquine (plaquenil). CDAI: 13 being off adalimumab and hydroxychloroquine for the past 3 months.  This supports improvement in disease activity with the medications and worsening of her symptoms being off the medications.  -Off methotrexate due to recurrent elevation of AST/ALT though NAFLD is a significant finding factor.    #Fibromyalgia:  --Ran out of amitriptyline  --Active today  --Snoring and apneas noted    #Menorrhagia, recent endometritis s/p IV antibiotics  -Likely due to recent anemia that was appreciated.    #Diabetes complicated by diabetic neuropathy: Primarily of the lower extremities.  -- Suspect active in the upper extremities as well  #Bilateral carpal tunnel syndrome: improving overall with bracing  --11/2023 EMG/NCS with mild/moderate carpal tunnel syndrome    High risk medication labs including CMP and CBC w/ diff reviewed from 4/2024. LFTs from 9/13/2023. Elevated AST/ALT persistently noted. noted.  9/2022: HIV, hep C, hepatitis B surface antigen is negative  11/2022: Hepatitis B core antibody total, IGRA negative    Plan:  -Given anemia, add on iron studies, B12/folate with next set of blood work  -restart amitriptyline 25 mg at bedtime   -restart hydroxychloroquine (plaquenil) 200 mg twice daily  Will redo prior auth for Humira.   -wait to restart until 2 weeks after surgery and when surgeon says it is ok to restart.     Take over-the-counter iron supplement and multivitamin.     Schedule sleep study    -continue TENS unit Techcare Muscle Stimulator 24 massage mode unit. This can help for fibromyalgia and neck/back pain.   -For carpal tunnel syndrome, use wrist splints to be worn nightly.     Rtc 4 months    Diagnoses and all orders for this visit:    Anemia, unspecified type  -     Iron And Tibc; Future  -     Ferritin; Future  -     B12 AND FOLATE; Future  -     hydroxychloroquine 200 MG Oral Tab; Take 1 tablet (200 mg  total) by mouth 2 (two) times daily.    Fibromyalgia  -     Iron And Tibc; Future  -     Ferritin; Future  -     B12 AND FOLATE; Future  -     amitriptyline 25 MG Oral Tab; Take 1 tablet (25 mg total) by mouth nightly.  -     hydroxychloroquine 200 MG Oral Tab; Take 1 tablet (200 mg total) by mouth 2 (two) times daily.    Snoring  -     amitriptyline 25 MG Oral Tab; Take 1 tablet (25 mg total) by mouth nightly.  -     hydroxychloroquine 200 MG Oral Tab; Take 1 tablet (200 mg total) by mouth 2 (two) times daily.    Apnea  -     amitriptyline 25 MG Oral Tab; Take 1 tablet (25 mg total) by mouth nightly.  -     hydroxychloroquine 200 MG Oral Tab; Take 1 tablet (200 mg total) by mouth 2 (two) times daily.    Rheumatoid arthritis of multiple sites with negative rheumatoid factor (HCC)  -     amitriptyline 25 MG Oral Tab; Take 1 tablet (25 mg total) by mouth nightly.  -     hydroxychloroquine 200 MG Oral Tab; Take 1 tablet (200 mg total) by mouth 2 (two) times daily.    Therapeutic drug monitoring  -     amitriptyline 25 MG Oral Tab; Take 1 tablet (25 mg total) by mouth nightly.  -     hydroxychloroquine 200 MG Oral Tab; Take 1 tablet (200 mg total) by mouth 2 (two) times daily.              Return in about 4 months (around 9/1/2024).      The above plan of care, diagnosis, orders, and follow-up were discussed with the patient. Questions related to this recommended plan of care were answered.    Thank you for referring this delightful patient to me. Please feel free to contact me with any questions.     This report was performed utilizing speech recognition software technology. Despite proofreading, speech recognition errors could escape detection. If a word or phrase is confusing or out of context, please do not hesitate to call for   clarification.       Kind regards      Trenton Fisher MD  [facility]

## 2024-05-01 NOTE — PATIENT INSTRUCTIONS
-restart amitriptyline 25 mg at bedtime   -restart hydroxychloroquine (plaquenil) 200 mg twice daily  Will redo prior auth for Humira.   -wait to restart until 2 weeks after surgery and when surgeon says it is ok to restart.     Take over-the-counter iron supplement and multivitamin.     Schedule sleep study

## 2024-05-23 DIAGNOSIS — M06.09 RHEUMATOID ARTHRITIS OF MULTIPLE SITES WITH NEGATIVE RHEUMATOID FACTOR (HCC): Primary | ICD-10-CM

## 2024-05-23 RX ORDER — ADALIMUMAB 40MG/0.4ML
40 KIT SUBCUTANEOUS
Qty: 2 EACH | Refills: 2 | Status: SHIPPED | OUTPATIENT
Start: 2024-05-23 | End: 2024-06-20

## 2024-05-23 NOTE — TELEPHONE ENCOUNTER
PA for Humira approved. Effective 2/22/24-5/22/25  Case# [id number]  Up to 4 pens for 28days.

## 2024-05-31 ENCOUNTER — PATIENT MESSAGE (OUTPATIENT)
Dept: INTERNAL MEDICINE CLINIC | Facility: CLINIC | Age: 39
End: 2024-05-31

## 2024-05-31 NOTE — TELEPHONE ENCOUNTER
From: Keke Dumont  To: Kami Beauchamp  Sent: 5/31/2024 11:12 AM CDT  Subject: Regarding mounjaro    Hello,    I have still not been able to start the mounjaro due to it needing a prior authorization.    Thank you

## 2024-06-06 ENCOUNTER — TELEPHONE (OUTPATIENT)
Dept: ENDOCRINOLOGY CLINIC | Facility: CLINIC | Age: 39
End: 2024-06-06

## 2024-06-06 ENCOUNTER — TELEPHONE (OUTPATIENT)
Facility: CLINIC | Age: 39
End: 2024-06-06

## 2024-06-06 NOTE — TELEPHONE ENCOUNTER
Received fax from [employer] for CMN form old order has  asking to fax back too [phone] for patient pump supplies .    LOV with Yvonne Middleton was 10/11/2023 telemed. Pt established care with Dr Carroll emailed scanned to dave SINGLETON to hand to KR to review and advice if this should be sent to endo office

## 2024-06-06 NOTE — TELEPHONE ENCOUNTER
6/6/24-Received via fax from Yvonne Middleton who had previously seen patient an order from [employer] for CGM supplies.  Given to Vidhya LAWRENCE to review.

## 2024-07-19 ENCOUNTER — TELEPHONE (OUTPATIENT)
Facility: CLINIC | Age: 39
End: 2024-07-19

## 2024-07-19 ENCOUNTER — OFFICE VISIT (OUTPATIENT)
Facility: CLINIC | Age: 39
End: 2024-07-19
Payer: MEDICAID

## 2024-07-19 ENCOUNTER — LAB ENCOUNTER (OUTPATIENT)
Dept: LAB | Facility: HOSPITAL | Age: 39
End: 2024-07-19
Payer: MEDICAID

## 2024-07-19 VITALS
HEIGHT: 59 IN | SYSTOLIC BLOOD PRESSURE: 122 MMHG | OXYGEN SATURATION: 98 % | HEART RATE: 85 BPM | DIASTOLIC BLOOD PRESSURE: 74 MMHG | WEIGHT: 195 LBS | BODY MASS INDEX: 39.31 KG/M2

## 2024-07-19 DIAGNOSIS — E55.9 VITAMIN D DEFICIENCY: Primary | ICD-10-CM

## 2024-07-19 DIAGNOSIS — Z79.4 TYPE 2 DIABETES MELLITUS WITH HYPERGLYCEMIA, WITH LONG-TERM CURRENT USE OF INSULIN (HCC): Primary | ICD-10-CM

## 2024-07-19 DIAGNOSIS — R53.82 CHRONIC FATIGUE: ICD-10-CM

## 2024-07-19 DIAGNOSIS — E78.2 MIXED HYPERLIPIDEMIA: ICD-10-CM

## 2024-07-19 DIAGNOSIS — Z79.4 TYPE 2 DIABETES MELLITUS WITH HYPERGLYCEMIA, WITH LONG-TERM CURRENT USE OF INSULIN (HCC): ICD-10-CM

## 2024-07-19 DIAGNOSIS — E55.9 VITAMIN D DEFICIENCY: ICD-10-CM

## 2024-07-19 DIAGNOSIS — Z96.41 INSULIN PUMP IN PLACE: ICD-10-CM

## 2024-07-19 DIAGNOSIS — E11.65 TYPE 2 DIABETES MELLITUS WITH HYPERGLYCEMIA, WITH LONG-TERM CURRENT USE OF INSULIN (HCC): Primary | ICD-10-CM

## 2024-07-19 DIAGNOSIS — Z46.81 INSULIN PUMP TITRATION: ICD-10-CM

## 2024-07-19 DIAGNOSIS — E11.65 TYPE 2 DIABETES MELLITUS WITH HYPERGLYCEMIA, WITH LONG-TERM CURRENT USE OF INSULIN (HCC): ICD-10-CM

## 2024-07-19 LAB
CHOLEST SERPL-MCNC: 220 MG/DL (ref ?–200)
CREAT UR-SCNC: 144.5 MG/DL
HDLC SERPL-MCNC: 37 MG/DL (ref 40–59)
HEMOGLOBIN A1C: 7.2 % (ref 4.3–5.6)
LDLC SERPL CALC-MCNC: 156 MG/DL (ref ?–100)
MICROALBUMIN UR-MCNC: 3.2 MG/DL
MICROALBUMIN/CREAT 24H UR-RTO: 22.1 UG/MG (ref ?–30)
NONHDLC SERPL-MCNC: 183 MG/DL (ref ?–130)
T4 FREE SERPL-MCNC: 1.2 NG/DL (ref 0.8–1.7)
THYROGLOB SERPL-MCNC: <15 U/ML (ref ?–60)
THYROPEROXIDASE AB SERPL-ACNC: 39 U/ML (ref ?–60)
TRIGL SERPL-MCNC: 149 MG/DL (ref 30–149)
TSI SER-ACNC: 5.51 MIU/ML (ref 0.55–4.78)
VIT D+METAB SERPL-MCNC: 7.4 NG/ML (ref 30–100)
VLDLC SERPL CALC-MCNC: 29 MG/DL (ref 0–30)

## 2024-07-19 PROCEDURE — 86376 MICROSOMAL ANTIBODY EACH: CPT

## 2024-07-19 PROCEDURE — 84443 ASSAY THYROID STIM HORMONE: CPT

## 2024-07-19 PROCEDURE — 86800 THYROGLOBULIN ANTIBODY: CPT

## 2024-07-19 PROCEDURE — 82570 ASSAY OF URINE CREATININE: CPT

## 2024-07-19 PROCEDURE — 99215 OFFICE O/P EST HI 40 MIN: CPT

## 2024-07-19 PROCEDURE — 86341 ISLET CELL ANTIBODY: CPT

## 2024-07-19 PROCEDURE — 82043 UR ALBUMIN QUANTITATIVE: CPT

## 2024-07-19 PROCEDURE — 83036 HEMOGLOBIN GLYCOSYLATED A1C: CPT

## 2024-07-19 PROCEDURE — 80061 LIPID PANEL: CPT

## 2024-07-19 PROCEDURE — 84439 ASSAY OF FREE THYROXINE: CPT

## 2024-07-19 PROCEDURE — 36415 COLL VENOUS BLD VENIPUNCTURE: CPT

## 2024-07-19 PROCEDURE — 82306 VITAMIN D 25 HYDROXY: CPT

## 2024-07-19 PROCEDURE — 95251 CONT GLUC MNTR ANALYSIS I&R: CPT

## 2024-07-19 RX ORDER — PROCHLORPERAZINE 25 MG/1
1 SUPPOSITORY RECTAL
Qty: 1 EACH | Refills: 3 | Status: CANCELLED | OUTPATIENT
Start: 2024-07-19

## 2024-07-19 RX ORDER — BLOOD SUGAR DIAGNOSTIC
STRIP MISCELLANEOUS
Qty: 90 STRIP | Refills: 3 | Status: SHIPPED | OUTPATIENT
Start: 2024-07-19 | End: 2025-07-19

## 2024-07-19 RX ORDER — ACYCLOVIR 400 MG/1
1 TABLET ORAL
Qty: 9 EACH | Refills: 1 | Status: SHIPPED | OUTPATIENT
Start: 2024-07-19

## 2024-07-19 RX ORDER — ERGOCALCIFEROL 1.25 MG/1
CAPSULE ORAL
Qty: 12 CAPSULE | Refills: 0 | Status: SHIPPED | OUTPATIENT
Start: 2024-07-19

## 2024-07-19 RX ORDER — ORAL SEMAGLUTIDE 3 MG/1
3 TABLET ORAL DAILY
Qty: 30 TABLET | Refills: 0 | Status: SHIPPED | OUTPATIENT
Start: 2024-07-19 | End: 2024-08-18

## 2024-07-19 RX ORDER — PROCHLORPERAZINE 25 MG/1
SUPPOSITORY RECTAL
Qty: 3 EACH | Refills: 11 | Status: CANCELLED | OUTPATIENT
Start: 2024-07-19

## 2024-07-19 RX ORDER — ALBUTEROL SULFATE 90 UG/1
2 AEROSOL, METERED RESPIRATORY (INHALATION) EVERY 4 HOURS PRN
COMMUNITY
Start: 2024-05-30

## 2024-07-19 NOTE — TELEPHONE ENCOUNTER
Phoned [facility] [place] to request patients last dilated eye exam report. Will await report to send to scan.

## 2024-07-19 NOTE — TELEPHONE ENCOUNTER
RN phoned patient to discuss lab result notes- patient verbalized understanding of all and will repeat thyroid labs in 1 month    Reviewed that Bisi ordered TPO/Tg antibodies- these came back ok, will route back to see if Bisi VENEGAS has an additional note    Patient consents to plan for Rx of Vitamin D with repeat labs in 3 months- confirms [facility] on file    Labs and Rx ordered in TE

## 2024-07-19 NOTE — PROGRESS NOTES
[facility] Note    Name: Keke Dumont    Date: 7/19/2024    HISTORY OF PRESENT ILLNESS   Keke Dumont is a 39 year old female with PMHx significant for UC, RA, DM2 who presents for  DM mgmt.    Initial HPI consult in March 2024  Previously with DM center [facility], LOV 9/2023  Referred to endo for inadequate glycemic control    Diabetes history:  Type: 2 (antibodies negative)  Onset: 2016  Pt has not been admitted to the hospital for blood sugars.   Pt does not have a hx of pancreatitis.   Started on insulin in ~2022  Has been on Tandem insulin pump for about a year     Current DM regimen:  Humalog U-500 via T-slim with control IQ    Previous DM therapies:  Victoza,Trulicity - GI upset and nausea  Metformin - significant stomach cramping  Glipizide - significant cramping  MDI     Pt's had some low's since her LOV with THEO and her latest Tandem pump settings as adjusted  are as follows: uses U-500    Basal:  12 am 0.7 u/hr  2 am 0.45 u/hr  7 am 0.45 u/hr  8 pm 0.7 u/hr    Carbohydrate ratio:   12MN: 2     Insulin sensitivity:  12MN:  10  BG target:  12MN: 110    Her backup insulin plan is Humalog U-500 60U/44U/44U qAC TID   Last A1c value was 7.4% done 3/18/2024     Interpretations:  Pt's eating pattern is very erratic, relies on  mostly for cooking, sometimes she'll sleep late into the day and otherwise she eats late at night. No BG<70, however BG does drift lower overnight.    Has fibromyalgia and RA; uses cane to ambulate longer distances     DM Meds: insulin regular human (conc) Sopn - 500 UNIT/ML    Previously trialed/failed:   Victoza- severe nausea,Trulicity- stomach cramping   Metformin - significant stomach cramping  Glipizide - significant cramping    Interim hx:  7/2024--ally/ [facility] VENEGAS.Last A1c value was 7.2% done 7/19/2024. Here for pump follow up. She saw wt management clinic who recommended trial of mounjaro; per insurance had to try step therapy to get approval of mounjaro  -- she wasn't fond of starting rybelsus as she has trouble taking pills.  Given her RA/carpal tunnel, she has a hard time inserting her pump sites- so she'll go a few hours off the pump sometimes. Recent hospitalization for D&C after IUD causing abnormal ongoing bleeding. Ongoing fatigue, hx of vit D deficiency, sometimes takes vit D.     Current DM meds at visit: Tandem Tslim + Control IQ + Dexcom G6 + U500   Time Basal ICR ISF Active Insulin Time Target   MN 0.70 u/hr 8 20 5h (default) 110   2A 0.450 u/hr         7A 0.450 u/hr         8P 0.70 u/hr         TDD 79.77u (in U100, this is the equivalence of ~395 u/day)      Continuous Glucose Monitoring Interpretation  Keke Dumont has undergone continuous glucose monitoring with their CGM.  The blood glucose tracings were evaluated for two weeks prior to office visit.   Blood glucose tracings demonstrated areas of hyperglycemia post-meal, particularly post-dinnertime  There were occasional hypoglycemia, particularly overnight .  Her bolus doses account for 66% of her insulin intake; and appears to be under-basalized. She has hypoglycemia overnight once sleep mode is on and aiming for lower targets.             Objective:      REVIEW OF SYSTEMS  Ten point review of systems has been performed and is otherwise negative and/or non-contributory, except as described above.     Medications:     Current Outpatient Medications:     insulin regular human, conc, 500 UNIT/ML Subcutaneous Solution Pen-injector, Uses up to 500 units daily via insulin pump, Disp: 90 mL, Rfl: 2    albuterol 108 (90 Base) MCG/ACT Inhalation Aero Soln, Inhale 2 puffs into the lungs every 4 (four) hours as needed for Shortness of Breath., Disp: , Rfl:     Continuous Glucose Sensor (DEXCOM G7 SENSOR) Does not apply Misc, 1 each Every 10 days., Disp: 9 each, Rfl: 1    Semaglutide (RYBELSUS) 3 MG Oral Tab, Take 3 mg by mouth daily., Disp: 30 tablet, Rfl: 0    Glucose Blood (ONETOUCH VERIO) In Vitro Strip,  Use to test once daily as needed to calibrate dexcom., Disp: 90 strip, Rfl: 3    amitriptyline 25 MG Oral Tab, Take 1 tablet (25 mg total) by mouth nightly., Disp: 90 tablet, Rfl: 1    hydroxychloroquine 200 MG Oral Tab, Take 1 tablet (200 mg total) by mouth 2 (two) times daily., Disp: 180 tablet, Rfl: 1    GVOKE HYPOPEN 1-PACK 1 MG/0.2ML Subcutaneous SUBQ injection, , Disp: , Rfl:     Blood Glucose Monitoring Suppl (ONETOUCH VERIO IQ SYSTEM) w/Device Does not apply Kit, Use as directed., Disp: 1 kit, Rfl: 0    Blood Glucose Monitoring Suppl (ONETOUCH VERIO FLEX SYSTEM) w/Device Does not apply Kit, 1 Device As Directed., Disp: , Rfl:     SUMAtriptan 50 MG Oral Tab, TAKE 1 TABLET BY MOUTH 1 TIME FOR UP TO 1 DOSE AS NEEDED FOR MIGRAINE, Disp: , Rfl:      Allergies:   Allergies   Allergen Reactions    Milk ANAPHYLAXIS    Penicillins HIVES    Aspirin RASH and NAUSEA AND VOMITING    Codeine NAUSEA AND VOMITING       Social History:   Social History     Socioeconomic History    Marital status:    Tobacco Use    Smoking status: Former     Current packs/day: 0.00     Average packs/day: 0.5 packs/day for 15.0 years (7.5 ttl pk-yrs)     Types: Cigarettes     Start date: 2006     Quit date: 2021     Years since quittin.5    Smokeless tobacco: Never   Vaping Use    Vaping status: Never Used   Substance and Sexual Activity    Alcohol use: Not Currently    Drug use: Not Currently       Medical History:   Reviewed    Surgical history:   Past Surgical History:   Procedure Laterality Date    Other      torn ligament and tendon to left ankle.          PHYSICAL EXAM  Vitals:    24 0735   BP: 122/74   BP Location: Left arm   Patient Position: Sitting   Cuff Size: adult   Pulse: 85   SpO2: 98%   Weight: 195 lb (88.5 kg)   Height: 4' 11\" (1.499 m)       CONSTITUTIONAL:  awake, alert, cooperative, no apparent distress, and appears stated age  PSYCH: normal affect  LUNGS: breathing comfortably  CARDIOVASCULAR:   regular rate       Labs/Imaging: Reviewed.      Assessment & Plan:   (E11.65,  Z79.4) Type 2 diabetes mellitus with hyperglycemia, with long-term current use of insulin ([facility])  (primary encounter diagnosis)  Plan: POC Hgb A1C    Last A1c value was 7.2% done 7/19/2024.  Goal <7%. She is currently using Tandem control IQ with U-500. Challeng with making dose adjustments is that U-500 is intermediate-acting and pt has also had experiences with post-prandial low's. Given severe insulin resistance, she would benefit from GLP1a if tolerable. Will try sending rybelsus, advised to try for a few days and contact clinic if intolerant. Then can send Mounjaro. I advised she may have similar [facility], but she is willing to try.     Reports reveal severe post prandial hyperglycemia (likely due to the intermediate action of U500). On the pump she is under-basalized; I will cautiously titrate up her daytime basal settings and lower her overnight settings (she is dropping around 2-3am). On Tandem the auto-basal usually helps w/ this so unclear if it will make much of a difference, next I would likely adjust her ICR somewhat.  ISF appears appropriate. Ideally if we start Mounjaro,  we may be able to switch back to U200 in the pump.      Tandem Tslim Control IQ + U500 + Dexcom G7: Settings adjusted:  Time Basal ICR ISF Active Insulin Time Target   MN 0.70 u/hr --> 0.80 u/hr 8 20 5h (default) 110   2A 0.450 u/hr         4:30A  (New profile) 0.450 u/hr --> 0.350 u/hr         7A 0.70 u/hr       8P 0.70 u/hr --> 0.80 u/hr         TDD 79.77u (~close to ~395u/day if U100)    - stary rybelsus 3mg daily  - if intolerant will re-send mounjaro  - meet with [facility] and discuss [employer] needles  - previously trialed and intolerance of GLP, ORTEZ, metformin; discussed actos for insulin sensitization, pt politely declined  - check BG 4x/day using glucometer or CGM  - pump backup plan: Humalog U-500 60U/44U/44U before  breakfast/lunch/dinner respectively if pump fails.  - Discussed adding GLP-1 to current medication regimen. Discussed action, risk vs benefit, dosing, and potential side effects. - Patient denies hx of pancreatitis, gastroparesis, or personal or family hx of medullary thyroid CA or MEN 2.   - patient is not pregnant or intending to become pregnant    #Nephropathy screening/CKD:   To goal, neg for nephropathy  Lab Results   Component Value Date    EGFRCR 110 04/21/2024    MICROALBCREA 22.1 07/19/2024      #Blood pressure control: SBP is to goal <130   BP Readings from Last 1 Encounters:   07/19/24 122/74   BP Meds:  none    #Hyperlipidemia/Lipids: LDL is not to goal   Lab Results   Component Value Date     (H) 06/11/2023    TRIG 182 (H) 06/11/2023   Statin CI due to childbearing age. Needs repeat- ordered today       - Ophtho: no DR, Last Dilated Eye Exam: 03/04/23   Eye Exam shows Diabetic Retinopathy?: No --> done through [employer] [facility] in April 2024 - staff to obtain records    (E55.9) Vitamin D deficiency  (R53.82) Chronic fatigue  Plan: TSH and Free T4 [E], Thyroid peroxidase &         thyroglobulin ab  Reviewed that fatigue is often multifactorial and we have reviewed the common etiologies, both endocrine and non-endocrine.  - repeat TFTs today  - no anemia per CBC   - vit D- hx of deficiency. Will repeat & order replacement if deficient  - vit B12 - repeat given metformin use  - reviewed if all above normal, pt will continue to follow up with PCP      Addendum: TFTs c/w subclinical hypothyroidism after visit. Add on antiTPO/Tg. Plan to repeat in 1mo. Not taking thyroid meds.     Return in about 6 weeks (around 8/30/2024) for follow up on pump settings.  Prev Dr. Carroll patient, discussed will follow up with [facility] until Dr Carroll returns ~Jan 2025    A total of 45 minutes was spent today on obtaining history, reviewing pertinent labs, evaluating patient, providing multiple treatment options, reinforcing diet/exercise  and compliance, and completing documentation.       THEO Robledo  7/19/2024

## 2024-07-19 NOTE — TELEPHONE ENCOUNTER
PA generated in EPIC for Rybelsus 3 mg    Questions answered and sent to plan, awaiting determination.

## 2024-07-19 NOTE — PATIENT INSTRUCTIONS
Follow up plan:  With THEO Robledo: Return in about 6 weeks (around 8/30/2024) for follow up on pump settings.  - meet with Vidhya (our diabetes educator) in 1-2 weeks for:   carb counting refresh T1DM / info on TruSteel needles  - Visits with Kee are considered a 'nurse visit' and are an extension of your services you receive here. The visit will appear on your Mychart as a scheduled visit so you know when she is going to call you or meet with you. If you need to reschedule, please call and cancel to avoid a no show fee. If you do not show up to your appointment or miss her call within 15 minutes of the visit, you will be assessed a $40 late fee.    Plan:  - complete urine testing (in the lab), fasting labs   - start rybelsus 3mg daily on empty stomach- send me a message with how you are doing, any side effects  - upgrade to dexcom G7       Current DM meds at visit: Tandem Tslim + Control IQ + Dexcom G6 + U500   Time Basal ICR ISF Active Insulin Time Target   MN 0.70 u/hr --> 0.80 u/hr 8 20 5h (default) 110   2A 0.450 u/hr         4:30A  (New profile) 0.450 u/hr --> 0.350 u/hr         7A 0.70 u/hr       8P 0.70 u/hr --> 0.80 u/hr         TDD 79.77u    Updated/current diabetes medication instructions:  Diabetic Medications               insulin regular human, conc, 500 UNIT/ML Subcutaneous Solution Pen-injector (Taking) Uses up to 550 units daily via insulin pump    GVOKE HYPOPEN 1-PACK 1 MG/0.2ML Subcutaneous SUBQ injection (Taking)             Office phone number: [phone]; phones are open Monday-Friday 8:30-4:30.   Thank you for visiting our office. We look forward to working with you to reach your health goals. As a reminder, if you need refills, please request early so there is enough time to process the request. We ask that you provide at least 5 days' notice before a refill is due, so there is time to send a request to pharmacy, process the refill, and ensure there are no other problems with  obtaining the medication (backorders, prior authorization paperwork, etc). Routine refills will not be addressed on weekends, so please submit these requests during the week.    Blood sugar targets:  Before breakfast: , 2 hours after meals: <180 (preferably <150), A1C goal: <7.0%  Time in Range goal is higher than 80% if using a continuous glucose monitor (Dexcom or Asa).  Time in Range can be found within the Dexcom G7 neeru, on Clarity if using Dexcom G6, or on LibreView if using Asa.    HOW TO TREAT LOW BLOOD SUGAR (Hypoglycemia)  Low blood sugar= Less than 70, or if you start to have symptoms (below)  Symptoms: Shaking or trembling, fast heart rate, extreme hunger, sweating, confusion/difficulty concentrating, dizziness.    How to treat a low blood sugar if you are able to eat/drink: The Rule of 15  If you are using continuous glucose monitor that says you are low, but you do not have any symptoms, verify on fingerstick that your blood sugar is actually low before treating.   Eat 15 grams of carbs (see examples below)  Check your blood sugar after 15 minutes. If it’s still below your target range, have another serving.   Repeat these steps until it’s in your target range. Once it’s in range, if you're nervous about your sugar going low again, have a protein source (ie, a spoonful of peanut butter).   If you have a CGM you want to look for how your arrow has changed. If you arrow is pointed up or sideways after 15 min, give your CGM more time OR check with a finger stick. Try not to eat more food until at least 15 min after the first BG check - otherwise you risk having a rebound high.  If you are experiencing symptoms and you are unable to check your blood glucose for any reason, treat the hypoglycemia.  If someone has a low blood sugar and is unconscious: Don’t hesitate to call 911. If someone is unconscious and glucagon is not available or someone does not know how to use it, call 911  immediately.    To treat a low, I recommend you carry with you easy, pre-portioned treatment for low blood sugars that are 15G of carbs:   - Children sized squeeze pouch applesauce (high fiber + carbs help prevent too high of a spike)  - Small children's sized juicebox- 15g carb --> 4oz juice box  - Glucose tablets from [facility], you can find them near diabetes supplies --> Note, you will need to eat 3-4 tablets to get to 15g of carbs  - Children sized fruit snack pack- look for one with 15 grams of total carbohydrate  - Choice of how to treat your low is important. Complex carbs, or foods that contain fats along with carbs (like chocolate) can slow the absorption of glucose and should NOT be used to treat an emergency low

## 2024-07-19 NOTE — TELEPHONE ENCOUNTER
No note on TPO ab, we will just watch TFTs next month. To complete AFTER 8/19, no sooner  Refill sent to [facility] in [place], closing encounter

## 2024-07-19 NOTE — TELEPHONE ENCOUNTER
RN already reviewed with patient that antibodies were not positive. Patient verbalized understanding.     Closing Encounter.

## 2024-07-22 NOTE — TELEPHONE ENCOUNTER
7/22/24-Received via fax from [employer] approval for Rybelsus 3mg tablet effective 4/21/24-7/20/25.  Sent to scan.

## 2024-07-22 NOTE — TELEPHONE ENCOUNTER
7/22/24-Received via fax from [facility] a diabetic eye exam dated 4/20/24.  Given to MA to review.

## 2024-07-28 LAB — ZNT8 ABS: <15 U/ML

## 2024-08-09 ENCOUNTER — NURSE ONLY (OUTPATIENT)
Facility: CLINIC | Age: 39
End: 2024-08-09
Payer: MEDICAID

## 2024-08-09 DIAGNOSIS — E11.65 TYPE 2 DIABETES MELLITUS WITH HYPERGLYCEMIA, WITH LONG-TERM CURRENT USE OF INSULIN (HCC): Primary | ICD-10-CM

## 2024-08-09 DIAGNOSIS — Z79.4 TYPE 2 DIABETES MELLITUS WITH HYPERGLYCEMIA, WITH LONG-TERM CURRENT USE OF INSULIN (HCC): Primary | ICD-10-CM

## 2024-08-09 PROCEDURE — 99211 OFF/OP EST MAY X REQ PHY/QHP: CPT

## 2024-08-09 NOTE — PROGRESS NOTES
Carb Counting 101    Keke Dumont  5/30/1985     Referred by: THEO Treadwell     Medical Background      Past Medical History:    Arthritis    Asthma (HCC)    Diabetes (HCC)    Diabetes mellitus (HCC)      Lab Results   Component Value Date    A1C 7.2 (A) 07/19/2024    A1C 7.4 (A) 03/18/2024    A1C 7.4 (A) 05/23/2023         Patient has T2DM, seen today regarding carb counting with an insulin pump     Medication Review      DM Meds: insulin regular human (conc) Sopn - 500 UNIT/ML; Rybelsus Tabs - 3 MG        Patient States Currently Taking:     Tandem Tslim + Control IQ + Dexcom G6 + U500   Time Basal ICR ISF Active Insulin Time Target   MN 0.70 u/hr 8 20 5h (default) 110   2A 0.450 u/hr           7A 0.450 u/hr           8P 0.70 u/hr           TDD 79.77u (in U100, this is the equivalence of ~395 u/day)       Blood Glucose Review      Dexcom G6 Using phone and connected to clinic; report downloaded and added to provider folder as necessary        Tandem report shows that sleep mode is likely contributing to highs overnight, system unable to deliver needed corrections.  Assessd patient carb counting, which is within 10-15 g of true carb amount, data supports need for adjusted ISF and ICR to accommodate rising blood glucose levels after meals.     Education     Topics Reviewed:  - Importance of carbohydrates in the diet and for overall health  - Counting Carbohydrates with labels  - Counting Carbohydrates without labels  - Assessing serving size / portion management  - Impact of fiber, protein, and fat on blood sugars  - Common foods/snacks consumed and diabetes friendly alternatives or adjustments  - Culturally significant foods and how to balance for BG management  - Importance of accurately carb counting and, if applicable, taking insulin before eating  - Pump: Entering carbs correctly into pump for accurate dosing  - MDI: Counting up carbohydrates and dividing by ICR      Goals and Recommendations     Tandem  Tslim + Control IQ + Dexcom G6 + U500   Time Basal ICR ISF Active Insulin Time Target   MN 0.70 u/hr 8-->6 20 -->15 5h (default) 110   2A 0.450 u/hr           7A 0.450 u/hr           8P 0.70 u/hr             - Turn off sleep schedule to get corrections overnight   - Try [employer] infusion set and contact [facility] to switch if likes    Future Appointments   Date Time Provider Department Center   8/9/2024  8:45 AM [facility] DIABETIC EDUCATOR ENDO [facility] [place]   8/27/2024  8:45 AM Caprice Black APN [facility] [place]   8/30/2024  8:45 AM Trenton Fisher MD [facility] [place]        8/9/2024  Vidhya Hooks RN, MSN, BC-ADM, [facility]  Diabetes Care &      A total of 45 minutes was spent with the patient including chart review, discussion and education / advisement pertinent to patient and provider specified concerns as documented above.     Note to patient: The 21 Century Cures Act makes medical notes like these available to patients in the interest of transparency. However, be advised this is a medical document. It is intended as peer to peer communication. It is written in medical language and may contain abbreviations or verbiage that are unfamiliar. It may appear blunt or direct. Medical documents are intended to carry relevant information, facts as evident, and the clinical opinion of the practitioner.

## 2024-08-19 ENCOUNTER — LAB ENCOUNTER (OUTPATIENT)
Dept: LAB | Age: 39
End: 2024-08-19
Payer: MEDICAID

## 2024-08-19 DIAGNOSIS — R53.82 CHRONIC FATIGUE: ICD-10-CM

## 2024-08-19 LAB
T4 FREE SERPL-MCNC: 1.2 NG/DL (ref 0.8–1.7)
TSI SER-ACNC: 2.7 MIU/ML (ref 0.55–4.78)

## 2024-08-19 PROCEDURE — 36415 COLL VENOUS BLD VENIPUNCTURE: CPT

## 2024-08-19 PROCEDURE — 84443 ASSAY THYROID STIM HORMONE: CPT

## 2024-08-19 PROCEDURE — 84439 ASSAY OF FREE THYROXINE: CPT

## 2024-08-27 ENCOUNTER — OFFICE VISIT (OUTPATIENT)
Facility: CLINIC | Age: 39
End: 2024-08-27
Payer: MEDICAID

## 2024-08-27 VITALS
BODY MASS INDEX: 40.04 KG/M2 | WEIGHT: 198.63 LBS | SYSTOLIC BLOOD PRESSURE: 126 MMHG | OXYGEN SATURATION: 97 % | HEART RATE: 87 BPM | DIASTOLIC BLOOD PRESSURE: 82 MMHG | HEIGHT: 59 IN

## 2024-08-27 DIAGNOSIS — N91.5 OLIGOMENORRHEA, UNSPECIFIED TYPE: ICD-10-CM

## 2024-08-27 DIAGNOSIS — E11.65 TYPE 2 DIABETES MELLITUS WITH HYPERGLYCEMIA, WITH LONG-TERM CURRENT USE OF INSULIN (HCC): Primary | ICD-10-CM

## 2024-08-27 DIAGNOSIS — L70.9 ACNE, UNSPECIFIED ACNE TYPE: ICD-10-CM

## 2024-08-27 DIAGNOSIS — Z96.41 INSULIN PUMP IN PLACE: ICD-10-CM

## 2024-08-27 DIAGNOSIS — E55.9 VITAMIN D DEFICIENCY: ICD-10-CM

## 2024-08-27 DIAGNOSIS — Z79.4 TYPE 2 DIABETES MELLITUS WITH HYPERGLYCEMIA, WITH LONG-TERM CURRENT USE OF INSULIN (HCC): Primary | ICD-10-CM

## 2024-08-27 DIAGNOSIS — Z46.81 INSULIN PUMP TITRATION: ICD-10-CM

## 2024-08-27 PROCEDURE — 99215 OFFICE O/P EST HI 40 MIN: CPT

## 2024-08-27 NOTE — PROGRESS NOTES
[facility] Note    Name: Keke Dumont    Date: 8/27/2024    HISTORY OF PRESENT ILLNESS   Keke Dumont is a 39 year old female with PMHx significant for UC, RA, DM2 who presents for  DM mgmt.    Initial HPI consult in March 2024  Previously with DM center [facility] [place], LOV 9/2023  Referred to endo for inadequate glycemic control    Diabetes history:  Type: 2 (antibodies negative)  Onset: 2016  Pt has not been admitted to the hospital for blood sugars.   Pt does not have a hx of pancreatitis.   Started on insulin in ~2022  Has been on Tandem insulin pump for about a year     Current DM regimen:  Humalog U-500 via T-slim with control IQ    Previous DM therapies:  Victoza,Trulicity - GI upset and nausea  Metformin - significant stomach cramping  Glipizide - significant cramping  MDI     Pt's had some low's since her LOV with KERAN and her latest Tandem pump settings as adjusted  are as follows: uses U-500  Has fibromyalgia and RA; uses cane to ambulate longer distances     Previously trialed/failed:   Victoza- severe nausea,Trulicity- stomach cramping   Metformin - significant stomach cramping  Glipizide - significant cramping    Interim hx:  7/2024--w/ Bisi VENEGAS.Last A1c value was 7.2% done 7/19/2024. Here for pump follow up. She saw wt management clinic who recommended trial of mounjaro; per insurance had to try step therapy to get approval of mounjaro -- she wasn't fond of starting rybelsus as she has trouble taking pills.  Given her RA/carpal tunnel, she has a hard time inserting her pump sites- so she'll go a few hours off the pump sometimes. Recent hospitalization for D&C after IUD causing abnormal ongoing bleeding. Ongoing fatigue, hx of vit D deficiency, sometimes takes vit D.     8/27/2024-w/ Bisi VENEGAS.Last A1c value was 7.2% done 7/19/2024. Changes made last visit include starting rybelsus. Saw FELIBERTO-- turned off sleep mode.  Ongoing overnight lows.  Also had some life stressors, she is  currently living with her siblings about 40 minutes away.   LMP April 2024- working with gyne, noted to have pre cancerous cells in uterus. Hgb is stable.  Has acne and skin changes she would like to address  DM meds at visit: Tandem Tslim Control IQ + U500 + Dexcom G7: Settings adjusted:  Time Basal ICR ISF Active Insulin Time Target   MN 0.80 u/hr 6 15 5h (default) 110   2A 0.450 u/hr         4:30A  (New profile) 0.350 u/hr         7A 0.70 u/hr       8P 0.80 u/hr         TDD 84.70u/day (~420u/day in U100)    Continuous Glucose Monitoring Interpretation  Keke Dumont has undergone continuous glucose monitoring with their CGM.  The blood glucose tracings were evaluated for two weeks prior to office visit.   Blood glucose tracings demonstrated areas of hyperglycemia post-meal, particularly post-dinner  There were several areas of hypoglycemia noted. Still having lows around 2a-4a.            Objective:      REVIEW OF SYSTEMS  Ten point review of systems has been performed and is otherwise negative and/or non-contributory, except as described above.     Medications:     Current Outpatient Medications:     insulin regular human, conc, 500 UNIT/ML Subcutaneous Solution Pen-injector, Uses up to 500 units daily via insulin pump, Disp: 90 mL, Rfl: 2    albuterol 108 (90 Base) MCG/ACT Inhalation Aero Soln, Inhale 2 puffs into the lungs every 4 (four) hours as needed for Shortness of Breath., Disp: , Rfl:     Continuous Glucose Sensor (DEXCOM G7 SENSOR) Does not apply Misc, 1 each Every 10 days., Disp: 9 each, Rfl: 1    Glucose Blood (ONETOUCH VERIO) In Vitro Strip, Use to test once daily as needed to calibrate dexcom., Disp: 90 strip, Rfl: 3    ergocalciferol 1.25 MG (26261 UT) Oral Cap, Take 1 capsule (50,000IU total) once weekly for 12 weeks. 3 months supply. Then repeat vitamin D lab., Disp: 12 capsule, Rfl: 0    GVOKE HYPOPEN 1-PACK 1 MG/0.2ML Subcutaneous SUBQ injection, , Disp: , Rfl:     Blood Glucose Monitoring  Suppl (ONETOUCH VERIO IQ SYSTEM) w/Device Does not apply Kit, Use as directed., Disp: 1 kit, Rfl: 0    Blood Glucose Monitoring Suppl (ONETOUCH VERIO FLEX SYSTEM) w/Device Does not apply Kit, 1 Device As Directed., Disp: , Rfl:     SUMAtriptan 50 MG Oral Tab, TAKE 1 TABLET BY MOUTH 1 TIME FOR UP TO 1 DOSE AS NEEDED FOR MIGRAINE, Disp: , Rfl:     amitriptyline 25 MG Oral Tab, Take 1 tablet (25 mg total) by mouth nightly., Disp: 90 tablet, Rfl: 1    hydroxychloroquine 200 MG Oral Tab, Take 1 tablet (200 mg total) by mouth 2 (two) times daily., Disp: 180 tablet, Rfl: 1     Allergies:   Allergies   Allergen Reactions    Milk ANAPHYLAXIS    Penicillins HIVES    Aspirin RASH and NAUSEA AND VOMITING    Codeine NAUSEA AND VOMITING       Social History:   Social History     Socioeconomic History    Marital status:    Tobacco Use    Smoking status: Former     Current packs/day: 0.00     Average packs/day: 0.5 packs/day for 15.0 years (7.5 ttl pk-yrs)     Types: Cigarettes     Start date: 2006     Quit date: 2021     Years since quittin.6    Smokeless tobacco: Never   Vaping Use    Vaping status: Never Used   Substance and Sexual Activity    Alcohol use: Not Currently    Drug use: Not Currently       Medical History:   Reviewed    Surgical history:   Past Surgical History:   Procedure Laterality Date    Other      torn ligament and tendon to left ankle.          PHYSICAL EXAM  Vitals:    24 0843   BP: 126/82   BP Location: Left arm   Patient Position: Sitting   Cuff Size: large   Pulse: 87   SpO2: 97%   Weight: 198 lb 9.6 oz (90.1 kg)   Height: 4' 11\" (1.499 m)         CONSTITUTIONAL:  awake, alert, cooperative, no apparent distress, and appears stated age  PSYCH: normal affect  LUNGS: breathing comfortably  CARDIOVASCULAR:  regular rate   SKIN: facial acne present. +bilateral acanthosis nigricans on neck folds      Labs/Imaging: Reviewed.    Thyroid labs:  Recent Labs     22  1157  07/19/24  0847 08/19/24  1029   T4F  --  1.2 1.2   TSH 2.240 5.514* 2.704      DM Antibodies:   Per previous endo note:   LYLE-65 Antibody  <5 IU/mL <5  Insulin Antibodies  <0.4 U/mL <0.4         Component  Ref Range & Units 7/19/24  8:47 AM   ZNT8 Abs  U/mL <15            Assessment & Plan:     ICD-10-CM    1. Type 2 diabetes mellitus with hyperglycemia, with long-term current use of insulin ([facility])  E11.65     Z79.4       2. Insulin pump in place  Z96.41       3. Insulin pump titration  Z46.81       4. Vitamin D deficiency  E55.9       5. Oligomenorrhea, unspecified type  N91.5 17-OH-Progesterone     Estradiol     LH (Luteinizing Hormone)     FSH     HCG, Beta Subunit (Quant Pregnancy Test)     ACTH, Plasma     Prolactin     Testosterone,Total and Weakly Bound w/ SHBG      6. Acne, unspecified acne type  L70.9           (E11.65,  Z79.4) Type 2 diabetes mellitus with hyperglycemia, with long-term current use of insulin ([facility])  (primary encounter diagnosis)  Plan: POC Hgb A1C    Pleasant 39 year old female with PMHx Type 2 diabetes mellitus dx'd age 31- in 2016; CHARITY ab negative.  Last A1c value was 7.2% done 7/19/2024.  Goal <7%. She is currently using Tandem control IQ with U-500. Given severe insulin resistance, she would benefit from GLP1a if tolerable, however she has had significant abdominal cramping from all of the GLP-1 a we have tried.  She is going to see GI soon for this, so wants to hold off on Mounjaro start for now.  We will reconsider in 3 months.    Reports reveal severe post prandial hyperglycemia (likely due to the intermediate action of U500).  She also is consistently having lows around 2-4a which are preceded by control IQ boluses-- will lighten the ISF after MN.  She is under basalized during the day so we will readjust those settings a bit as well.     Tandem Tslim Control IQ + U500 + Dexcom G7: Settings adjusted:  Time Basal ICR ISF Active Insulin Time Target   MN 0.80 u/hr 6 --> 5 15 --> 20 5h  (default) 110   2A 0.450 u/hr 5 20       4:30A 0.350 u/hr 5 20       7A 0.70 u/hr --> 1.0 u/hr 5 15     8P (Delete) 0.80 u/hr --> 1.0 u/hr             - she wants to hold off on mounjaro for now despite discussion  - also discussed SGLT2i-- she has frequent yeast infxn and doesn't want to try it out right now  - meet with [facility] and discuss TruSteel needles  - previously trialed and intolerance of GLP, ORTEZ, metformin; discussed actos for insulin sensitization, pt politely declined  - Did not tolerate:  Victoza- severe nausea,Trulicity- stomach cramping, rybelsus- stomach cramping  - check BG 4x/day using glucometer or CGM  - pump backup plan: Humalog U-500 60U/44U/44U before breakfast/lunch/dinner respectively if pump fails.  - Discussed adding GLP-1 to current medication regimen. Discussed action, risk vs benefit, dosing, and potential side effects. - Patient denies hx of pancreatitis, gastroparesis, or personal or family hx of medullary thyroid CA or MEN 2.   - patient is not pregnant or intending to become pregnant    Nephropathy screening   To goal, neg for nephropathy  Lab Results   Component Value Date    EGFRCR 110 04/21/2024    MICROALBCREA 22.1 07/19/2024      Blood pressure control: SBP is to goal <130   BP Readings from Last 1 Encounters:   08/27/24 126/82   BP Meds:  none    Lipids: LDL is not to goal   Lab Results   Component Value Date     (H) 07/19/2024    TRIG 149 07/19/2024   Statin CI due to childbearing age.        #Vitamin D deficiency  #Chronic fatigue  Plan:   Reviewed that fatigue is often multifactorial and we have reviewed the common etiologies, both endocrine and non-endocrine.  - repeat TFTs today  - no anemia per CBC   - vit D- deficient; now taking weekly replacement-- recheck in fall 2024  - vit B12 - (due for repeat)   - reviewed if all above normal, pt will continue to follow up with PCP      #Amennorhea  Following with gyne for amenorrhea s/p IUD removal April 2024. Unable to  see outside records. She has an upcoming visit w/ them Sept 2024. Given hx of acne, hirsutism, abnormal menses, insulin resistance on U500 insulin pump-- she also likely has PCOS; will obtain labs.  The differential diagnosis includes PCOS vs nonclassic CAH vs cortisol or prolactin or thyroid abnormalities, all of which may have similar overlapping features to PCOS including menstrual irregularities +/- hyperandrogenism  -Lab ordered: check PRL, TSH, acth/cortisol, hcg, FSH/E2/T, 17OH progesterone  - continue follow up with gyne    Return in about 3 months (around 11/27/2024) for follow up on pump settings.  Prev Dr. Carroll patient, discussed will follow up with [facility] until Dr Carroll returns ~Jan 2025    A total of 45 minutes was spent today on obtaining history, reviewing pertinent labs, evaluating patient, providing multiple treatment options, reinforcing diet/exercise and compliance, and completing documentation.       THEO Robledo  8/27/2024

## 2024-08-27 NOTE — PATIENT INSTRUCTIONS
Follow up plan:  With THEO Robledo: Return in about 3 months (around 11/27/2024) for follow up on pump settings.    - complete fasting labs in the AM       Tandem pump + U500 insulin:   Time Basal ICR ISF Active Insulin Time Target   MN 0.80 u/hr 6 --> 5 15 --> 20 5h (default) 110   2A 0.450 u/hr 5 20       4:30A 0.350 u/hr 5 20       7A 0.70 u/hr --> 1.0 u/hr 5 15     8P (Delete) 0.80 u/hr --> 1.0 u/hr             Updated/current diabetes medication instructions:  Diabetic Medications               insulin regular human, conc, 500 UNIT/ML Subcutaneous Solution Pen-injector (Taking) Uses up to 500 units daily via insulin pump    GVOKE HYPOPEN 1-PACK 1 MG/0.2ML Subcutaneous SUBQ injection (Taking)             Office phone number: [phone]; phones are open Monday-Friday 8:30-4:30.   Thank you for visiting our office. We look forward to working with you to reach your health goals. As a reminder, if you need refills, please request early so there is enough time to process the request. We ask that you provide at least 5 days' notice before a refill is due, so there is time to send a request to pharmacy, process the refill, and ensure there are no other problems with obtaining the medication (backorders, prior authorization paperwork, etc). Routine refills will not be addressed on weekends, so please submit these requests during the week.    Blood sugar targets:  Before breakfast: , 2 hours after meals: <180 (preferably <150), A1C goal: <7.0%  Time in Range goal is higher than 80% if using a continuous glucose monitor (Dexcom or Asa).  Time in Range can be found within the Dexcom G7 neeru, on Clarity if using Dexcom G6, or on LibreView if using Asa.    HOW TO TREAT LOW BLOOD SUGAR (Hypoglycemia)  Low blood sugar= Less than 70, or if you start to have symptoms (below)  Symptoms: Shaking or trembling, fast heart rate, extreme hunger, sweating, confusion/difficulty concentrating, dizziness.    How to  treat a low blood sugar if you are able to eat/drink: The Rule of 15  If you are using continuous glucose monitor that says you are low, but you do not have any symptoms, verify on fingerstick that your blood sugar is actually low before treating.   Eat 15 grams of carbs (see examples below)  Check your blood sugar after 15 minutes. If it’s still below your target range, have another serving.   Repeat these steps until it’s in your target range. Once it’s in range, if you're nervous about your sugar going low again, have a protein source (ie, a spoonful of peanut butter).   If you have a CGM you want to look for how your arrow has changed. If you arrow is pointed up or sideways after 15 min, give your CGM more time OR check with a finger stick. Try not to eat more food until at least 15 min after the first BG check - otherwise you risk having a rebound high.  If you are experiencing symptoms and you are unable to check your blood glucose for any reason, treat the hypoglycemia.  If someone has a low blood sugar and is unconscious: Don’t hesitate to call 911. If someone is unconscious and glucagon is not available or someone does not know how to use it, call 911 immediately.    To treat a low, I recommend you carry with you easy, pre-portioned treatment for low blood sugars that are 15G of carbs:   - Children sized squeeze pouch applesauce (high fiber + carbs help prevent too high of a spike)  - Small children's sized juicebox- 15g carb --> 4oz juice box  - Glucose tablets from [employer], you can find them near diabetes supplies --> Note, you will need to eat 3-4 tablets to get to 15g of carbs  - Children sized fruit snack pack- look for one with 15 grams of total carbohydrate  - Choice of how to treat your low is important. Complex carbs, or foods that contain fats along with carbs (like chocolate) can slow the absorption of glucose and should NOT be used to treat an emergency low

## 2024-08-30 ENCOUNTER — OFFICE VISIT (OUTPATIENT)
Dept: RHEUMATOLOGY | Facility: CLINIC | Age: 39
End: 2024-08-30
Payer: MEDICAID

## 2024-08-30 VITALS
BODY MASS INDEX: 39.74 KG/M2 | HEIGHT: 59 IN | OXYGEN SATURATION: 96 % | RESPIRATION RATE: 16 BRPM | SYSTOLIC BLOOD PRESSURE: 136 MMHG | TEMPERATURE: 98 F | DIASTOLIC BLOOD PRESSURE: 86 MMHG | HEART RATE: 107 BPM | WEIGHT: 197.13 LBS

## 2024-08-30 DIAGNOSIS — E11.49 OTHER DIABETIC NEUROLOGICAL COMPLICATION ASSOCIATED WITH TYPE 2 DIABETES MELLITUS (HCC): ICD-10-CM

## 2024-08-30 DIAGNOSIS — M06.00 SERONEGATIVE RHEUMATOID ARTHRITIS (HCC): ICD-10-CM

## 2024-08-30 DIAGNOSIS — Z51.81 THERAPEUTIC DRUG MONITORING: ICD-10-CM

## 2024-08-30 DIAGNOSIS — M70.61 GREATER TROCHANTERIC BURSITIS OF BOTH HIPS: ICD-10-CM

## 2024-08-30 DIAGNOSIS — R06.83 SNORING: ICD-10-CM

## 2024-08-30 DIAGNOSIS — M79.18 CHRONIC MUSCULOSKELETAL PAIN: ICD-10-CM

## 2024-08-30 DIAGNOSIS — M70.62 GREATER TROCHANTERIC BURSITIS OF BOTH HIPS: ICD-10-CM

## 2024-08-30 DIAGNOSIS — R06.81 APNEA: ICD-10-CM

## 2024-08-30 DIAGNOSIS — G89.29 CHRONIC MUSCULOSKELETAL PAIN: ICD-10-CM

## 2024-08-30 DIAGNOSIS — M79.7 FIBROMYALGIA: Primary | ICD-10-CM

## 2024-08-30 PROCEDURE — 99214 OFFICE O/P EST MOD 30 MIN: CPT | Performed by: INTERNAL MEDICINE

## 2024-08-30 RX ORDER — DULOXETIN HYDROCHLORIDE 30 MG/1
CAPSULE, DELAYED RELEASE ORAL
Qty: 165 CAPSULE | Refills: 1 | Status: SHIPPED | OUTPATIENT
Start: 2024-08-30 | End: 2024-11-28

## 2024-08-30 NOTE — PROGRESS NOTES
Rheumatology f/u Patient Note  =====================================================================================================    Chief complaint: seronegative RA  Chief Complaint   Patient presents with    Follow - Up     LOV 5/1/2024  Rapid 8.7  Patient states she has a lot of pain. She no longer lift her legs to dress herself or do certain things. She has limited mobility in her legs. Lt hip pain 7/10. She states she stopped the amitriptyline and Plaquenil due to stomach pain. She states she has IBS. Her endo thinks she may have Celiac disease. She bruises easily and has swelling in her lower extremities. Lots of stress at home.      PCP  [facility]   Bella Howard MD  [facility] Phone: [phone] ext [phone]  [facility] Fax: [phone]  =====================================================================================================  HPI   Date of visit: 11/14/2022    Keke Dumont is a 39 year old female   Patient presents for further evaluation of polyarthralgia.  Past medical history includes diabetes complicated by diabetic neuropathy, asthma requiring frequent corticosteroid tapers.  Polyarthralgia started in early 2022. This started after Covid infection shortly prior to development of arthralgia.  -Joints that are affected; wrists, ankles, elbows.  -Prior medications: tylenol (cut back b/c of mild ALT elevation)  -Movement makes pain worse.  -started working again,  at [employer], trying not to walk to much.   -Slipped and fell on stairs in feb 2022. Prior fall on ice x 3 years at [facility].   -Prior hx of UC, now rediagnosed as IBS.   -Multiple rounds of prednisone did not help.   -diabetic neuropathy in the feet. Chronic  -Diagnosis of chlamydia in September 2022.  No worsening of chronic arthritis.  Patient's arthralgia began several months prior to this infection  Medications:  Ibuprofen 800 mg BID: No benefit  Multiple rounds of prednisone 40 mg daily for asthma in the last 6 months without  benef  ==============================================================================================================  Visit: 01/02/24  Did not get Mirena. New job.- at insurance  -back on Humira  -on hydroxychloroquine (plaquenil)   -Hands much better  -doing cricket chi.  Widespread pain still severe.  -not sleeping much, 3-4 hours each evening. Snores a lot.   -11/2023.  EMG/NCS recently, noted below. Carpal tunnel syndrome is better. Intermittent use of carpal tunnel syndrome splints.  They are too long for her. Using special mouse   Conclusion:  1.  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  They are both mild to moderate in severity.  2.  There is no evidence of a cervical radiculopathy or plexopathy.  -Amitriptyline 10 mg daily: Taken at night, initial benefit noted.  Now wearing off  ==============================================================================================================  Visit: 05/01/24  Had endometritis 2/2 IUD placement. Admitted for IV antibiotics. Now with recurrent UTI. Endometrial bleeding better.  -will be getting D&C to further evaluate the uterine situation.   -new primary Evelyn Paiz. Seeing soon.  -very nauseous.   -Had issues getting Humira approved.  Has been off this for 3 to 4 months  -Off hydroxychloroquine for quite some time as well.  -Ran out of amitriptyline, pain is worse due to this  ==============================================================================================================  Today's Visit: 08/30/24      Unstable address. Couch surfing, staying in [place].  The patient's  went into a manic episode due to underlying bipolar.  Was punching the walls.  Patient called the police.  Police tried to arrest the patient.  Patient could not put her wrist together around her back because of shoulder internal rotation limitations.  She has bad fibromyalgia, possible rheumatoid  arthritis, diabetic neuropathy.  Cannot get Humira, mobility is not good.  Bilateral hips are painful.  Cannot lift leg  -restraining order at home.  -glc rise with cortisone.  Cannot get cortisone injections  -Has been out of amitriptyline since she cannot get the medication from home.  Pain is worse.  Diabetic neuropathy somewhat better.  Has been out of Humira for quite some time.  Has not obtained her gynecologic surgery yet.    Medications:  Current Outpatient Medications on File Prior to Visit   Medication Sig Dispense Refill    insulin regular human, conc, 500 UNIT/ML Subcutaneous Solution Pen-injector Uses up to 500 units daily via insulin pump 90 mL 2    albuterol 108 (90 Base) MCG/ACT Inhalation Aero Soln Inhale 2 puffs into the lungs every 4 (four) hours as needed for Shortness of Breath.      GVOKE HYPOPEN 1-PACK 1 MG/0.2ML Subcutaneous SUBQ injection       SUMAtriptan 50 MG Oral Tab TAKE 1 TABLET BY MOUTH 1 TIME FOR UP TO 1 DOSE AS NEEDED FOR MIGRAINE      Continuous Glucose Sensor (DEXCOM G7 SENSOR) Does not apply Misc 1 each Every 10 days. (Patient not taking: Reported on 8/30/2024) 9 each 1    Glucose Blood (ONETOUCH VERIO) In Vitro Strip Use to test once daily as needed to calibrate dexcom. (Patient not taking: Reported on 8/30/2024) 90 strip 3    Blood Glucose Monitoring Suppl (ONETOUCH VERIO IQ SYSTEM) w/Device Does not apply Kit Use as directed. (Patient not taking: Reported on 8/30/2024) 1 kit 0    Blood Glucose Monitoring Suppl (ONETOUCH VERIO FLEX SYSTEM) w/Device Does not apply Kit 1 Device As Directed. (Patient not taking: Reported on 8/30/2024)       No current facility-administered medications on file prior to visit.   ?  Allergies:  Allergies   Allergen Reactions    Milk ANAPHYLAXIS    Penicillins HIVES    Aspirin RASH and NAUSEA AND VOMITING    Codeine NAUSEA AND VOMITING    Ibuprofen NAUSEA AND VOMITING         Objective    Vitals:    08/30/24 0834   BP: 136/86   Pulse: 107   Resp: 16    Temp: 98.1 °F (36.7 °C)   SpO2: 96%   Weight: 197 lb 1.9 oz (89.4 kg)   Height: 4' 11\" (1.499 m)     GEN: NAD, well-nourished.   HEENT: Head: NCAT. Face: No lesions. Eyes: Conjunctiva clear.   PULM:  easy effort  Extremities: No cyanosis, edema or deformities.   Neurologic: Strength, CN2-12 grossly intact   Skin: No lesions or rashes.  MSK: 28 joint count performed. No evidence of synovitis in mcp, pip, dip, wrist, elbows, shoulders, hips, knees, ankles, mtp unless otherwise noted. Full ROM of elbows, wrists, knees.       PtGA: 9  SJ: 0  TJ: 10  MDGA: 3    CDAI: 15    Severe widespread allodynia  -Shoulder internal rotation to only 15 degrees, shoulder extension to 30 degrees bilaterally  #Bilateral tenderness to palpation in the hip bursa    ?  Labs:  Lab Results   Component Value Date    WBC 11.2 (H) 04/21/2024    RBC 3.66 (L) 04/21/2024    HGB 9.0 (L) 04/21/2024    HCT 31.0 (L) 04/21/2024    .0 04/21/2024    MCV 84.7 04/21/2024    MCH 24.6 (L) 04/21/2024    MCHC 29.0 (L) 04/21/2024    RDW 13.5 04/21/2024    NEPRELIM 6.30 04/21/2024    NEPERCENT 56.4 04/21/2024    LYPERCENT 33.5 04/21/2024    MOPERCENT 6.0 04/21/2024    EOPERCENT 2.2 04/21/2024    BAPERCENT 0.8 04/21/2024    NE 6.30 04/21/2024    LYMABS 3.75 04/21/2024    MOABSO 0.67 04/21/2024    EOABSO 0.25 04/21/2024    BAABSO 0.09 04/21/2024     Lab Results   Component Value Date    GLU 92 04/21/2024    BUN 8 (L) 04/21/2024    BUNCREA 15.6 06/09/2021    CREATSERUM 0.72 04/21/2024    ANIONGAP 8 04/21/2024    GFRNAA 120 09/20/2021    GFRAA 139 09/20/2021    CA 9.2 04/21/2024    OSMOCALC 280 04/21/2024    ALKPHO 71 04/21/2024    AST 30 04/21/2024    ALT 37 04/21/2024    BILT 0.4 04/21/2024    TP 8.0 04/21/2024    ALB 3.4 04/21/2024    GLOBULIN 4.6 (H) 04/21/2024     04/21/2024    K 4.2 04/21/2024     04/21/2024    CO2 21.0 04/21/2024 6/2023  Sed rate 39  CRP 1.08  CBC W differential WNL  Creatinine 2.59, AST 90,   A1c  7.4    3/2023  CBC W differential WNL  Creatinine 0.64, AST 71, ,     11/20/2022  CK 58  Vitamin B12 675  Vitamin D 13.3  TSH 2.240  Ferritin 103.1  G6PD 14.9  HLA-B27 negative  ANCA, MPO/CA-3 negative  RADHIKA is negative  Uric acid 3.9  CCP, RF negative  IGRA is negative  Sed rate 15  CRP 0.91  CBC normal.       9/2022:  Comment: Positive:  Presence of C. trachomatis (by MICHAEL)   Chlamydia trachomatis RNA detected by PCR.    HIV, hepatitis B surface antigen, RPR negative    7/2022  WBC 12.9, hemoglobin 14.3, platelets 363  Creatinine 0.76, ALT 54, rest of CMP normal    Additional Labs:    Radiology:    Radiology review:      =====================================================================================================  Assessment and Plan    Assessment:  1. Fibromyalgia    2. Apnea    3. Snoring    4. Seronegative rheumatoid arthritis (HCC)    5. Greater trochanteric bursitis of both hips    6. Other diabetic neurological complication associated with type 2 diabetes mellitus (HCC)    7. Chronic musculoskeletal pain    8. Therapeutic drug monitoring        #Fibromyalgia:  --Previously on amitriptyline with some subjective benefit though there is some issue with tolerance  --Active today  --Snoring and apneas noted.  PSG not obtained yet.    #Bilateral, left greater than right greater trochanteric bursitis    #suspect seronegative RA:   -Prior medications: Methotrexate (AST/ALT elevation)  -Previously on adalimumab + hydroxychloroquine with possible improvements in joint pain and tenderness though no significant proven in joint effusions.    #Menorrhagia, recent endometritis s/p IV antibiotics  -Likely due to recent anemia that was appreciated.  -Upcoming gynecologic surgery    #Diabetes complicated by diabetic neuropathy: Primarily of the lower extremities.  -- Suspect active in the upper extremities as well  #Bilateral carpal tunnel syndrome: improving overall with bracing  --11/2023 EMG/NCS with  mild/moderate carpal tunnel syndrome    High risk medication labs including CMP and CBC w/ diff reviewed from 4/2024. LFTs from 9/13/2023. Elevated AST/ALT persistently noted. noted.  9/2022: HIV, hep C, hepatitis B surface antigen is negative  11/2022: Hepatitis B core antibody total, IGRA negative    Plan:    Start cymbalta 30 mg daily x 2 weeks, then 60 mg daily. I prescribed it to your pharmacy. See the reading below. Sometimes the medication will give you more energy to complete acts, including suicide, in the first month, this risk goes down after the first month. Be very careful of this. If you have these thoughts, then stop the medication.     Repeat blood work in 3-4 months include vitamin D given it was 7 not too long go.    Schedule sleep study and ultrasound of the hands to evaluate for active rheumatoid arthritis; do this ultrasound before restarting Humira and hydroxychloroquine.    Do physical therapy for greater trochanter bursitis and fibromyalgia.  HEP for [employer] orthopedic surgery given to the patient as well.    Do chair yoga, bike/elliptical    Easy TaiChi - join in - a 9-minute Daily Practice:   https://www.Immusoftube.com/watch?v=ZxcNBejxlzs    Silas Chi 5 Minutes a Day Module 01 - easy for beginners  https://www.youSamasourceube.com/watch?v=oLKC9wfaFb4        Diagnoses and all orders for this visit:    Fibromyalgia  -     DIAGOSTIC SLEEP STUDY  -     General sleep study; Future  -     US HAND BILATERAL COMPLETE (CPT=76881); Future  -     Physical Therapy Referral - External  -     DULoxetine (CYMBALTA) 30 MG Oral Cap DR Particles; Take 1 capsule (30 mg total) by mouth daily for 15 days, THEN 2 capsules (60 mg total) daily.  -     Comp Metabolic Panel (14); Future  -     CBC With Differential With Platelet; Future  -     Vitamin D; Future    Apnea  -     DIAGOSTIC SLEEP STUDY  -     General sleep study; Future    Snoring  -     DIAGOSTIC SLEEP STUDY  -     General sleep study;  Future    Seronegative rheumatoid arthritis (HCC)  -     DIAGOSTIC SLEEP STUDY  -     General sleep study; Future  -     US HAND BILATERAL COMPLETE (CPT=76881); Future  -     Physical Therapy Referral - External  -     DULoxetine (CYMBALTA) 30 MG Oral Cap DR Particles; Take 1 capsule (30 mg total) by mouth daily for 15 days, THEN 2 capsules (60 mg total) daily.  -     Comp Metabolic Panel (14); Future  -     CBC With Differential With Platelet; Future  -     Vitamin D; Future    Greater trochanteric bursitis of both hips  -     Physical Therapy Referral - External    Other diabetic neurological complication associated with type 2 diabetes mellitus (HCC)    Chronic musculoskeletal pain  -     DIAGOSTIC SLEEP STUDY  -     General sleep study; Future  -     US HAND BILATERAL COMPLETE (CPT=76881); Future  -     Physical Therapy Referral - External  -     DULoxetine (CYMBALTA) 30 MG Oral Cap DR Particles; Take 1 capsule (30 mg total) by mouth daily for 15 days, THEN 2 capsules (60 mg total) daily.  -     Comp Metabolic Panel (14); Future  -     CBC With Differential With Platelet; Future  -     Vitamin D; Future    Therapeutic drug monitoring                Return in about 6 months (around 2/28/2025).      The above plan of care, diagnosis, orders, and follow-up were discussed with the patient. Questions related to this recommended plan of care were answered.    Thank you for referring this delightful patient to me. Please feel free to contact me with any questions.     This report was performed utilizing speech recognition software technology. Despite proofreading, speech recognition errors could escape detection. If a word or phrase is confusing or out of context, please do not hesitate to call for   clarification.       Kind regards      Trenton Fisher MD  [facility]

## 2024-08-30 NOTE — PATIENT INSTRUCTIONS
Start cymbalta 30 mg daily x 2 weeks, then 60 mg daily. I prescribed it to your pharmacy. See the reading below. Sometimes the medication will give you more energy to complete acts, including suicide, in the first month, this risk goes down after the first month. Be very careful of this. If you have these thoughts, then stop the medication.     Repeat blood work in 3-4 months     Schedule sleep study and ultrasound of the hands to evaluate for active rheumatoid arthritis    Do physical for greater trochanter bursitis and fibromyalgia     Hold off on Humira and hydroxychloroquine (plaquenil)     Do chair yoga, bike/elliptical    Easy TaiChi - join in - a 9-minute Daily Practice:   https://www.youtube.com/watch?v=ZxcNBejxlzs    Silas Chi 5 Minutes a Day Module 01 - easy for beginners  https://www.youCampEasyube.com/watch?v=eDQB1omsTk4

## 2024-08-30 NOTE — PROGRESS NOTES
Pt. requested a letter from Dr. Fisher. A letter was drafted and sent to Dr. Fisher for review and signature.

## 2024-09-03 ENCOUNTER — TELEPHONE (OUTPATIENT)
Facility: CLINIC | Age: 39
End: 2024-09-03

## 2024-09-03 NOTE — TELEPHONE ENCOUNTER
9/3/24-Received via fax from [facility] a PA for Dexcom G7 sensor.  Placed in PA in basket.

## 2024-09-03 NOTE — TELEPHONE ENCOUNTER
PA in CMM    KEY: [id number]    Questions answered and sent to plan- will await determination. If you have any questions about your PA submission, contact [employer] at [phone].    Approved today by [facility] 2017  Your request was approved based on the initial information provided at the time of the coverage request submission. Please allow additional time for the final decision to be made and added to the patient's account.

## 2024-09-04 ENCOUNTER — MED REC SCAN ONLY (OUTPATIENT)
Facility: CLINIC | Age: 39
End: 2024-09-04

## 2024-09-04 NOTE — TELEPHONE ENCOUNTER
9/4/24-Received via fax from [employer] approval for Dexcom G7 Sensor effective 6/5/24-9/3/25.  Sent to scan.

## 2024-09-05 ENCOUNTER — TELEPHONE (OUTPATIENT)
Facility: CLINIC | Age: 39
End: 2024-09-05

## 2024-09-05 NOTE — TELEPHONE ENCOUNTER
1 week checkin post appointment:    Tandem Tslim Control IQ + U500 + Dexcom G7: Settings adjusted:  Time Basal ICR ISF Active Insulin Time Target   MN 0.80 u/hr 5 20 --> 22 5h (default) 110   2A 0.450 u/hr 5 20       4:30A 0.350 u/hr 5 20       7A 1.0 u/hr --> 1.5u/hr 5 15 --> 17                    TDD 92u/day    Consistently doesn't get enough carb coverage throughout the day leading to prolonged hyperglycemia. Frequent overnight lows around 2/3a still. I suspect she is under-basalized during the day which leads to frequent control IQ boluses then eventual hypoglycemia-- to keep things more steady I am going to continue to cautiously uptitrate her basals as noted above and lighten her ISF during the day and also after MN    Suspect she'll need another adjustment next week    Reminder set to check in again in 1 week

## 2024-09-06 NOTE — TELEPHONE ENCOUNTER
Spoke with patient and gave updated settings per APN for pump with u500.  [facility]

## 2024-09-12 ENCOUNTER — NURSE ONLY (OUTPATIENT)
Facility: CLINIC | Age: 39
End: 2024-09-12
Payer: MEDICAID

## 2024-09-12 DIAGNOSIS — E11.65 TYPE 2 DIABETES MELLITUS WITH HYPERGLYCEMIA, WITH LONG-TERM CURRENT USE OF INSULIN (HCC): Primary | ICD-10-CM

## 2024-09-12 DIAGNOSIS — Z79.4 TYPE 2 DIABETES MELLITUS WITH HYPERGLYCEMIA, WITH LONG-TERM CURRENT USE OF INSULIN (HCC): Primary | ICD-10-CM

## 2024-09-12 PROCEDURE — 99211 OFF/OP EST MAY X REQ PHY/QHP: CPT

## 2024-09-12 NOTE — PROGRESS NOTES
Appreciate recommendations by diabetes educator, reviewed and agree-- we spoke on settings today.   Next week we will check in again and if still going low, will re-adjust the basal rates further to accommodate for U500 duration of action.    THEO Robledo  9/12/2024

## 2024-09-12 NOTE — PROGRESS NOTES
Pump Settings Adjustment Evaluation - Tandem    Keke Dumont  5/30/1985    Referred by: THEO Robledo       Medical Background      Past Medical History:    Arthritis    Asthma (HCC)    Diabetes (HCC)    Diabetes mellitus (HCC)        Patient has T2DM, seen today regarding insulin pump titration     Medication Review      DM Meds: insulin regular human (conc) Sopn - 500 UNIT/MLMeds : Adjusted       Patient Current Pump Settings:   Tandem Tslim Control IQ + U500 + Dexcom G7:   Time Basal ICR ISF Active Insulin Time Target   MN 0.80 u/hr 5 22 5h (default) 110   2A 0.450 u/hr 5 20       4:30A 0.350 u/hr 5 20       7A 1.5u/hr 5 17              Pump Data Review          Interpretation of Data:  Lows increased since adjustments made on 9/5, having low blood glucose every AM due to ISF overnight    Patient Concerns      - lows in the AM, tends to sleep through the low alarm --> treats with OJ and sandwich       Recommendations / Plan      Tandem Tslim Control IQ + U500 + Dexcom G7:   Time Basal ICR ISF Active Insulin Time Target   MN 0.80 u/hr --> 0.70  5 22 5h (default) 110   2A 0.40u/hr 5 20       7A 1.5u/hr 5 --> 4.5 17       7 P 1.5u/hr 4.5  22       ** get rid of 4:30 A **    Future Appointments   Date Time Provider Department Center   9/12/2024 12:15 PM [facility] DIABETIC EDUCATOR [facility]O [facility] [place]   11/25/2024  8:30 AM Caprice Black APN [facility] [place]   2/28/2025  8:45 AM Trenton Fisher MD [facility] [place]        9/12/2024  Vidhya Hooks RN, MSN, BC-ADM, [facility]  Diabetes Care &        A total of 10 minutes was spent with the patient including chart review, discussion and education / advisement pertinent to patient and provider specified concerns as documented above.     Note to patient: The 21 Century Cures Act makes medical notes like these available to patients in the interest of transparency. However, be advised this is a medical document. It is intended as peer to  peer communication. It is written in medical language and may contain abbreviations or verbiage that are unfamiliar. It may appear blunt or direct. Medical documents are intended to carry relevant information, facts as evident, and the clinical opinion of the practitioner.

## 2024-09-19 ENCOUNTER — NURSE ONLY (OUTPATIENT)
Facility: CLINIC | Age: 39
End: 2024-09-19
Payer: MEDICAID

## 2024-09-19 DIAGNOSIS — E11.65 TYPE 2 DIABETES MELLITUS WITH HYPERGLYCEMIA, WITH LONG-TERM CURRENT USE OF INSULIN (HCC): Primary | ICD-10-CM

## 2024-09-19 DIAGNOSIS — Z79.4 TYPE 2 DIABETES MELLITUS WITH HYPERGLYCEMIA, WITH LONG-TERM CURRENT USE OF INSULIN (HCC): Primary | ICD-10-CM

## 2024-09-19 PROCEDURE — 99211 OFF/OP EST MAY X REQ PHY/QHP: CPT

## 2024-09-19 NOTE — PROGRESS NOTES
Pump Settings Adjustment Evaluation - Stefani Dumont  5/30/1985    Referred by: THEO Robledo       Medical Background      Past Medical History:    Arthritis    Asthma (HCC)    Diabetes (HCC)    Diabetes mellitus (HCC)        Patient has T2DM, seen today regarding pump titration    Medication Review      DM Meds: insulin regular human (conc) Sopn - 500 UNIT/MLMeds : Adjusted       Patient Current Pump Settings:   Tandem Tslim Control IQ + U500 + Dexcom G7:   Time Basal ICR ISF Active Insulin Time Target   MN 0.70 u/hr 5 22 5h (default) 110   2A 0.40u/hr 5 20       7A 1.5u/hr 4.5 17       7 P 1.5u/hr 4.5  22             Pump Data Review          Interpretation of Data:  TIR marginally improved with adjustments, patient still experiencing consistent lows in the early morning:          Patient Concerns      - Having surgery on 10/15, will need to fast the day prior, unsure of what to do with insulin pump during surgery      Recommendations / Plan      Tandem Tslim Control IQ + U500 + Dexcom G7:   Time Basal ICR ISF Active Insulin Time Target   MN 0.70 u/hr 5 22 --> 26 5h (default) 110   2A 0.40u/hr --> 0.60 5 20        7A 1.5u/hr 4.5 17       7 P 1.5u/hr 4.5  22 --> 26           Future Appointments   Date Time Provider Department Center   9/19/2024 12:15 PM [facility] DIABETIC EDUCATOR [facility] [place]   11/25/2024  8:30 AM Caprice Black APN [facility] [place]   2/28/2025  8:45 AM Trenton Fisher MD [facility] [place]        9/19/2024  Vidhya Hooks RN, MSN, BC-ADM, [facility]  Diabetes Care &        A total of 10 minutes was spent with the patient including chart review, discussion and education / advisement pertinent to patient and provider specified concerns as documented above.     Note to patient: The 21 Century Cures Act makes medical notes like these available to patients in the interest of transparency. However, be advised this is a medical document. It is intended as  peer to peer communication. It is written in medical language and may contain abbreviations or verbiage that are unfamiliar. It may appear blunt or direct. Medical documents are intended to carry relevant information, facts as evident, and the clinical opinion of the practitioner.

## 2024-09-25 ENCOUNTER — NURSE ONLY (OUTPATIENT)
Facility: CLINIC | Age: 39
End: 2024-09-25
Payer: MEDICAID

## 2024-09-25 DIAGNOSIS — Z79.4 TYPE 2 DIABETES MELLITUS WITH HYPERGLYCEMIA, WITH LONG-TERM CURRENT USE OF INSULIN (HCC): Primary | ICD-10-CM

## 2024-09-25 DIAGNOSIS — E11.65 TYPE 2 DIABETES MELLITUS WITH HYPERGLYCEMIA, WITH LONG-TERM CURRENT USE OF INSULIN (HCC): Primary | ICD-10-CM

## 2024-09-25 PROCEDURE — 99211 OFF/OP EST MAY X REQ PHY/QHP: CPT

## 2024-09-25 NOTE — PROGRESS NOTES
Pump Settings Adjustment Evaluation - Tandem w/ u500    Keke Dumont  5/30/1985    Referred by: THEO Robledo       Medical Background      Past Medical History:    Arthritis    Asthma (HCC)    Diabetes (HCC)    Diabetes mellitus (HCC)        Patient has T2DM, seen today regarding pump settings    Medication Review      DM Meds: insulin regular human (conc) Sopn - 500 UNIT/MLMeds : Adjusted       Patient Current Pump Settings:   Tandem Tslim Control IQ + U500 + Dexcom G7:   Time Basal ICR ISF Active Insulin Time Target   MN 0.70 u/hr 5 26 5h (default) 110   2A 0.60 u/hr 5 20        7A 1.5u/hr 4.5 17       7 P 1.5u/hr 4.5  26              Pump Data Review            Interpretation of Data:  Lows in the early AM mostly resolved with hyperglycemia persisting in the afternoons    Patient Concerns      - Highs in the afternoons       Recommendations / Plan      Per THEO Robledo -    Tandem Tslim Control IQ + U500 + Dexcom G7:   Time Basal ICR ISF Active Insulin Time Target   MN 1.20 u/hr 3.5 30 5h (default) 110   8 A 2.0 u/hr  3.5 16       10 P 2.0u/hr 3.5 30         - Surgery instructions: ok to disconnect pump prior to surgery and replace once awake after surgery.  Attempt a trial pump removal as may need to remove pump 1-2 hours prior to surgery due to u500 insulin acting time      Future Appointments   Date Time Provider Department Center   9/25/2024 12:15 PM [facility] DIABETIC EDUCATOR [facility] [place]   11/25/2024  8:30 AM Caprice Black APN [facility] [place]   2/28/2025  8:45 AM Trenton Fisher MD [facility] [place]        9/25/2024  Vidhya Hooks RN, MSN, BC-ADM, [facility]  Diabetes Care &        A total of 15 minutes was spent with the patient including chart review, discussion and education / advisement pertinent to patient and provider specified concerns as documented above.     Note to patient: The 21 Century Cures Act makes medical notes like these available to  patients in the interest of transparency. However, be advised this is a medical document. It is intended as peer to peer communication. It is written in medical language and may contain abbreviations or verbiage that are unfamiliar. It may appear blunt or direct. Medical documents are intended to carry relevant information, facts as evident, and the clinical opinion of the practitioner.

## 2024-10-21 DIAGNOSIS — Z79.4 TYPE 2 DIABETES MELLITUS WITH HYPERGLYCEMIA, WITH LONG-TERM CURRENT USE OF INSULIN (HCC): Primary | ICD-10-CM

## 2024-10-21 DIAGNOSIS — E11.65 TYPE 2 DIABETES MELLITUS WITH HYPERGLYCEMIA, WITH LONG-TERM CURRENT USE OF INSULIN (HCC): Primary | ICD-10-CM

## 2024-10-21 RX ORDER — INSULIN ASPART 100 [IU]/ML
INJECTION, SOLUTION INTRAVENOUS; SUBCUTANEOUS
Qty: 45 ML | Refills: 1 | Status: SHIPPED | OUTPATIENT
Start: 2024-10-21

## 2024-10-21 RX ORDER — PEN NEEDLE, DIABETIC 32GX 5/32"
1 NEEDLE, DISPOSABLE MISCELLANEOUS 4 TIMES DAILY
Qty: 200 EACH | Refills: 1 | Status: SHIPPED | OUTPATIENT
Start: 2024-10-21 | End: 2025-10-21

## 2024-10-21 RX ORDER — INSULIN GLARGINE 300 U/ML
INJECTION, SOLUTION SUBCUTANEOUS
Qty: 27 ML | Refills: 0 | Status: SHIPPED | OUTPATIENT
Start: 2024-10-21 | End: 2024-10-23

## 2024-10-21 NOTE — TELEPHONE ENCOUNTER
RN phoned patient to discuss- patient tried to refill U500 10/17- has been out since yesterday    States she did not eat dinner due to not having insulin.    Dexcom report showing 120, but patient was high all night    Symptoms: Head ache and nausea. Denies vomiting  Reviewed note from [facility]: if patient has prolonged head ache with nausea or has vomiting/ abdominal pain, she should be seen in the ER    Patient does not have ketone strips at home, pended RX.    She was able to get one pen and is loading her pump now.     Patient would like back up plan if U500 out of stock    Dexcom report in [address] for review.    Routed for review.

## 2024-10-21 NOTE — TELEPHONE ENCOUNTER
pump backup plan: adjusted to account for if U500 unable to be ordered / U100 instead of U200 as U200 may also require PA/delay care:  Toujeo U300 - take 150u once daily in the event of pump failure.   Take novolog 50u three times daily before meals.     Ideally she is able to get U500 from the pharmacy for next refill on pump.     Last office visit note updated w/ backup plan.  Insulin / pen needles ordered to pharmacy. Staff to update patient, then can close encounter

## 2024-10-21 NOTE — TELEPHONE ENCOUNTER
10/21/2024 - called in office confirmed name and . Keke stated she put her refill request as of 10/17/2024 the pharmacy might have it ready tomorrow 10/18/2024, but she ran out of insulin as of yesterday and she does not know what to do. She said that her pharmacy looked around but do to the high quality demands they are low in stock or have to order them due to being out.

## 2024-10-22 ENCOUNTER — TELEPHONE (OUTPATIENT)
Facility: CLINIC | Age: 39
End: 2024-10-22

## 2024-10-22 NOTE — TELEPHONE ENCOUNTER
10/22/24-Received via fax from [facility] a PA for B-D [place] 2nd Gen Pen Needle.  Placed in PA in basket.

## 2024-10-22 NOTE — TELEPHONE ENCOUNTER
10/22/24-Received via fax from [facility] a Drug change request for Insulin Aspart Flexpen as plan does not cover this medication.  Alternative medications Humulin/Humalog products.  Placed in PA in basket.

## 2024-10-22 NOTE — TELEPHONE ENCOUNTER
RN phoned pharmacy and gave verbal to David, Pharmacist, to change Novolog to Humalog.    States this went through insurance for no charge.    Also reviewed for additional TE- BD pen needles was processed as Truemetrix, and patient picked these up.    States she got a partial fill elsewhere, and now they are processing the rest of the U500 refill for tomorrow.    For Kit, states that this will need a PA and Lantus is preferred.     PA in CMM     KEY: [id number]    Questions answered and sent to plan- awaiting determination. If you have any questions about your PA submission, contact [employer] at [phone].

## 2024-10-23 RX ORDER — INSULIN HUMAN 500 [IU]/ML
INJECTION, SOLUTION SUBCUTANEOUS
Qty: 40 ML | Refills: 2 | Status: SHIPPED | OUTPATIENT
Start: 2024-10-23 | End: 2024-10-23

## 2024-10-23 RX ORDER — INSULIN GLARGINE 300 U/ML
INJECTION, SOLUTION SUBCUTANEOUS
Qty: 30 ML | Refills: 1 | Status: SHIPPED | OUTPATIENT
Start: 2024-10-23

## 2024-10-23 NOTE — TELEPHONE ENCOUNTER
Reason for denial: \"You must have tried and had a poor response to two preferred d rugs that are used to treat your condition. They must be appropriate for your condition. They must have been tried for at least 2 weeks. You have tried zero. Please note, the preferred drugs are Lantus (solostar pen, vial), Levemir (flextouch Pen, vial).     Routing to provider for review.    Composite Graft Text: The defect edges were debeveled with a #15 scalpel blade.  Given the location of the defect, shape of the defect, the proximity to free margins and the fact the defect was full thickness a composite graft was deemed most appropriate.  The defect was outline and then transferred to the donor site.  A full thickness graft was then excised from the donor site. The graft was then placed in the primary defect, oriented appropriately and then sutured into place.  The secondary defect was then repaired using a primary closure.

## 2024-10-23 NOTE — TELEPHONE ENCOUNTER
10/23/24-Received via fax from [employer] a denial of coverage for Insulin Glargine Max Solostar.  Placed in PA in basket.

## 2024-10-23 NOTE — TELEPHONE ENCOUNTER
Patient calling RN stating she has 20 units left of U-500 insulin and pharmacy is telling her that per insurance, patient can't refill prescription until 10/26/24.    RN called [facility]; spoke with pharmacist, David SCHOFIELD. Per David,  On 7/24/24- 45 ml U-500 dispensed to patient   On 9/9/24- 45 ml U- 500 dispensed to patient   On 10/21/24 a U-500 pen 500/ml 3ml pen was dispensed tp patient (1500 units total).     RN asked patient to look on her pump the \"history\" and tell RN the total daily dose. Patient states pump is reading a TDD of 125 units.     Message routed to Bisi.

## 2024-10-23 NOTE — TELEPHONE ENCOUNTER
10/23/2024 received respond from [facility] via fax fro TOUJEO MAX SOLOSTAR 300 UNIT/ML SOLUTION PEN-INJECTOR UP TO 18 MLS per 30 days was denied - placed in PA in basket.

## 2024-10-23 NOTE — TELEPHONE ENCOUNTER
Phoned patient and spoke with her given ongoing insurance sin re: insulin. We reviewed her backup options:  She states she used lantus in past, didn't work at all  Insurance will need a PA for toujeo.   She also cannot use insulin pens due to arthritic pain, cannot self inject, noone is home to help her    She can't use syringes    I offered other agents such as metformin, mounjaro as alternatives, she declined (cannot stomach any pills), doesn't want to try mounjaro due to nausea. She's seeing gastro tomorrow    Sent U500 insulin PEN (that's what [facility] pharmacist said they had in stock), with sig 650u/day. Sent 3mo fill.    Phoned patient and updated her on plan. States understanding. Reviewed should seek ER care if BG >350 and experiencing nausea/abd pain/vomiting.

## 2024-10-23 NOTE — TELEPHONE ENCOUNTER
I am going to cancel the toujeo ordered at 150u (not appropriate based on current TDD). Then, I will want to do a PA with my updated note and noted prev intolerances/levemir no longer being produced.

## 2024-10-24 NOTE — TELEPHONE ENCOUNTER
10/24/24-Received via fax from [employer] approval for Toujeo Max Solostar 300Unit/ml solution pen injector effective 7/26/24-10/24/25.  Sent to scan.

## 2024-10-24 NOTE — TELEPHONE ENCOUNTER
Phoned patient, did not receive a msg from her pharmacy yet    Phoned patient's pharmacy to get update

## 2024-10-24 NOTE — TELEPHONE ENCOUNTER
Connected with pharmacist-- he states based on her plan, her insurance will cover a certain amt of insulin for days - they will allow 30 days at the moment based on her plan.     I phoned the patient and let her know it should be ready for pickup by 9:30pm per the pharmacist, states understanding.  Gave pharmacy my phone number if any other problems arise

## 2024-10-25 ENCOUNTER — MED REC SCAN ONLY (OUTPATIENT)
Facility: CLINIC | Age: 39
End: 2024-10-25

## 2024-10-25 NOTE — TELEPHONE ENCOUNTER
See other note from TE from [facility]:  10/24/24-Received via fax from [employer] approval for Toujeo Max Solostar 300Unit/ml solution pen injector effective 7/26/24-10/24/25.  Sent to scan.          Closing this encounter.

## 2024-10-25 NOTE — TELEPHONE ENCOUNTER
Called and spoke with patient. States she was able to  the U500 for pump and was given a month supply.

## 2024-11-26 ENCOUNTER — PATIENT MESSAGE (OUTPATIENT)
Facility: CLINIC | Age: 39
End: 2024-11-26

## 2024-11-26 NOTE — TELEPHONE ENCOUNTER
Tandem report reviewed shows trend of evening highs followed by AM lows.  Patient not waking to alarms.         Current Settings with u500:        Appears patient may need increase in ICR in the evenings and reduction in basal overnight, e.g.  Basal adjustments: 10 p --> 1.90, 12 A --> 1.00  ICR adjustments: 8 A --> 2, 10 P --> 2     Routed to provider for review

## 2024-11-27 NOTE — TELEPHONE ENCOUNTER
I think the problem is the MN/1AM control IQ boluses she gets are leading to the 5A lows. I reviewed old notes-- we had turned off her sleep mode when she was still on much gentler settings. Let's try turning back on sleep mode starting at 11PM - 6AM. I am also going to then intensify her overnight basal with the goal of more consistency - I think her rebound highs during the day are from the pausing of insulin at 5am --> causing hyperglycemia ~6 hours later around noon or 1, when the peak of U500 should be. Then control IQ kicks in and brings her down 5-6 hours later and we are in a cycle     I am also going to try lightening the ISF midday as some of her control IQ boluses were bringing her down (again 4-5 hours later, due to peak of U500). She should also try her best to bolus at least 30 min before the meal.     Tandem Tslim + Control IQ + U500    Time Basal ICR ISF Active Insulin Time Target   MN  1.20  3.5 30 5h 110   6 AM (change from 8AM) 2.0 u/hr  16 --> 18       10P 2.0 u/hr  30       -Turn on sleep mode again, from 11p - 6AM   -enter carbs 30 min before meals     - plan to check in again on Friday

## 2024-11-29 NOTE — TELEPHONE ENCOUNTER
Tandem Report showing:        Reviewed with provider.  Patient continues to experience hyperglycemia after meals following by a hypoglycemic event in the mid afternoon and early AM.      Adjustments discussed with provider sent to patient via [facility].  Attempted phone call to discuss and add on earlier follow up, Left voice mail.

## 2024-12-06 ENCOUNTER — TELEMEDICINE (OUTPATIENT)
Facility: CLINIC | Age: 39
End: 2024-12-06
Payer: MEDICAID

## 2024-12-06 DIAGNOSIS — Z46.81 INSULIN PUMP TITRATION: ICD-10-CM

## 2024-12-06 DIAGNOSIS — Z79.4 TYPE 2 DIABETES MELLITUS WITH HYPERGLYCEMIA, WITH LONG-TERM CURRENT USE OF INSULIN (HCC): Primary | ICD-10-CM

## 2024-12-06 DIAGNOSIS — E11.65 TYPE 2 DIABETES MELLITUS WITH HYPERGLYCEMIA, WITH LONG-TERM CURRENT USE OF INSULIN (HCC): Primary | ICD-10-CM

## 2024-12-06 DIAGNOSIS — L70.9 ACNE, UNSPECIFIED ACNE TYPE: ICD-10-CM

## 2024-12-06 DIAGNOSIS — N91.5 OLIGOMENORRHEA, UNSPECIFIED TYPE: ICD-10-CM

## 2024-12-06 PROCEDURE — 95251 CONT GLUC MNTR ANALYSIS I&R: CPT

## 2024-12-06 PROCEDURE — 99215 OFFICE O/P EST HI 40 MIN: CPT

## 2024-12-06 RX ORDER — INSULIN PMP CART,AUT,G6/7,CNTR
1 EACH SUBCUTANEOUS
Qty: 10 EACH | Refills: 6 | Status: SHIPPED | OUTPATIENT
Start: 2024-12-06

## 2024-12-06 RX ORDER — TIRZEPATIDE 2.5 MG/.5ML
2.5 INJECTION, SOLUTION SUBCUTANEOUS WEEKLY
Qty: 2 ML | Refills: 0 | Status: SHIPPED | OUTPATIENT
Start: 2024-12-06

## 2024-12-06 RX ORDER — INSULIN PMP CART,AUT,G6/7,CNTR
1 EACH SUBCUTANEOUS AS DIRECTED
Qty: 1 KIT | Refills: 0 | Status: CANCELLED | OUTPATIENT
Start: 2024-12-06

## 2024-12-06 NOTE — PROGRESS NOTES
[facility] Note - Telemedicine    Keke Dumont verbally consents to a Telemedicine (Audio + Video) visit on 12/6/24. The visit was conducted in a private room.       HISTORY OF PRESENT ILLNESS   Keke Dumont is a 39 year old female with PMHx significant for UC, RA, DM2 who presents for  DM mgmt.    Initial HPI consult in March 2024  Previously with DM center [facility], LOV 9/2023  Referred to endo for inadequate glycemic control    Diabetes history:  Type: 2 (antibodies negative)  Onset: 2016  Pt has not been admitted to the hospital for blood sugars.   Pt does not have a hx of pancreatitis.   Started on insulin in ~2022  Has been on Tandem insulin pump for about a year     Current DM regimen:  Humalog U-500 via T-slim with control IQ    Previous DM therapies:  Victoza,Trulicity - GI upset and nausea  Metformin - significant stomach cramping  Glipizide - significant cramping  MDI     Pt's had some low's since her LOV with KERAN and her latest Tandem pump settings as adjusted  are as follows: uses U-500  Has fibromyalgia and RA; uses cane to ambulate longer distances     Previously trialed/failed:   Victoza- severe nausea,Trulicity- stomach cramping   Metformin - significant stomach cramping  Glipizide - significant cramping    Interim hx:  12/6/2024-ally/ Bisi VENEGAS.Last A1c value was 7.2% done 7/19/2024.  Video visit.  Now living back at home in [place].  Last office visit, titrated settings on her pump. Still persists with overnight hypoglycemia on tandem.  Is open to switching to OmniPod.  Very tech savvy.  Wants to use up G7 supply.  Also open to trialing Mounjaro, saw GI, and they diagnosed her with IBS constipation, advised her to increase fiber intake.  Bowel movements are a bit better at this point video visit.    Re: menses- Had a D&C planned for Oct, had to postpone due to viral illness, now she wants to defer surgery   Re: acne- noting a lot of hormonal acne on her her chin and  cheeks    DM meds at visit: Tandem Tslim + Control IQ + U500                 Time Basal ICR ISF Active Insulin Time Target   MN  1.20  3.5  30 5h 110   6 AM   2.0 u/hr   18        10P 2.0 u/hr   30       TDD 96u/day (480u/day in U100 language)   - sleep mode 11p - 6AM       Continuous Glucose Monitoring Interpretation  Keke Dumont has undergone continuous glucose monitoring with their CGM.  The blood glucose tracings were evaluated for two weeks prior to office visit.   Blood glucose tracings demonstrated areas of hyperglycemia post-meal, particularly post-lunch and dinner which is 6-8 hours after her pump has suspended all insulin due to low BG trends  There were  a consistent pattern of hypoglycemia occurring in the early morning hours around 3A-4A .            Objective:      REVIEW OF SYSTEMS  Ten point review of systems has been performed and is otherwise negative and/or non-contributory, except as described above.     Medications:     Current Outpatient Medications:     Tirzepatide (MOUNJARO) 2.5 MG/0.5ML Subcutaneous Solution Auto-injector, Inject 2.5 mg into the skin once a week., Disp: 2 mL, Rfl: 0    Insulin Disposable Pump (OMNIPOD 5 [id number] PODS GEN 5) Does not apply Misc, 1 Device every 3 (three) days., Disp: 10 each, Rfl: 6    insulin regular human, conc, 500 UNIT/ML Subcutaneous Solution Pen-injector, Use to fill insulin pump with up to 650u per day., Disp: 120 mL, Rfl: 1    Insulin Glargine, 2 Unit Dial, (TOUJEO MAX SOLOSTAR) 300 UNIT/ML Subcutaneous Solution Pen-injector, Use in the event of pump failure- Inject 300 Units into the skin daily., Disp: 30 mL, Rfl: 1    Urine Glucose-Ketones Test In Vitro Strip, Use as needed to test for ketones., Disp: 100 strip, Rfl: 1    insulin aspart (NOVOLOG FLEXPEN) 100 Units/mL Subcutaneous Solution Pen-injector, Use in the event of pump failure. Take 50u three times daily before meals, Disp: 45 mL, Rfl: 1    Insulin Pen Needle (BD PEN NEEDLE GAVINO U/F) 32G X 4  MM Does not apply Misc, Inject 1 Pen into the skin in the morning, at noon, in the evening, and at bedtime., Disp: 200 each, Rfl: 1    Mometasone Furo-Formoterol Fum (DULERA) 200-5 MCG/ACT Inhalation Aerosol, Inhale 2 puffs into the lungs 2 (two) times daily., Disp: , Rfl:     fluticasone propionate 50 MCG/ACT Nasal Suspension, 1 spray by Nasal route 2 (two) times daily., Disp: , Rfl:     albuterol 108 (90 Base) MCG/ACT Inhalation Aero Soln, Inhale 2 puffs into the lungs every 4 (four) hours as needed for Shortness of Breath., Disp: , Rfl:     Continuous Glucose Sensor (DEXCOM G7 SENSOR) Does not apply Misc, 1 each Every 10 days. (Patient not taking: Reported on 2024), Disp: 9 each, Rfl: 1    Glucose Blood (ONETOUCH VERIO) In Vitro Strip, Use to test once daily as needed to calibrate dexcom. (Patient not taking: Reported on 2024), Disp: 90 strip, Rfl: 3    GVOKE HYPOPEN 1-PACK 1 MG/0.2ML Subcutaneous SUBQ injection, , Disp: , Rfl:     Blood Glucose Monitoring Suppl (ONETOUCH VERIO IQ SYSTEM) w/Device Does not apply Kit, Use as directed. (Patient not taking: Reported on 2024), Disp: 1 kit, Rfl: 0    Blood Glucose Monitoring Suppl (ONETOUCH VERIO FLEX SYSTEM) w/Device Does not apply Kit, 1 Device As Directed. (Patient not taking: Reported on 2024), Disp: , Rfl:     SUMAtriptan 50 MG Oral Tab, TAKE 1 TABLET BY MOUTH 1 TIME FOR UP TO 1 DOSE AS NEEDED FOR MIGRAINE, Disp: , Rfl:      Allergies:   Allergies   Allergen Reactions    Milk ANAPHYLAXIS    Penicillins HIVES    Aspirin RASH and NAUSEA AND VOMITING    Codeine NAUSEA AND VOMITING    Ibuprofen NAUSEA AND VOMITING       Social History:   Social History     Socioeconomic History    Marital status:    Tobacco Use    Smoking status: Former     Current packs/day: 0.00     Average packs/day: 0.5 packs/day for 15.0 years (7.5 ttl pk-yrs)     Types: Cigarettes     Start date: 2006     Quit date: 2021     Years since quittin.9     Smokeless tobacco: Never   Vaping Use    Vaping status: Never Used   Substance and Sexual Activity    Alcohol use: Not Currently    Drug use: Not Currently       Medical History:   Reviewed    Surgical history:   Past Surgical History:   Procedure Laterality Date    Other      torn ligament and tendon to left ankle.        PHYSICAL EXAM  Limited due to telemedicine encounter    Constitutional Not in acute distress  Pulmonary: no wheezing heard  No coughing on the phone. Speaking in full sentences   Neurological:  Alert and oriented to person, place and time.   Psychiatric: Normal affect, mood and behavior appropriate        Labs/Imaging: Reviewed.    Thyroid labs:  Recent Labs     11/20/22  1157 07/19/24  0847 08/19/24  1029   T4F  --  1.2 1.2   TSH 2.240 5.514* 2.704      DM Antibodies:   Per previous endo note:   LYLE-65 Antibody  <5 IU/mL <5  Insulin Antibodies  <0.4 U/mL <0.4         Component  Ref Range & Units 7/19/24  8:47 AM   ZNT8 Abs  U/mL <15            Assessment & Plan:     ICD-10-CM    1. Type 2 diabetes mellitus with hyperglycemia, with long-term current use of insulin ([facility])  E11.65 Tirzepatide (MOUNJARO) 2.5 MG/0.5ML Subcutaneous Solution Auto-injector    Z79.4 Insulin Disposable Pump (OMNIPOD 5 [id number] PODS GEN 5) Does not apply Misc     insulin regular human, conc, 500 UNIT/ML Subcutaneous Solution Pen-injector     GLUC MNTR CONT REC FROM NTRSTL TISS FLU PHYS I&R      2. Insulin pump titration  Z46.81       3. Oligomenorrhea, unspecified type  N91.5 Salivary Cortisol, MS ([facility])     Salivary Cortisol, MS ([facility])      4. Acne, unspecified acne type  L70.9           #Type 2 diabetes mellitus with hyperglycemia, with long-term current use of insulin ([facility])  (primary encounter diagnosis)  Plan:   Pleasant 39 year old female with PMHx Type 2 diabetes mellitus dx'd age 31- in 2016; CHARITY ab negative.  Last A1c value was 7.2% done 7/19/2024.  Goal <7%. She is currently using Tandem  control IQ with U-500. Has been on Tandem for 2 years.  She persistently has hypoglycemia episodes in the morning, followed by period of hyperglycemia, I think this has to do with the tandem algorithm not being compatible with U-500 insulin.  We discussed starting fresh with new settingss which might lead to more stability versus trialing OmniPod 5, patient would like to trial the OmniPod 5 algorithm w/ U500.  Will send OmniPod 5 starter kit, she plans on starting with the controller as she wants to continue her Dexcom G7.      She is also open to starting Mounjaro.  Will tentatively lighten her settings with the Mounjaro start.    - start mounjaro 2.5mg weekly x 4 weeks   - When you start mounjaro, lighten the ISF/ICR a bit (cautiously) :   Tandem Tslim + Control IQ + U500                 Time Basal ICR ISF Active Insulin Time Target   MN  1.20  3.5 --> 4 30 5h 110   6 AM   2.0 u/hr   18 --> 20        10P 2.0 u/hr   30       TDD 96u/day (480u/day in U100 language)   - sleep mode 11p - 6AM     Also switching to omnipod from Tandem:   - Switch over omnipod 5 + dexcom G7 (planing on using controller because just got a lot of G7's)  - when you pickup the pods call the office to schedule a omnipod training with Vidhya     - previously trialed and intolerance of GLP, ORTEZ, metformin; discussed actos for insulin sensitization, pt politely declined  - no strict CI for SGLT2i, previously discussed- she has frequent yeast infxn and doesn't want to try it out right now  - check BG 4x/day using glucometer or CGM  - Discussed adding GLP-1 to current medication regimen. Discussed action, risk vs benefit, dosing, and potential side effects. - Patient denies hx of pancreatitis, gastroparesis, or personal or family hx of medullary thyroid CA or MEN 2.   - patient is not pregnant or intending to become pregnant  - pump backup plan: Toujeo 200u/day, novolog 50u three times daily AC before meals or can use U500 60u/40u/40u before each  meal    Previously intolerant:   - Victoza- severe nausea  - Trulicity- stomach cramping  - rybelsus- stomach cramping  - has not tried Ozempic  - Lantus- highly ineffective at doses 95u twice daily per chart review  - levermir- no longer being manufactured    Nephropathy screening   To goal, neg for nephropathy  Lab Results   Component Value Date    EGFRCR 110 04/21/2024    MICROALBCREA 22.1 07/19/2024      Blood pressure control: SBP- unable to assess, video visit   BP Readings from Last 1 Encounters:   08/30/24 136/86   BP Meds:  none    #Hyperlipidemia  - LDL is not to goal   Lab Results   Component Value Date     (H) 07/19/2024    TRIG 149 07/19/2024   Statin CI due to childbearing age.        #Vitamin D deficiency  #Chronic fatigue  Plan:   Ongoing fatigue. Hasn't completed labs-- reminded  Reviewed that fatigue is often multifactorial and we have reviewed the common etiologies, both endocrine and non-endocrine.  Workup ordered last office visit, hasn't completed:   - repeat TFTs today  - no anemia per CBC   - vit D- deficient; now taking weekly replacement-- recheck in fall 2024  - vit B12 - (due for repeat)   - reviewed if all above normal, pt will continue to follow up with PCP      #Oligomennorhea  #Acne  Following with gyne for amenorrhea s/p IUD removal April 2024. Unable to see outside records. She has an upcoming visit w/ them Sept 2024. Given hx of acne, hirsutism, abnormal menses, insulin resistance on U500 insulin pump-- she also likely has PCOS; will obtain labs.  The differential diagnosis includes PCOS vs nonclassic CAH vs cortisol or prolactin or thyroid abnormalities, all of which may have similar overlapping features to PCOS including menstrual irregularities +/- hyperandrogenism  Workup ordered last office visit, hasn't completed:   -Lab ordered: check PRL, TSH, MN salivary cortisol, hcg, FSH/E2/T, 17OH progesterone, testosterone  - continue follow up with gyne    Return in about 2  months (around 2/6/2025) for diabetes follow up.  Prev Dr. Carroll patient, discussed will follow up with [facility] until Dr Carroll returns ~Jan 2025    A total of 42 minutes was spent today on obtaining history, reviewing pertinent labs, evaluating patient, providing multiple treatment options, reinforcing diet/exercise and compliance, and completing documentation.       THEO Robledo  12/6/24

## 2024-12-06 NOTE — PATIENT INSTRUCTIONS
Follow up plan:  With THEO Robledo: No follow-ups on file.    Plan:  - complete blood work, fasting  - complete midnight salivary cortisol testing: [facility] labs: Salivary swab  Please complete the salivary swabs, these can be picked up at any [place] lab location.  You will swab your cheek for saliva at bedtime, close to midnight, 2 nights in a row. The lab will give you reminder instructions on how to do this.  When you are done you will drop this off at any [place] lab location.    Medications:   - start mounjaro 2.5mg weekly x 4 weeks   - When you start mounjaro, lighten the ISF/ICR a bit (cautiously) on your pump. Please keep me posted if any hypoglycemia occurs:   Tandem Tslim + Control IQ + U500                 Time Basal ICR ISF Active Insulin Time Target   MN  1.20  3.5 --> 4 30 5h 110   6 AM   2.0 u/hr   18 --> 20        10P 2.0 u/hr   30       TDD 96u/day (480u/day in U100 language)   - sleep mode 11p - 6AM     Also switching to omnipod from Tandem:   - Switch over omnipod 5 + dexcom G7 (planing on using controller because just got a lot of G7's)  - when you pickup the pods call the office to schedule a omnipod training with Kee     - pump backup plan: Toujeo 200u/day, novolog 50u three times daily AC before meals    Probiotics:       Updated/current diabetes medication instructions:  Diabetic Medications               insulin regular human, conc, 500 UNIT/ML Subcutaneous Solution Pen-injector Use to fill insulin pump with up to 650u per day.    Insulin Glargine, 2 Unit Dial, (TOUJEO MAX SOLOSTAR) 300 UNIT/ML Subcutaneous Solution Pen-injector Use in the event of pump failure- Inject 300 Units into the skin daily.    insulin aspart (NOVOLOG FLEXPEN) 100 Units/mL Subcutaneous Solution Pen-injector Use in the event of pump failure. Take 50u three times daily before meals    GVOKE HYPOPEN 1-PACK 1 MG/0.2ML Subcutaneous SUBQ injection             Office phone number: [phone];  phones are open Monday-Friday 8:30-4:30.   Thank you for visiting our office. We look forward to working with you to reach your health goals. As a reminder, if you need refills, please request early so there is enough time to process the request. We ask that you provide at least 5 days' notice before a refill is due, so there is time to send a request to pharmacy, process the refill, and ensure there are no other problems with obtaining the medication (backorders, prior authorization paperwork, etc). Routine refills will not be addressed on weekends, so please submit these requests during the week.    Blood sugar targets:  Before breakfast: , 2 hours after meals: <180 (preferably <150), A1C goal: <7.0%  Time in Range goal is higher than 80% if using a continuous glucose monitor (Dexcom or Asa).  Time in Range can be found within the Dexcom G7 neeru, on Clarity if using Dexcom G6, or on LibreView if using Asa.    HOW TO TREAT LOW BLOOD SUGAR (Hypoglycemia)  Low blood sugar= Less than 70, or if you start to have symptoms (below)  Symptoms: Shaking or trembling, fast heart rate, extreme hunger, sweating, confusion/difficulty concentrating, dizziness.    How to treat a low blood sugar if you are able to eat/drink: The Rule of 15  If you are using continuous glucose monitor that says you are low, but you do not have any symptoms, verify on fingerstick that your blood sugar is actually low before treating.   Eat 15 grams of carbs (see examples below)  Check your blood sugar after 15 minutes. If it’s still below your target range, have another serving.   Repeat these steps until it’s in your target range. Once it’s in range, if you're nervous about your sugar going low again, have a protein source (ie, a spoonful of peanut butter).   If you have a CGM you want to look for how your arrow has changed. If you arrow is pointed up or sideways after 15 min, give your CGM more time OR check with a finger stick. Try not to  eat more food until at least 15 min after the first BG check - otherwise you risk having a rebound high.  If you are experiencing symptoms and you are unable to check your blood glucose for any reason, treat the hypoglycemia.  If someone has a low blood sugar and is unconscious: Don’t hesitate to call 911. If someone is unconscious and glucagon is not available or someone does not know how to use it, call 911 immediately.    To treat a low, I recommend you carry with you easy, pre-portioned treatment for low blood sugars that are 15G of carbs:   - Children sized squeeze pouch applesauce (high fiber + carbs help prevent too high of a spike)  - Small children's sized juicebox- 15g carb --> 4oz juice box  - Glucose tablets from [employer], you can find them near diabetes supplies --> Note, you will need to eat 3-4 tablets to get to 15g of carbs  - Children sized fruit snack pack- look for one with 15 grams of total carbohydrate  - Choice of how to treat your low is important. Complex carbs, or foods that contain fats along with carbs (like chocolate) can slow the absorption of glucose and should NOT be used to treat an emergency low

## 2024-12-08 ENCOUNTER — LAB ENCOUNTER (OUTPATIENT)
Dept: LAB | Facility: HOSPITAL | Age: 39
End: 2024-12-08
Attending: INTERNAL MEDICINE
Payer: MEDICAID

## 2024-12-08 ENCOUNTER — TELEPHONE (OUTPATIENT)
Dept: RHEUMATOLOGY | Facility: CLINIC | Age: 39
End: 2024-12-08

## 2024-12-08 ENCOUNTER — LAB ENCOUNTER (OUTPATIENT)
Dept: LAB | Facility: HOSPITAL | Age: 39
End: 2024-12-08
Payer: MEDICAID

## 2024-12-08 DIAGNOSIS — E55.9 VITAMIN D DEFICIENCY: ICD-10-CM

## 2024-12-08 DIAGNOSIS — M79.7 FIBROMYALGIA: ICD-10-CM

## 2024-12-08 DIAGNOSIS — E55.9 VITAMIN D DEFICIENCY: Primary | ICD-10-CM

## 2024-12-08 DIAGNOSIS — Z51.81 THERAPEUTIC DRUG MONITORING: ICD-10-CM

## 2024-12-08 DIAGNOSIS — M79.18 CHRONIC MUSCULOSKELETAL PAIN: ICD-10-CM

## 2024-12-08 DIAGNOSIS — N91.5 OLIGOMENORRHEA, UNSPECIFIED TYPE: ICD-10-CM

## 2024-12-08 DIAGNOSIS — M06.00 SERONEGATIVE RHEUMATOID ARTHRITIS (HCC): ICD-10-CM

## 2024-12-08 DIAGNOSIS — R79.89 LOW VITAMIN D LEVEL: Primary | ICD-10-CM

## 2024-12-08 DIAGNOSIS — G89.29 CHRONIC MUSCULOSKELETAL PAIN: ICD-10-CM

## 2024-12-08 LAB
ALBUMIN SERPL-MCNC: 4.3 G/DL (ref 3.2–4.8)
ALBUMIN/GLOB SERPL: 1.3 {RATIO} (ref 1–2)
ALP LIVER SERPL-CCNC: 87 U/L
ALT SERPL-CCNC: 74 U/L
ANION GAP SERPL CALC-SCNC: 8 MMOL/L (ref 0–18)
AST SERPL-CCNC: 55 U/L (ref ?–34)
B-HCG SERPL-ACNC: <2.6 MIU/ML
BASOPHILS # BLD AUTO: 0.08 X10(3) UL (ref 0–0.2)
BASOPHILS NFR BLD AUTO: 0.8 %
BILIRUB SERPL-MCNC: 0.4 MG/DL (ref 0.3–1.2)
BUN BLD-MCNC: 6 MG/DL (ref 9–23)
CALCIUM BLD-MCNC: 9.3 MG/DL (ref 8.7–10.4)
CHLORIDE SERPL-SCNC: 107 MMOL/L (ref 98–112)
CO2 SERPL-SCNC: 24 MMOL/L (ref 21–32)
CREAT BLD-MCNC: 0.57 MG/DL
DEPRECATED HBV CORE AB SER IA-ACNC: 59 NG/ML
EGFRCR SERPLBLD CKD-EPI 2021: 119 ML/MIN/1.73M2 (ref 60–?)
EOSINOPHIL # BLD AUTO: 0.24 X10(3) UL (ref 0–0.7)
EOSINOPHIL NFR BLD AUTO: 2.5 %
ERYTHROCYTE [DISTWIDTH] IN BLOOD BY AUTOMATED COUNT: 15.2 %
ESTRADIOL SERPL-MCNC: 77.2 PG/ML
FASTING STATUS PATIENT QL REPORTED: YES
FOLATE SERPL-MCNC: 13 NG/ML (ref 5.4–?)
FSH SERPL-ACNC: 1.5 MIU/ML
GLOBULIN PLAS-MCNC: 3.2 G/DL (ref 2–3.5)
GLUCOSE BLD-MCNC: 70 MG/DL (ref 70–99)
HCT VFR BLD AUTO: 42.6 %
HGB BLD-MCNC: 14.1 G/DL
IMM GRANULOCYTES # BLD AUTO: 0.04 X10(3) UL (ref 0–1)
IMM GRANULOCYTES NFR BLD: 0.4 %
IRON SATN MFR SERPL: 15 %
IRON SERPL-MCNC: 54 UG/DL
LH SERPL-ACNC: 3.1 MIU/ML
LYMPHOCYTES # BLD AUTO: 3.41 X10(3) UL (ref 1–4)
LYMPHOCYTES NFR BLD AUTO: 35 %
MCH RBC QN AUTO: 27.4 PG (ref 26–34)
MCHC RBC AUTO-ENTMCNC: 33.1 G/DL (ref 31–37)
MCV RBC AUTO: 82.7 FL
MONOCYTES # BLD AUTO: 0.71 X10(3) UL (ref 0.1–1)
MONOCYTES NFR BLD AUTO: 7.3 %
NEUTROPHILS # BLD AUTO: 5.25 X10 (3) UL (ref 1.5–7.7)
NEUTROPHILS # BLD AUTO: 5.25 X10(3) UL (ref 1.5–7.7)
NEUTROPHILS NFR BLD AUTO: 54 %
OSMOLALITY SERPL CALC.SUM OF ELEC: 284 MOSM/KG (ref 275–295)
PLATELET # BLD AUTO: 374 10(3)UL (ref 150–450)
POTASSIUM SERPL-SCNC: 4 MMOL/L (ref 3.5–5.1)
PROLACTIN SERPL-MCNC: 14.5 NG/ML
PROT SERPL-MCNC: 7.5 G/DL (ref 5.7–8.2)
RBC # BLD AUTO: 5.15 X10(6)UL
SODIUM SERPL-SCNC: 139 MMOL/L (ref 136–145)
TOTAL IRON BINDING CAPACITY: 370 UG/DL (ref 250–425)
TRANSFERRIN SERPL-MCNC: 292 MG/DL (ref 250–380)
VIT B12 SERPL-MCNC: 461 PG/ML (ref 211–911)
VIT D+METAB SERPL-MCNC: 5.9 NG/ML (ref 30–100)
VIT D+METAB SERPL-MCNC: 7.2 NG/ML (ref 30–100)
WBC # BLD AUTO: 9.7 X10(3) UL (ref 4–11)

## 2024-12-08 PROCEDURE — 82607 VITAMIN B-12: CPT | Performed by: INTERNAL MEDICINE

## 2024-12-08 PROCEDURE — 84410 TESTOSTERONE BIOAVAILABLE: CPT

## 2024-12-08 PROCEDURE — 83002 ASSAY OF GONADOTROPIN (LH): CPT

## 2024-12-08 PROCEDURE — 82746 ASSAY OF FOLIC ACID SERUM: CPT | Performed by: INTERNAL MEDICINE

## 2024-12-08 PROCEDURE — 85025 COMPLETE CBC W/AUTO DIFF WBC: CPT

## 2024-12-08 PROCEDURE — 84143 ASSAY OF 17-HYDROXYPREGNENO: CPT

## 2024-12-08 PROCEDURE — 84702 CHORIONIC GONADOTROPIN TEST: CPT

## 2024-12-08 PROCEDURE — 82306 VITAMIN D 25 HYDROXY: CPT

## 2024-12-08 PROCEDURE — 83550 IRON BINDING TEST: CPT | Performed by: INTERNAL MEDICINE

## 2024-12-08 PROCEDURE — 83540 ASSAY OF IRON: CPT | Performed by: INTERNAL MEDICINE

## 2024-12-08 PROCEDURE — 80053 COMPREHEN METABOLIC PANEL: CPT

## 2024-12-08 PROCEDURE — 82728 ASSAY OF FERRITIN: CPT | Performed by: INTERNAL MEDICINE

## 2024-12-08 PROCEDURE — 83001 ASSAY OF GONADOTROPIN (FSH): CPT

## 2024-12-08 PROCEDURE — 36415 COLL VENOUS BLD VENIPUNCTURE: CPT

## 2024-12-08 PROCEDURE — 84146 ASSAY OF PROLACTIN: CPT

## 2024-12-08 PROCEDURE — 82024 ASSAY OF ACTH: CPT

## 2024-12-08 PROCEDURE — 82533 TOTAL CORTISOL: CPT

## 2024-12-08 PROCEDURE — 82670 ASSAY OF TOTAL ESTRADIOL: CPT

## 2024-12-08 RX ORDER — ERGOCALCIFEROL 1.25 MG/1
50000 CAPSULE, LIQUID FILLED ORAL WEEKLY
Qty: 12 CAPSULE | Refills: 0 | Status: SHIPPED | OUTPATIENT
Start: 2024-12-08 | End: 2025-02-24

## 2024-12-08 RX ORDER — ERGOCALCIFEROL 1.25 MG/1
50000 CAPSULE, LIQUID FILLED ORAL WEEKLY
Qty: 12 CAPSULE | Refills: 0 | Status: SHIPPED | OUTPATIENT
Start: 2024-12-08 | End: 2025-03-02

## 2024-12-09 ENCOUNTER — TELEPHONE (OUTPATIENT)
Dept: RHEUMATOLOGY | Facility: CLINIC | Age: 39
End: 2024-12-09

## 2024-12-09 ENCOUNTER — TELEPHONE (OUTPATIENT)
Facility: CLINIC | Age: 39
End: 2024-12-09

## 2024-12-09 ENCOUNTER — MED REC SCAN ONLY (OUTPATIENT)
Facility: CLINIC | Age: 39
End: 2024-12-09

## 2024-12-09 NOTE — TELEPHONE ENCOUNTER
12/9/24-Received via fax from [employer] approval for Omnipod 5 Dex G7G6 pods Gen 5 effective 9/10/24-12/9/25.  Sent to scan.

## 2024-12-09 NOTE — TELEPHONE ENCOUNTER
Received request for PA from [employer] for patients Omnipod 5 Dexcom G7/G6 Pods. PA initiated in CMM. Questions answered and sent to plan. Will await determination.     Key: [id number]

## 2024-12-09 NOTE — TELEPHONE ENCOUNTER
Spoke with patient in regards to her lab results and vitamin D level. She states her endocrinologist just prescribed her the vitamin D 50,000 units. She is aware liver enzymes mildly elevated and to repeat all labs in early February.

## 2024-12-09 NOTE — TELEPHONE ENCOUNTER
PA populated in Counts include 234 beds at the [facility]. Questions answered and sent to plan. Will await determination.     Key: [id number]

## 2024-12-10 ENCOUNTER — APPOINTMENT (OUTPATIENT)
Dept: LAB | Age: 39
End: 2024-12-10
Payer: MEDICAID

## 2024-12-10 ENCOUNTER — TELEPHONE (OUTPATIENT)
Facility: CLINIC | Age: 39
End: 2024-12-10

## 2024-12-10 LAB — ACTH: 4.8 PG/ML

## 2024-12-10 PROCEDURE — 82533 TOTAL CORTISOL: CPT

## 2024-12-11 ENCOUNTER — PATIENT MESSAGE (OUTPATIENT)
Facility: CLINIC | Age: 39
End: 2024-12-11

## 2024-12-11 DIAGNOSIS — E11.65 TYPE 2 DIABETES MELLITUS WITH HYPERGLYCEMIA, WITH LONG-TERM CURRENT USE OF INSULIN (HCC): Primary | ICD-10-CM

## 2024-12-11 DIAGNOSIS — Z79.4 TYPE 2 DIABETES MELLITUS WITH HYPERGLYCEMIA, WITH LONG-TERM CURRENT USE OF INSULIN (HCC): Primary | ICD-10-CM

## 2024-12-11 RX ORDER — INSULIN PMP CART,AUT,G6/7,CNTR
1 EACH SUBCUTANEOUS
Qty: 1 KIT | Refills: 0 | Status: SHIPPED | OUTPATIENT
Start: 2024-12-11

## 2024-12-11 NOTE — TELEPHONE ENCOUNTER
Patient coming in to start Omnipod 5 + U500 tomorrow.  Going to start with lower TDD than what is presently being used (frequently going low); then can titrate PRN based on how algorithm does on pods  Current TDD 95u daily (475u/day in U500)  Will start with 85u daily, particularly if starting mounjaro soon (425u/day in U100 language)    Settings to be programmed as below:   Omnipod 5 + Dexcom G6 +  U500 insulin   Time Basal ICR ISF Active Insulin Time Target   MN 1.70 u/hr 6 21 4h 110

## 2024-12-12 NOTE — TELEPHONE ENCOUNTER
Mounjaro 2.5 mg PA approved    Approved on December 10 by [facility] 2017  The case has been Approved from [id number] to [id number]    Mychart update sent to patient.    Closing Encounter.

## 2024-12-13 LAB
17-OH PROGESTERONE: 74 NG/DL
SEX HORM BIND GLOB: 61 NMOL/L
TESTOST % FREE+WEAK BND: 13.1 %
TESTOST FREE+WEAK BND: 20.7 NG/DL
TESTOSTERONE TOT /MS: 158 NG/DL

## 2024-12-14 LAB
SALIVARY CORTISOL MS: 0.05 UG/DL
SALIVARY CORTISOL MS: <0.01 UG/DL

## 2024-12-18 ENCOUNTER — TELEPHONE (OUTPATIENT)
Facility: CLINIC | Age: 39
End: 2024-12-18

## 2024-12-18 DIAGNOSIS — N91.5 OLIGOMENORRHEA, UNSPECIFIED TYPE: Primary | ICD-10-CM

## 2024-12-18 DIAGNOSIS — R53.82 CHRONIC FATIGUE: ICD-10-CM

## 2024-12-18 DIAGNOSIS — E11.65 TYPE 2 DIABETES MELLITUS WITH HYPERGLYCEMIA, WITH LONG-TERM CURRENT USE OF INSULIN (HCC): ICD-10-CM

## 2024-12-18 DIAGNOSIS — Z79.4 TYPE 2 DIABETES MELLITUS WITH HYPERGLYCEMIA, WITH LONG-TERM CURRENT USE OF INSULIN (HCC): ICD-10-CM

## 2024-12-18 DIAGNOSIS — E55.9 VITAMIN D DEFICIENCY: ICD-10-CM

## 2024-12-18 DIAGNOSIS — L70.9 ACNE, UNSPECIFIED ACNE TYPE: ICD-10-CM

## 2024-12-18 RX ORDER — DEXAMETHASONE 1 MG
1 TABLET ORAL ONCE
Qty: 1 TABLET | Refills: 0 | Status: SHIPPED | OUTPATIENT
Start: 2024-12-18 | End: 2024-12-18

## 2024-12-18 NOTE — TELEPHONE ENCOUNTER
Ordered Dexamethasone 1 mg tablet and DST labs per written order.    Patient read itsDapper message

## 2024-12-18 NOTE — TELEPHONE ENCOUNTER
TE continued from results management note dated- 12/16/24.    Per Bisi:  \"Please phone patient-  Serum testosterone was quite high, needs to be worked up a bit more to ensure there's not anything else causing high testosterone:  Discussed case w/ Dr. Acosta. Recommend completing 1x DST. . After DST depending on what the ACTH is looking like, tentatively plan to evaluate DHEA (will be a separate lab order). From there can determine next steps / if imaging is needed.    Midnight salivary cortisol was normal.\"    RN phoned patient; patient made aware of above response/orders per Bisi. Will also send information to patient in a [employer] message. Patient verbalizes understanding.     ACTH, cortisol, and dexamethasone serum pended and routed to [employer] for approval.    Dexamethasone 1 mg tablet x 1 pended and routed to [employer] for approval.

## 2025-01-13 ENCOUNTER — NURSE ONLY (OUTPATIENT)
Facility: CLINIC | Age: 40
End: 2025-01-13
Payer: MEDICAID

## 2025-01-13 DIAGNOSIS — E11.65 TYPE 2 DIABETES MELLITUS WITH HYPERGLYCEMIA, WITH LONG-TERM CURRENT USE OF INSULIN (HCC): Primary | ICD-10-CM

## 2025-01-13 DIAGNOSIS — Z79.4 TYPE 2 DIABETES MELLITUS WITH HYPERGLYCEMIA, WITH LONG-TERM CURRENT USE OF INSULIN (HCC): Primary | ICD-10-CM

## 2025-01-13 NOTE — PROGRESS NOTES
Omnipod 5 Initial Pump Training    Keke Dumont  5/30/1985    This visit is conducted using Telemedicine with live, interactive video and audio.    Patient has been referred to the [facility] website at [email or web address] to review the yearly Consent to Treat document.    Patient understands and accepts financial responsibility for any deductible, co-insurance and/or co-pays associated with this service.     Referred by: THEO Treadwell     Initial Pump Settings     Omnipod 5 + Dexcom G6 +  U500 insulin   Time Basal ICR ISF Active Insulin Time Target   MN 1.70 u/hr 6 21 4h 110       Training Topics     - OP 5 algorithm overview and explanation   - Overview of screens on neeru / controller including visuals of CGM data, IOB, pod information and accessing the trend graph; instruction on overall physical use of the device.  - Overview of [place] menu and use of options as below   --> Switching modes (Manual vs Auto)    --> Activity mode / temp basal    --> Pod = pod info screen    --> CGM Calibration    --> Pause insulin (manual)    --> Manage programs (manual)    --> History / notifications    --> How to adjust settings   - How to place a pod on the body while using the neeru / controller to start the pod session  - Pod placement options and appropriate skin care  - Overview of additional information including travel instructions, water resistance, appropriate storage of supplies, pod failure instructions with insulin delivery back up plan and review of hypo / hyperglycemia treatment.  - Alerts and alarms; when to contact insulet vs contact the clinic  - Connected to clinic via [employer], walked patient through during training.       Patient shows positive ability to place and manage insulin pump appropriately during training.    Future Appointments   Date Time Provider Department Center   1/13/2025 12:15 PM EMG DIABETIC EDUCATOR ENDO [facility] [place]   2/1/2025  9:15 PM SCHEDULE BY DATE [facility]   2/11/2025  10:45 AM Caprice Shelby APN [facility] [place]   2/28/2025  8:45 AM Trenton Fisher MD [facility] [place]        1/13/2025  Vidhya Hooks RN, MSN, BC-[facility], [facility]  Diabetes Care &      A total of 60 minutes was spent with the patient including chart review, discussion and education / advisement pertinent to patient and provider specified concerns as documented above.     Note to patient: The 21 Century Cures Act makes medical notes like these available to patients in the interest of transparency. However, be advised this is a medical document. It is intended as peer to peer communication. It is written in medical language and may contain abbreviations or verbiage that are unfamiliar. It may appear blunt or direct. Medical documents are intended to carry relevant information, facts as evident, and the clinical opinion of the practitioner.

## 2025-01-16 ENCOUNTER — MED REC SCAN ONLY (OUTPATIENT)
Facility: CLINIC | Age: 40
End: 2025-01-16

## 2025-01-20 ENCOUNTER — NURSE ONLY (OUTPATIENT)
Facility: CLINIC | Age: 40
End: 2025-01-20
Payer: MEDICAID

## 2025-01-20 DIAGNOSIS — Z79.4 TYPE 2 DIABETES MELLITUS WITH HYPERGLYCEMIA, WITH LONG-TERM CURRENT USE OF INSULIN (HCC): Primary | ICD-10-CM

## 2025-01-20 DIAGNOSIS — E11.65 TYPE 2 DIABETES MELLITUS WITH HYPERGLYCEMIA, WITH LONG-TERM CURRENT USE OF INSULIN (HCC): Primary | ICD-10-CM

## 2025-01-20 NOTE — PROGRESS NOTES
Pump Settings Adjustment Evaluation - OP5    Keke Cedeñoz  5/30/1985    Referred by: THEO Treadwell       [facility]  -  This visit is conducted using Telemedicine with live, interactive video and audio. Patient has been referred to the [facility] website at [email or web address] to review the yearly Consent to Treat document. Patient understands and accepts financial responsibility for any deductible, co-insurance and/or co-pays associated with this service.     Medical Background      Past Medical History:    Arthritis    Asthma (HCC)    Diabetes (HCC)    Diabetes mellitus (HCC)      Lab Results   Component Value Date    A1C 7.2 (A) 07/19/2024    A1C 7.4 (A) 03/18/2024    A1C 7.4 (A) 05/23/2023            Patient has T2DM, seen today regarding pump setting check in after initial start     Medication Review      Diabetic Meds       Insulin Disp Start End     insulin regular human, conc, 500 UNIT/ML Subcutaneous Solution Pen-injector 120 mL 12/6/2024 --    Sig: Use to fill insulin pump with up to 650u per day.     Insulin Glargine, 2 Unit Dial, (TOUJEO MAX SOLOSTAR) 300 UNIT/ML Subcutaneous Solution Pen-injector 30 mL 10/23/2024 --    Sig: Use in the event of pump failure- Inject 300 Units into the skin daily.    Prior authorization: Approved     insulin aspart (NOVOLOG FLEXPEN) 100 Units/mL Subcutaneous Solution Pen-injector 45 mL 10/21/2024 --    Sig: Use in the event of pump failure. Take 50u three times daily before meals       Diabetic Supplies Disp Start End     Insulin Disposable Pump (OMNIPOD 5 [id number] INTRO GEN 5) Does not apply Kit 1 kit 12/11/2024 --    Sig - Route: 1 each every third day. - Does not apply     Insulin Disposable Pump (OMNIPOD 5 [id number] PODS GEN 5) Does not apply Misc 10 each 12/6/2024 --    Sig - Route: 1 Device every 3 (three) days. - Does not apply     Continuous Glucose Sensor (DEXCOM G7 SENSOR) Does not apply Misc 9 each 7/19/2024 --    Sig - Route: 1  each Every 10 days. - Does not apply    Patient not taking: Reported on 8/30/2024       Incretin Mimetic Agents Disp Start End     Tirzepatide (MOUNJARO) 2.5 MG/0.5ML Subcutaneous Solution Auto-injector 2 mL 12/6/2024 --    Sig - Route: Inject 2.5 mg into the skin once a week. - Subcutaneous             Patient Current Pump Settings:   Omnipod 5 + Dexcom G6 +  U500 insulin   Time Basal ICR ISF Active Insulin Time Target   MN 1.70 u/hr 6 21 4h 110         Pump Data Review          Interpretation of Data:  TIR not to goal, with hyperglycemia occurring after meal doses followed by residual hypoglycemia     Patient Concerns      - Likes the omnipod, but is having lows in the AM again   - Patient is putting omnipod into manual mode herself for overnight, however this is causing more significant drops as the pump is not able to stop insulin in the case of low trending values.   - Patient states is having more feelings of depression and anxiety lately, which may be contributing to fluctuations in blood glucose levels, reports having good resources to manage this.       Recommendations / Plan      - DO NOT flip into manual mode overnight     Future Appointments   Date Time Provider Department Center   1/20/2025  9:15 AM EMG DIABETIC EDUCATOR ENDO [facility] [place]   2/1/2025  9:15 PM SCHEDULE BY DATE [facility]   2/11/2025 10:45 AM Caprice Shelby APN [facility] [place]   2/21/2025 10:00 AM  US RM 5 [facility]   2/28/2025  8:45 AM Trenton Fisher MD EMGEUBSN [facility] [place]        1/20/2025  Vidhya Hooks RN, MSN, BC-ADM, [facility]  Diabetes Care &        A total of 10 minutes was spent with the patient including chart review, discussion and education / advisement pertinent to patient and provider specified concerns as documented above.     Note to patient: The 21 Century Cures Act makes medical notes like these available to patients in the interest of transparency. However, be advised this  is a medical document. It is intended as peer to peer communication. It is written in medical language and may contain abbreviations or verbiage that are unfamiliar. It may appear blunt or direct. Medical documents are intended to carry relevant information, facts as evident, and the clinical opinion of the practitioner.

## 2025-02-01 ENCOUNTER — OFFICE VISIT (OUTPATIENT)
Dept: SLEEP CENTER | Age: 40
End: 2025-02-01
Attending: Other
Payer: MEDICAID

## 2025-02-01 DIAGNOSIS — R06.81 APNEA: Primary | ICD-10-CM

## 2025-02-01 DIAGNOSIS — Z79.4 TYPE 2 DIABETES MELLITUS WITH HYPERGLYCEMIA, WITH LONG-TERM CURRENT USE OF INSULIN (HCC): ICD-10-CM

## 2025-02-01 DIAGNOSIS — R06.83 SNORING: ICD-10-CM

## 2025-02-01 DIAGNOSIS — M79.7 FIBROMYALGIA: ICD-10-CM

## 2025-02-01 DIAGNOSIS — M79.18 CHRONIC MUSCULOSKELETAL PAIN: ICD-10-CM

## 2025-02-01 DIAGNOSIS — E11.65 TYPE 2 DIABETES MELLITUS WITH HYPERGLYCEMIA, WITH LONG-TERM CURRENT USE OF INSULIN (HCC): ICD-10-CM

## 2025-02-01 DIAGNOSIS — M06.00 SERONEGATIVE RHEUMATOID ARTHRITIS (HCC): ICD-10-CM

## 2025-02-01 DIAGNOSIS — G89.29 CHRONIC MUSCULOSKELETAL PAIN: ICD-10-CM

## 2025-02-01 PROCEDURE — 95806 SLEEP STUDY UNATT&RESP EFFT: CPT

## 2025-02-03 RX ORDER — ACYCLOVIR 400 MG/1
TABLET ORAL
Qty: 9 EACH | Refills: 3 | Status: SHIPPED | OUTPATIENT
Start: 2025-02-03

## 2025-02-03 NOTE — TELEPHONE ENCOUNTER
Endocrine Refill protocol for CGM supplies     Protocol Criteria:  PASSED Reason: N/A    If below requirement is met, send a 90-day supply with 1 refill per provider protocol.     Verify appointment with Endocrinology completed in the last 12 months or scheduled in the next 6 months     Last completed office visit:12/6/2024 Caprice Shelby APN   Next scheduled Follow up:   Future Appointments   Date Time Provider Department Center   2/11/2025 10:45 AM Caprice Shelby APN [facility] [place]   2/21/2025 10:00 AM [facility] RM 5 [facility]   2/28/2025  8:45 AM Trenton Fisher MD EM[facility]BS[facility] [place]

## 2025-02-11 ENCOUNTER — TELEPHONE (OUTPATIENT)
Facility: CLINIC | Age: 40
End: 2025-02-11

## 2025-02-11 ENCOUNTER — TELEMEDICINE (OUTPATIENT)
Facility: CLINIC | Age: 40
End: 2025-02-11
Payer: MEDICAID

## 2025-02-11 DIAGNOSIS — R79.89 ELEVATED TESTOSTERONE LEVEL IN FEMALE: ICD-10-CM

## 2025-02-11 DIAGNOSIS — E11.65 TYPE 2 DIABETES MELLITUS WITH HYPERGLYCEMIA, WITH LONG-TERM CURRENT USE OF INSULIN (HCC): Primary | ICD-10-CM

## 2025-02-11 DIAGNOSIS — E11.69 HYPERLIPIDEMIA ASSOCIATED WITH TYPE 2 DIABETES MELLITUS (HCC): ICD-10-CM

## 2025-02-11 DIAGNOSIS — N91.5 OLIGOMENORRHEA, UNSPECIFIED TYPE: ICD-10-CM

## 2025-02-11 DIAGNOSIS — E55.9 VITAMIN D DEFICIENCY: ICD-10-CM

## 2025-02-11 DIAGNOSIS — E78.5 HYPERLIPIDEMIA ASSOCIATED WITH TYPE 2 DIABETES MELLITUS (HCC): ICD-10-CM

## 2025-02-11 DIAGNOSIS — Z46.81 INSULIN PUMP TITRATION: ICD-10-CM

## 2025-02-11 DIAGNOSIS — Z79.4 TYPE 2 DIABETES MELLITUS WITH HYPERGLYCEMIA, WITH LONG-TERM CURRENT USE OF INSULIN (HCC): Primary | ICD-10-CM

## 2025-02-11 PROCEDURE — 95251 CONT GLUC MNTR ANALYSIS I&R: CPT

## 2025-02-11 PROCEDURE — 99215 OFFICE O/P EST HI 40 MIN: CPT

## 2025-02-11 RX ORDER — TIRZEPATIDE 2.5 MG/.5ML
2.5 INJECTION, SOLUTION SUBCUTANEOUS WEEKLY
Qty: 2 ML | Refills: 0 | Status: SHIPPED | OUTPATIENT
Start: 2025-02-11

## 2025-02-11 NOTE — PROGRESS NOTES
[facility] Note - Telemedicine    Keke Dumont verbally consents to a Telemedicine (Audio + Video) visit on 12/6/24. The visit was conducted in a private room.       HISTORY OF PRESENT ILLNESS   Keke Dumont is a 39 year old female with PMHx significant for UC, RA, DM2 who presents for  DM mgmt.    Initial HPI consult in March 2024  Previously with DM [facility] [place], LOV 9/2023  Referred to endo for inadequate glycemic control    Diabetes history:  Type: 2 (antibodies negative)  Onset: 2016  Pt has not been admitted to the hospital for blood sugars.   Pt does not have a hx of pancreatitis.   Started on insulin in ~2022  Has been on Tandem insulin pump for about a year     Current DM regimen:  Humalog U-500 via T-slim with control IQ    Previous DM therapies:  Victoza,Trulicity - GI upset and nausea  Metformin - significant stomach cramping  Glipizide - significant cramping  MDI     Pt's had some low's since her LOV with [facility] and her latest Tandem pump settings as adjusted  are as follows: uses U-500  Has fibromyalgia and RA; uses cane to ambulate longer distances     Previously trialed/failed:   Victoza- severe nausea,Trulicity- stomach cramping   Metformin - significant stomach cramping  Glipizide - significant cramping    Interim hx:  2/11/2025-josey VENEGAS. Video visit. Last A1c value was 7.2% done 7/19/2024.  Started omnipod 5 Jan 13, 2025.  Likes the pump physically better than Tandem. Tried a site on her arm and had leakage - wearing it mostly on her abdomen. Rotates sites. Still going low in the morning; less frequent than Tandem. Started mounjaro. Did have some abdominal pain/constipation on mounjaro.  Stopped it after 4 weeks (ran out, didn't contact clinic).  Didn't notice any change to BG early on, but also notes she doesn't pay much attention to it. Losing her appetite, even off of mounjaro.   Prebolusing: Tends to dose as soon as she starts eating.   Ongoing stress: Thinks there  is black mold in her current apartment making her sick; planning on moving soon. When she stayed at another family member's home for a month, felt much better energy wise. She got a sleep study done and dx'd with SOLE, so working on that.  Hurt her back-- endorses lower back pain, wokeup with it yesterday.       Re: menses- Had not had a period since Feb 2024 and had a D&C planned for Oct 2024, had to postpone due to viral illness, now she wants to defer surgery. Did recently get a period: LMP: 1/26/25-2/1/25. Normal/heavy first three days then lightened up. This was her first period since Feb 2024. She thinks it's due to being more active the past 2 months- back and forth between mobile home in [place], doing home improvement.Serum testosterone was elevated. Needs to do the DST.   Re: acne- noted a lot of hormonal acne on her her chin and cheeks, has gotten a bit better.     DM meds at visit:   - not taking mounjaro  - Omnipod 5 in [employer] + Dexcom G7  Time Basal ICR ISF Active Insulin Time Target   MN 1.70 u/hr 6 21 4h 110   TDD 70u/day (350u/day in U100).     Diabetic Medications               Tirzepatide (MOUNJARO) 2.5 MG/0.5ML Subcutaneous Solution Auto-injector (Taking) Inject 2.5 mg into the skin once a week.    insulin regular human, conc, 500 UNIT/ML Subcutaneous Solution Pen-injector Use to fill insulin pump with up to 650u per day.    Insulin Glargine, 2 Unit Dial, (TOUJEO MAX SOLOSTAR) 300 UNIT/ML Subcutaneous Solution Pen-injector Use in the event of pump failure- Inject 300 Units into the skin daily.    insulin aspart (NOVOLOG FLEXPEN) 100 Units/mL Subcutaneous Solution Pen-injector Use in the event of pump failure. Take 50u three times daily before meals    GVOKE HYPOPEN 1-PACK 1 MG/0.2ML Subcutaneous SUBQ injection             Continuous Glucose Monitoring Interpretation  Keke Dumont has undergone continuous glucose monitoring with their CGM.  The blood glucose tracings were evaluated for two  weeks prior to office visit.   Blood glucose tracings demonstrated areas of hyperglycemia post-meal, particularly post-dinner  There were occasional hypoglycemia, particularly in the early morning. Her boluses before dinner appear to occur at the time of eating the meal .        Objective:    REVIEW OF SYSTEMS  Ten point review of systems has been performed and is otherwise negative and/or non-contributory, except as described above.     Medications:     Current Outpatient Medications:     Tirzepatide (MOUNJARO) 2.5 MG/0.5ML Subcutaneous Solution Auto-injector, Inject 2.5 mg into the skin once a week., Disp: 2 mL, Rfl: 0    Continuous Glucose Sensor (DEXCOM G7 SENSOR) Does not apply Misc, CHANGE 1 EVERY 10 DAYS, Disp: 9 each, Rfl: 3    Insulin Disposable Pump (OMNIPOD 5 [id number] INTRO GEN 5) Does not apply Kit, 1 each every third day., Disp: 1 kit, Rfl: 0    ergocalciferol 1.25 MG (58849 UT) Oral Cap, Take 1 capsule (50,000 Units total) by mouth once a week for 12 doses., Disp: 12 capsule, Rfl: 0    ergocalciferol 1.25 MG (58568 UT) Oral Cap, Take 1 capsule (50,000 Units total) by mouth once a week., Disp: 12 capsule, Rfl: 0    Insulin Disposable Pump (OMNIPOD 5 [id number] PODS GEN 5) Does not apply Misc, 1 Device every 3 (three) days., Disp: 10 each, Rfl: 6    insulin regular human, conc, 500 UNIT/ML Subcutaneous Solution Pen-injector, Use to fill insulin pump with up to 650u per day., Disp: 120 mL, Rfl: 1    Insulin Glargine, 2 Unit Dial, (TOUJEO MAX SOLOSTAR) 300 UNIT/ML Subcutaneous Solution Pen-injector, Use in the event of pump failure- Inject 300 Units into the skin daily., Disp: 30 mL, Rfl: 1    Urine Glucose-Ketones Test In Vitro Strip, Use as needed to test for ketones., Disp: 100 strip, Rfl: 1    insulin aspart (NOVOLOG FLEXPEN) 100 Units/mL Subcutaneous Solution Pen-injector, Use in the event of pump failure. Take 50u three times daily before meals, Disp: 45 mL, Rfl: 1    Insulin Pen Needle (BD PEN NEEDLE  GAVINO U/F) 32G X 4 MM Does not apply Misc, Inject 1 Pen into the skin in the morning, at noon, in the evening, and at bedtime., Disp: 200 each, Rfl: 1    Mometasone Furo-Formoterol Fum (DULERA) 200-5 MCG/ACT Inhalation Aerosol, Inhale 2 puffs into the lungs 2 (two) times daily., Disp: , Rfl:     fluticasone propionate 50 MCG/ACT Nasal Suspension, 1 spray by Nasal route 2 (two) times daily., Disp: , Rfl:     albuterol 108 (90 Base) MCG/ACT Inhalation Aero Soln, Inhale 2 puffs into the lungs every 4 (four) hours as needed for Shortness of Breath., Disp: , Rfl:     Glucose Blood (ONETOUCH VERIO) In Vitro Strip, Use to test once daily as needed to calibrate dexcom. (Patient not taking: Reported on 8/30/2024), Disp: 90 strip, Rfl: 3    GVOKE HYPOPEN 1-PACK 1 MG/0.2ML Subcutaneous SUBQ injection, , Disp: , Rfl:     Blood Glucose Monitoring Suppl (ONETOUCH VERIO IQ SYSTEM) w/Device Does not apply Kit, Use as directed. (Patient not taking: Reported on 8/30/2024), Disp: 1 kit, Rfl: 0    Blood Glucose Monitoring Suppl (ONETOUCH VERIO FLEX SYSTEM) w/Device Does not apply Kit, 1 Device As Directed. (Patient not taking: Reported on 8/30/2024), Disp: , Rfl:     SUMAtriptan 50 MG Oral Tab, TAKE 1 TABLET BY MOUTH 1 TIME FOR UP TO 1 DOSE AS NEEDED FOR MIGRAINE, Disp: , Rfl:      Allergies:   Allergies   Allergen Reactions    Milk ANAPHYLAXIS    Penicillins HIVES    Aspirin RASH and NAUSEA AND VOMITING    Codeine NAUSEA AND VOMITING    Ibuprofen NAUSEA AND VOMITING       Social History:   Social History     Socioeconomic History    Marital status:    Tobacco Use    Smoking status: Former     Current packs/day: 0.00     Average packs/day: 0.5 packs/day for 15.0 years (7.5 ttl pk-yrs)     Types: Cigarettes     Start date: 12/24/2006     Quit date: 12/24/2021     Years since quitting: 3.1    Smokeless tobacco: Never   Vaping Use    Vaping status: Never Used   Substance and Sexual Activity    Alcohol use: Not Currently    Drug use:  Not Currently       Medical History:   Reviewed    Surgical history:   Past Surgical History:   Procedure Laterality Date    Other      torn ligament and tendon to left ankle.        PHYSICAL EXAM  Limited due to telemedicine encounter    Constitutional Not in acute distress  Pulmonary: no wheezing heard  No coughing on the phone. Speaking in full sentences   Neurological:  Alert and oriented to person, place and time.   Psychiatric: Normal affect, mood and behavior appropriate  Skin: facial acne present. Acanthosis nigricans present on neck      Labs/Imaging: Reviewed.    Thyroid labs:  Recent Labs     11/20/22  1157 07/19/24  0847 08/19/24  1029   T4F  --  1.2 1.2   TSH 2.240 5.514* 2.704      DM Antibodies:   Per previous endo note:   LYLE-65 Antibody  <5 IU/mL <5  Insulin Antibodies  <0.4 U/mL <0.4         Component  Ref Range & Units 7/19/24  8:47 AM   ZNT8 Abs  U/mL <15         Latest Reference Range & Units 12/08/24 08:48   Prolactin No Established Reference Range for Females ng/mL 14.5   Estradiol No established range for female sex pg/mL 77.2   FSH No established range for female sex mIU/mL 1.5   LH No established range for female sex mIU/mL 3.1   HCG QUANTITATIVE <=4.2 mIU/mL <2.6   ACTH 7.2 - 63.3 pg/mL 4.8 (L)   Sex Horm Bind Glob 24.6 - 122.0 nmol/L 61.0   Testost % Free+Weak Bnd 3.0 - 18.0 % 13.1   Testost Free+Weak Bnd 0.0 - 9.5 ng/dL 20.7 (H)   Testosterone Tot LC/MS 10.0 - 55.0 ng/dL 158.0 (H)   (L): Data is abnormally low  (H): Data is abnormally high     Latest Reference Range & Units 12/08/24 12:00 12/10/24 00:00   Salivary Cortisol MS ug/dL <0.010 0.049         Assessment & Plan:     ICD-10-CM    1. Type 2 diabetes mellitus with hyperglycemia, with long-term current use of insulin ([facility])  E11.65 Tirzepatide (MOUNJARO) 2.5 MG/0.5ML Subcutaneous Solution Auto-injector    Z79.4       2. Insulin pump titration  Z46.81       3. Vitamin D deficiency  E55.9       4. Oligomenorrhea, unspecified type   N91.5       5. Elevated testosterone level in female  R79.89       6. Hyperlipidemia associated with type 2 diabetes mellitus (HCC)  E11.69     E78.5             #Type 2 diabetes mellitus with hyperglycemia, with long-term current use of insulin (HCC)  (primary encounter diagnosis)  Plan:   Pleasant 39 year old female with PMHx Type 2 diabetes mellitus dx'd age 31- in 2016; CHARITY ab negative.Utilized Tandem for 2 years (0102-5084).  Had persistent hypoglycemia in morning using U500.  Started omnipod 5 Jan 13, 2025.     Liking OmniPod so far.  Having hypoglycemia in the early morning, similar pattern to tandem.  Hypoglycemia is a bit better on this system.  Needs to prebolus before dinner.  Intensified ICR.  If hypoglycemia worsens, will adjust ICR at an earlier afternoon.  Start Mounjaro again at low-dose, will titrate slowly given previous abdominal pain.  Has been off for more than 4 weeks.  Advised due to the constipation, start bowel regimen with MiraLAX to help with tolerance.      Last A1c value was 7.2% done 7/19/2024. Goal <7%.     - pre bolus 15-20 minutes before you eat  - restart mounjaro 2.5mg weekly x 4 weeks  - take daily miralax with first four weeks of mounjaro- OTC; and can use dulcolax 5mg at night (OTC)   - keep settings the same: Omnipod 5 in [employer] + Dexcom G7  Time Basal ICR ISF Active Insulin Time Target   MN 1.70 u/hr 6 21 4h 110   5P  5          - previously trialed and intolerance of GLP, ORTEZ, metformin; discussed actos for insulin sensitization, pt politely declined as she doesn't like oral meds  - no strict CI for SGLT2i, previously discussed- she has frequent yeast infxn and doesn't want to try it out right now  - check BG 4x/day using glucometer or CGM  - Discussed adding GLP-1 to current medication regimen. Discussed action, risk vs benefit, dosing, and potential side effects. - Patient denies hx of pancreatitis, gastroparesis, or personal or family hx of medullary thyroid CA or MEN 2.    - patient is not pregnant or intending to become pregnant  - pump backup plan: Toujeo 200u/day, novolog 50u three times daily AC before meals or can use U500 60u/40u/40u before each meal    Previously intolerant:   - Victoza- severe nausea  - Trulicity- stomach cramping  - rybelsus- stomach cramping  - has not tried Ozempic  - Lantus- highly ineffective at doses 95u twice daily per chart review  - levermir- no longer being manufactured    Nephropathy screening   To goal, neg for nephropathy  Lab Results   Component Value Date    EGFRCR 119 12/08/2024    MICROALBCREA 22.1 07/19/2024      Blood pressure control: SBP- unable to assess, video visit   BP Readings from Last 1 Encounters:   08/30/24 136/86   BP Meds:  none    #Hyperlipidemia  - LDL is not to goal   Lab Results   Component Value Date     (H) 07/19/2024    TRIG 149 07/19/2024   Statin CI due to childbearing age-- given severe insulin resistance may want to consider statin       #Vitamin D deficiency  Vit D was 5.6 in 11/2024. Started vit D [id number] weekly. Planning on repeat soon.     #Oligomennorhea  #Acne  #Elevated serum testosterone  Following with gyne for amenorrhea s/p IUD removal April 2024. Unable to see outside records. Workup negative for other conditions that could mimic PCOS, including exclusion of nonclassic CAH, thyroid disease, hyperprolactinemia, hypercortisolism.  Given degree of serum testosterone elevation, going to evaluate adrenal fxn; pending DST.     Her menstrual cycle resumed for the first time in over a year after starting the Mounjaro 1 month later, so questionable if the GLP had some sort of positive effect hormonally.  - 1x DST  - consider serum DHEA pending DST  - continue follow up with gyn     Return in about 2 months (around 4/11/2025) for diabetes follow up.  Prev Dr. Carroll patient, discussed will follow up with [facility] until Dr Carroll returns ~spring 2025    A total of 50 minutes was spent today on obtaining history,  reviewing pertinent labs, evaluating patient, providing multiple treatment options, reinforcing diet/exercise and compliance, and completing documentation.       [facility]O Treadwell  2/11/2025

## 2025-02-11 NOTE — PATIENT INSTRUCTIONS
Return Visit:  With [facility] Treadwell: Return in about 2 months (around 4/11/2025) for diabetes follow up.    - meet with Kee in 3 weeks regarding mounjaro tolerance, if tolerating we will go up     PLAN:  Testosterone:  completing 1x dex suppression test    DIABETES:  - pre bolus 15-20 minutes before you eat  - restart mounjaro 2.5mg weekly x 4 weeks  - take daily miralax with first four weeks of mounjaro- OTC; and can use dulcolax 5mg at night (OTC)   - keep settings the same: Omnipod 5 in [employer] + Dexcom G7  Time Basal ICR ISF Active Insulin Time Target   MN 1.70 u/hr 6 21 4h 110   5P  5                    Updated diabetes medication instructions from today:   Diabetic Medications               Tirzepatide (MOUNJARO) 2.5 MG/0.5ML Subcutaneous Solution Auto-injector Inject 2.5 mg into the skin once a week.    insulin regular human, conc, 500 UNIT/ML Subcutaneous Solution Pen-injector Use to fill insulin pump with up to 650u per day.    Insulin Glargine, 2 Unit Dial, (TOUJEO MAX SOLOSTAR) 300 UNIT/ML Subcutaneous Solution Pen-injector Use in the event of pump failure- Inject 300 Units into the skin daily.    insulin aspart (NOVOLOG FLEXPEN) 100 Units/mL Subcutaneous Solution Pen-injector Use in the event of pump failure. Take 50u three times daily before meals    GVOKE HYPOPEN 1-PACK 1 MG/0.2ML Subcutaneous SUBQ injection             Lab Results   Component Value Date    A1C 7.2 (A) 07/19/2024    A1C 7.4 (A) 03/18/2024    A1C 7.4 (A) 05/23/2023        General follow up information:  Please let us know if you require any refills at least 1 week prior to your medication running out. If you do run out of medication, please call our office ASAP to request refills (do not wait until your follow up).   Please call our office if sugars at home are consistently greater than 250 or less than 70 for medication adjustment (do not wait until your follow up appointment).  Lab results and imaging will typically be reviewed  at follow up appointments, or within 3-5 business days of ALL results being in if you do not have an appointment scheduled in the near future. Our office will contact you for any abnormal results requiring more urgent follow up or action.   The on-call pager is for urgent matters only. If you are a type 1 diabetic and run out of insulin after business hours 8AM-4PM, you may call the on-call pager for a refill to a 24 hour pharmacy.  If you have adrenal insufficiency and run out of steroids, you may call the on-call pager for a refill to a 24 hour pharmacy. All other refill requests should be requested during business hours.    Office phone number: [phone]; phones are open Monday-Friday 8:30-4:30.       HOW TO TREAT LOW BLOOD SUGAR (Hypoglycemia)  Low blood sugar= Less than 70, or if you start to have symptoms (below)  Symptoms: Shaking or trembling, fast heart rate, extreme hunger, sweating, confusion/difficulty concentrating, dizziness.    How to treat a low blood sugar if you are able to eat/drink: The Rule of 15/15  If you are using continuous glucose monitor that says you are low, but you do not have any symptoms, verify on fingerstick that your blood sugar is actually low before treating.   Eat 15 grams of carbs (see examples below)  Check your blood sugar after 15 minutes. If it’s still below your target range, have another serving.   Repeat these steps until it’s in your target range. Once it’s in range, if you're nervous about your sugar going low again, have a protein source (ie, a spoonful of peanut butter).   If you have a CGM you want to look for how your arrow has changed. If you arrow is pointed up or sideways after 15 min, give your CGM more time OR check with a finger stick. Try not to eat more food until at least 15 min after the first BG check - otherwise you risk having a rebound high.  If you are experiencing symptoms and you are unable to check your blood glucose for any reason, treat the  hypoglycemia.  If someone has a low blood sugar and is unconscious: Don’t hesitate to call 911. If someone is unconscious and glucagon is not available or someone does not know how to use it, call 911 immediately.     To treat a low, I recommend you carry with you easy, pre-portioned treatment for low blood sugars that are 15G of carbs:   - Children sized squeeze pouch applesauce (high fiber + carbs help prevent too high of a spike)  - Small children's sized juicebox- 15g carb --> 4oz juice box  - Glucose tablets from [employer], you can find them near diabetes supplies --> Note, you will need to eat 3-4 tablets to get to 15g of carbs  - Children sized fruit snack pack- look for one with 15 grams of total carbohydrate  - Choice of how to treat your low is important. Complex carbs, or foods that contain fats along with carbs (like chocolate) can slow the absorption of glucose and should NOT be used to treat an emergency low

## 2025-02-11 NOTE — TELEPHONE ENCOUNTER
PA populated in [facility]. Questions answered and sent to plan. Will await determination.     Key:[id number]

## 2025-02-12 ENCOUNTER — MED REC SCAN ONLY (OUTPATIENT)
Facility: CLINIC | Age: 40
End: 2025-02-12

## 2025-02-12 ENCOUNTER — SLEEP STUDY (OUTPATIENT)
Facility: CLINIC | Age: 40
End: 2025-02-12
Payer: MEDICAID

## 2025-02-12 DIAGNOSIS — G47.9 SLEEP DISORDER: Primary | ICD-10-CM

## 2025-02-12 PROCEDURE — 95806 SLEEP STUDY UNATT&RESP EFFT: CPT | Performed by: OTHER

## 2025-02-12 NOTE — TELEPHONE ENCOUNTER
2/12/25-Received via fax from [employer] approval of Mounjaro 2.5mg effective 1/1/25-3/11/25.  Sent to scan.

## 2025-02-21 ENCOUNTER — HOSPITAL ENCOUNTER (OUTPATIENT)
Dept: ULTRASOUND IMAGING | Facility: HOSPITAL | Age: 40
Discharge: HOME | End: 2025-02-21
Attending: INTERNAL MEDICINE
Payer: MEDICAID

## 2025-02-21 DIAGNOSIS — M06.00 SERONEGATIVE RHEUMATOID ARTHRITIS (HCC): ICD-10-CM

## 2025-02-21 DIAGNOSIS — M79.18 CHRONIC MUSCULOSKELETAL PAIN: ICD-10-CM

## 2025-02-21 DIAGNOSIS — G89.29 CHRONIC MUSCULOSKELETAL PAIN: ICD-10-CM

## 2025-02-21 DIAGNOSIS — M79.7 FIBROMYALGIA: ICD-10-CM

## 2025-02-21 PROCEDURE — 76881 US COMPL JOINT R-T W/IMG: CPT | Performed by: INTERNAL MEDICINE

## 2025-02-28 ENCOUNTER — OFFICE VISIT (OUTPATIENT)
Dept: RHEUMATOLOGY | Facility: CLINIC | Age: 40
End: 2025-02-28
Payer: MEDICAID

## 2025-02-28 ENCOUNTER — TELEPHONE (OUTPATIENT)
Facility: CLINIC | Age: 40
End: 2025-02-28

## 2025-02-28 DIAGNOSIS — M70.61 GREATER TROCHANTERIC BURSITIS OF BOTH HIPS: ICD-10-CM

## 2025-02-28 DIAGNOSIS — E11.49 OTHER DIABETIC NEUROLOGICAL COMPLICATION ASSOCIATED WITH TYPE 2 DIABETES MELLITUS (HCC): ICD-10-CM

## 2025-02-28 DIAGNOSIS — M70.62 GREATER TROCHANTERIC BURSITIS OF BOTH HIPS: ICD-10-CM

## 2025-02-28 DIAGNOSIS — R79.89 LOW VITAMIN D LEVEL: ICD-10-CM

## 2025-02-28 DIAGNOSIS — M79.7 FIBROMYALGIA: Primary | ICD-10-CM

## 2025-02-28 PROBLEM — E11.40 DIABETIC NEUROPATHY (HCC): Status: ACTIVE | Noted: 2025-02-28

## 2025-02-28 PROBLEM — J06.9 VIRAL UPPER RESPIRATORY TRACT INFECTION: Status: ACTIVE | Noted: 2024-10-28

## 2025-02-28 PROBLEM — R05.1 ACUTE COUGH: Status: ACTIVE | Noted: 2024-11-01

## 2025-02-28 PROBLEM — N93.9 ABNORMAL UTERINE BLEEDING (AUB): Status: ACTIVE | Noted: 2024-09-18

## 2025-02-28 PROCEDURE — 99214 OFFICE O/P EST MOD 30 MIN: CPT | Performed by: INTERNAL MEDICINE

## 2025-02-28 RX ORDER — NORTRIPTYLINE HYDROCHLORIDE 25 MG/1
CAPSULE ORAL
Qty: 135 CAPSULE | Refills: 1 | Status: SHIPPED | OUTPATIENT
Start: 2025-02-28 | End: 2025-05-29

## 2025-02-28 RX ORDER — INSULIN LISPRO 100 [IU]/ML
INJECTION, SOLUTION INTRAVENOUS; SUBCUTANEOUS
COMMUNITY
Start: 2024-10-24

## 2025-02-28 RX ORDER — FEXOFENADINE HYDROCHLORIDE 180 MG/1
180 TABLET, FILM COATED ORAL DAILY
COMMUNITY
Start: 2024-11-01

## 2025-02-28 NOTE — PROGRESS NOTES
Rheumatology f/u Patient Note  =====================================================================================================    Chief complaint: seronegative RA  Chief Complaint   Patient presents with    Follow - Up     LOV 8/30/2024     PCP  A clinic   Bella Howard MD  [facility] Phone: [phone] ext [phone]  [facility] Fax: [phone]  =====================================================================================================  HPI   Date of visit: 11/14/2022    Keke Dumont is a 39 year old female   Patient presents for further evaluation of polyarthralgia.  Past medical history includes diabetes complicated by diabetic neuropathy, asthma requiring frequent corticosteroid tapers.  Polyarthralgia started in early 2022. This started after Covid infection shortly prior to development of arthralgia.  -Joints that are affected; wrists, ankles, elbows.  -Prior medications: tylenol (cut back b/c of mild ALT elevation)  -Movement makes pain worse.  -started working again,  at [employer], trying not to walk to much.   -Slipped and fell on stairs in feb 2022. Prior fall on ice x 3 years at [employer].   -Prior hx of UC, now rediagnosed as IBS.   -Multiple rounds of prednisone did not help.   -diabetic neuropathy in the feet. Chronic  -Diagnosis of chlamydia in September 2022.  No worsening of chronic arthritis.  Patient's arthralgia began several months prior to this infection  Medications:  Ibuprofen 800 mg BID: No benefit  Multiple rounds of prednisone 40 mg daily for asthma in the last 6 months without benef  ==============================================================================================================  Visit: 08/30/24  Unstable address. Couch surfing, staying in [place].  The patient's  went into a manic episode due to underlying bipolar.  Was punching the walls.  Patient called the police.  Police tried to arrest the patient.  Patient could not put her wrist together around  her back because of shoulder internal rotation limitations.  She has bad fibromyalgia, possible rheumatoid arthritis, diabetic neuropathy.  Cannot get Humira, mobility is not good.  Bilateral hips are painful.  Cannot lift leg  -restraining order at home.  -glc rise with cortisone.  Cannot get cortisone injections  -Has been out of amitriptyline since she cannot get the medication from home.  Pain is worse.  Diabetic neuropathy somewhat better.  Has been out of Humira for quite some time.  Has not obtained her gynecologic surgery yet.  ==============================================================================================================  Today's Visit: 02/28/25    Right eye swollen/with discharge, seeing eye doctor in the afternoon today  Mild SOLE noted. Seeing sleep doc in June/July  Finished Rx vit D. Needs to buy   Sleep is poor. Sleeping at 4 AM. Cannot fall asleep.   Tried cymbalta, and it failed   Amitriptyline wore off. Off this now.   Flexeril and muscle relaxers led to N/V in the past.     Medications:  Current Outpatient Medications on File Prior to Visit   Medication Sig Dispense Refill    ALLERGY RELIEF 180 MG Oral Tab Take 1 tablet (180 mg total) by mouth daily.      HUMALOG KWIKPEN 100 UNIT/ML Subcutaneous Solution Pen-injector USE IN THE EVENT OF PUMP FAILURE. INJECT 50 UNITS UNDER THE SKIN THREE TIMES DAILY BEFORE A MEAL.      Tirzepatide (MOUNJARO) 2.5 MG/0.5ML Subcutaneous Solution Auto-injector Inject 2.5 mg into the skin once a week. 2 mL 0    insulin regular human, conc, 500 UNIT/ML Subcutaneous Solution Pen-injector Use to fill insulin pump with up to 650u per day. 120 mL 1    Insulin Glargine, 2 Unit Dial, (TOUJEO MAX SOLOSTAR) 300 UNIT/ML Subcutaneous Solution Pen-injector Use in the event of pump failure- Inject 300 Units into the skin daily. 30 mL 1    insulin aspart (NOVOLOG FLEXPEN) 100 Units/mL Subcutaneous Solution Pen-injector Use in the event of pump failure. Take 50u three  times daily before meals 45 mL 1    Mometasone Furo-Formoterol Fum (DULERA) 200-5 MCG/ACT Inhalation Aerosol Inhale 2 puffs into the lungs 2 (two) times daily.      albuterol 108 (90 Base) MCG/ACT Inhalation Aero Soln Inhale 2 puffs into the lungs every 4 (four) hours as needed for Shortness of Breath.      GVOKE HYPOPEN 1-PACK 1 MG/0.2ML Subcutaneous SUBQ injection       SUMAtriptan 50 MG Oral Tab TAKE 1 TABLET BY MOUTH 1 TIME FOR UP TO 1 DOSE AS NEEDED FOR MIGRAINE      Continuous Glucose Sensor (DEXCOM G7 SENSOR) Does not apply Misc CHANGE 1 EVERY 10 DAYS (Patient not taking: Reported on 2/28/2025) 9 each 3    Insulin Disposable Pump (OMNIPOD 5 GERN0H4 INTRO GEN 5) Does not apply Kit 1 each every third day. (Patient not taking: Reported on 2/28/2025) 1 kit 0    Insulin Disposable Pump (OMNIPOD 5 NHOG0L4 PODS GEN 5) Does not apply Misc 1 Device every 3 (three) days. (Patient not taking: Reported on 2/28/2025) 10 each 6    Urine Glucose-Ketones Test In Vitro Strip Use as needed to test for ketones. (Patient not taking: Reported on 2/28/2025) 100 strip 1    Insulin Pen Needle (BD PEN NEEDLE GAVINO U/F) 32G X 4 MM Does not apply Misc Inject 1 Pen into the skin in the morning, at noon, in the evening, and at bedtime. (Patient not taking: Reported on 2/28/2025) 200 each 1    fluticasone propionate 50 MCG/ACT Nasal Suspension 1 spray by Nasal route 2 (two) times daily. (Patient not taking: Reported on 2/28/2025)      Glucose Blood (ONETOUCH VERIO) In Vitro Strip Use to test once daily as needed to calibrate dexcom. (Patient not taking: Reported on 2/28/2025) 90 strip 3    Blood Glucose Monitoring Suppl (ONETOUCH VERIO IQ SYSTEM) w/Device Does not apply Kit Use as directed. (Patient not taking: Reported on 2/28/2025) 1 kit 0    Blood Glucose Monitoring Suppl (ONETOUCH VERIO FLEX SYSTEM) w/Device Does not apply Kit 1 Device As Directed. (Patient not taking: Reported on 2/28/2025)       No current facility-administered  medications on file prior to visit.   ?  Allergies:  Allergies   Allergen Reactions    Milk ANAPHYLAXIS    Penicillins HIVES    Aspirin RASH and NAUSEA AND VOMITING    Codeine NAUSEA AND VOMITING    Ibuprofen NAUSEA AND VOMITING    Latex RASH         Objective    There were no vitals filed for this visit.    GEN: NAD, well-nourished.   HEENT: Head: NCAT. Face: No lesions. Eyes: Conjunctiva clear.   PULM:  easy effort  Extremities: No cyanosis, edema or deformities.   Neurologic: Strength, CN2-12 grossly intact   Skin: No lesions or rashes.  MSK:  Mild TTP all over (self-palpation)      ?  Labs:      12/2024  vitamin D 5.9  CBC W differential WNL  Creatinine 0.57, AST 55, ALT 74  B12/folate/ferritin wnl    Lab Results   Component Value Date    WBC 9.7 12/08/2024    RBC 5.15 12/08/2024    HGB 14.1 12/08/2024    HCT 42.6 12/08/2024    .0 12/08/2024    MCV 82.7 12/08/2024    MCH 27.4 12/08/2024    MCHC 33.1 12/08/2024    RDW 15.2 12/08/2024    NEPRELIM 5.25 12/08/2024    NEPERCENT 54.0 12/08/2024    LYPERCENT 35.0 12/08/2024    MOPERCENT 7.3 12/08/2024    EOPERCENT 2.5 12/08/2024    BAPERCENT 0.8 12/08/2024    NE 5.25 12/08/2024    LYMABS 3.41 12/08/2024    MOABSO 0.71 12/08/2024    EOABSO 0.24 12/08/2024    BAABSO 0.08 12/08/2024     Lab Results   Component Value Date    GLU 70 12/08/2024    BUN 6 (L) 12/08/2024    BUNCREA 15.6 06/09/2021    CREATSERUM 0.57 12/08/2024    ANIONGAP 8 12/08/2024    GFRNAA 120 09/20/2021    GFRAA 139 09/20/2021    CA 9.3 12/08/2024    OSMOCALC 284 12/08/2024    ALKPHO 87 12/08/2024    AST 55 (H) 12/08/2024    ALT 74 (H) 12/08/2024    BILT 0.4 12/08/2024    TP 7.5 12/08/2024    ALB 4.3 12/08/2024    GLOBULIN 3.2 12/08/2024     12/08/2024    K 4.0 12/08/2024     12/08/2024    CO2 24.0 12/08/2024 6/2023  Sed rate 39  CRP 1.08  CBC W differential WNL  Creatinine 2.59, AST 90,   A1c 7.4    3/2023  CBC W differential WNL  Creatinine 0.64, AST 71, , ALP  121    2022  CK 58  Vitamin B12 675  Vitamin D 13.3  TSH 2.240  Ferritin 103.1  G6PD 14.9  HLA-B27 negative  ANCA, MPO/IL-3 negative  RADHIKA is negative  Uric acid 3.9  CCP, RF negative  IGRA is negative  Sed rate 15  CRP 0.91  CBC normal.       2022:  Comment: Positive:  Presence of C. trachomatis (by MICHAEL)   Chlamydia trachomatis RNA detected by PCR.    HIV, hepatitis B surface antigen, RPR negative    2022  WBC 12.9, hemoglobin 14.3, platelets 363  Creatinine 0.76, ALT 54, rest of CMP normal    Additional Labs:    Radiology:    Radiology review:  US HAND BILATERAL COMPLETE (CPT=76881-50)    Result Date: 2025  CONCLUSION:   1.  No evidence of active synovitis or inflammatory arthritis.  2. Mild a vascular subtle synovial thickening of the interphalangeal joints of the thumbs can be seen with degenerative change.   LOCATION:  [facility]   Dictated by (CST): Juan Manuel Rock MD on 2025 at 12:50 PM     Finalized by (CST): Juan Manuel Rock MD on 2025 at 12:58 PM      =====================================================================================================  Assessment and Plan    Assessment:  1. Fibromyalgia    2. Greater trochanteric bursitis of both hips    3. Low vitamin D level    4. Primary obstructive sleep apnea of     5. Other diabetic neurological complication associated with type 2 diabetes mellitus (HCC)          #Fibromyalgia:  --no effect with cymbalta, failed amitriptyline.  --very low vitamin D noted  --mild SOLE diagnosed recently, will be seeing sleep medicine soon.    #Bilateral, left greater than right greater trochanteric bursitis  -did not do PT.     #previously suspected seronegative RA:   -Prior medications: failed methotrexate, hydroxychloroquine (plaquenil), humira  -now off medications  -US hands negative in  off all medications.    #Diabetes complicated by diabetic neuropathy: Primarily of the lower extremities.  #Bilateral carpal tunnel syndrome:   --2023  EMG/NCS with mild/moderate carpal tunnel syndrome  --improving overall with bracing    High risk medication labs including CMP and CBC w/ diff reviewed from 12/2024. LFTs from 9/13/2023. Elevated AST/ALT persistently noted. noted.  9/2022: HIV, hep C, hepatitis B surface antigen is negative  11/2022: Hepatitis B core antibody total, IGRA negative    Plan:    Need over-the-counter vitamin D3 2000 international units daily. Repeat vitamin D level    SOLE: see sleep medicine    PT; for fibromyalgia and greater trochanter bursitis    For fibromyalgia, diabetic neuropathy: start nortriptyline, uptitrate by 25 mg every 45 days to 50 mg at bedtime           Called patient via number listed in chart today    Original appnt date: Today's Visit: 02/28/25      This video telehealth visit (with [employer] ) was conducted in lieu of a previously scheduled clinic visit because of the COVID-19 pandemic. The patient or patient guardian verbally consented to virtual/remote treatment. All medical decision making was made in conjunction with the patient. This telehealth visit was initiated by the patient or patient guardian given the in-person appointment time as above who was located at [home]. The patient presented alone. Risks and benefits of therapy recommended, and potential side effects of all medications prescribed were discussed.  Patient was counseled on symptoms that would necessitate more emergency follow up.     The patient was within the [facility] during our visit.     Patient understands and accepts financial responsibility for any deductible, coinsurance and/or co-pays associated with the service. The patient understands that the physical exam will be limited.    This telehealth visit was performed while I was physically located in a   Medical office at [address] [place] [address], [address], [place], [place]      Diagnoses and all orders for this visit:    Fibromyalgia  -     nortriptyline 25 MG Oral Cap; Take 1 capsule  (25 mg total) by mouth nightly for 45 days, THEN 2 capsules (50 mg total) nightly.  -     Physical Therapy Referral - [facility]    Greater trochanteric bursitis of both hips  -     Physical Therapy Referral - [facility]    Low vitamin D level    Primary obstructive sleep apnea of     Other diabetic neurological complication associated with type 2 diabetes mellitus (HCC)                  No follow-ups on file.      The above plan of care, diagnosis, orders, and follow-up were discussed with the patient. Questions related to this recommended plan of care were answered.    Thank you for referring this delightful patient to me. Please feel free to contact me with any questions.     This report was performed utilizing speech recognition software technology. Despite proofreading, speech recognition errors could escape detection. If a word or phrase is confusing or out of context, please do not hesitate to call for   clarification.       Kind regards      Trenton Fisher MD  [facility]

## 2025-02-28 NOTE — TELEPHONE ENCOUNTER
Lvm for patient informing her of missed appointment today 2/28/25 and to call office to reschedule. First no show mailed to pt. Pt had video visit with Dr. Fisher, no show letter and charge canceled.

## 2025-03-06 ENCOUNTER — TELEPHONE (OUTPATIENT)
Facility: CLINIC | Age: 40
End: 2025-03-06

## 2025-03-06 NOTE — TELEPHONE ENCOUNTER
PA populated in [facility] for Mounjaro. Questions answered and sent to plan. Will await determination.     Key: [id number]

## 2025-03-07 NOTE — TELEPHONE ENCOUNTER
Reviewed Denial- states will not approve more than 4 pens in 180 days.     Routed for review.     Denial placed in RN purple folder.

## 2025-03-07 NOTE — TELEPHONE ENCOUNTER
Received fax from [employer] with a denial of prior authorization due to quantity limits. Placed in PA in basket.

## 2025-03-11 RX ORDER — TIRZEPATIDE 5 MG/.5ML
5 INJECTION, SOLUTION SUBCUTANEOUS WEEKLY
Qty: 2 ML | Refills: 1 | Status: SHIPPED | OUTPATIENT
Start: 2025-03-11

## 2025-03-19 ENCOUNTER — TELEPHONE (OUTPATIENT)
Facility: CLINIC | Age: 40
End: 2025-03-19

## 2025-03-19 ENCOUNTER — TELEMEDICINE (OUTPATIENT)
Facility: CLINIC | Age: 40
End: 2025-03-19
Payer: MEDICAID

## 2025-03-19 DIAGNOSIS — E11.65 TYPE 2 DIABETES MELLITUS WITH HYPERGLYCEMIA, WITH LONG-TERM CURRENT USE OF INSULIN (HCC): Primary | ICD-10-CM

## 2025-03-19 DIAGNOSIS — Z79.4 TYPE 2 DIABETES MELLITUS WITH HYPERGLYCEMIA, WITH LONG-TERM CURRENT USE OF INSULIN (HCC): Primary | ICD-10-CM

## 2025-03-19 NOTE — PROGRESS NOTES
Keke Dumont   5/30/1985 was seen for GLP1 Education /Start:    This visit is conducted using Telemedicine with live, interactive audio and video.  Patient verbally consents to Telemedicine visit.  Patient understands and accepts financial responsibility for any deductible, co-insurance and/or co-pays associated with this service.      Date: 3/19/2025  Referring Provider: THEO Treadwell Start time: 12:30pm End time: 1:00pm     Patient will be starting on the following Injectable doses: Mounjaro 2.5 mg weekly      Topics Discussed for GLP management:  Method of injection (use of needles if applicable)  injections sites  Before an after care of skin  Storage of medication  Unopened pens remain refrigerated; once opened, can remain at room temp for 21 days.  Missed dose procedure  Common side effects and when to contact providers office  Nutrition concerns as below  Travel considerations  Provided instructions on how to notify the office in case of backorders  If sharps container is necessary   Patient questions addressed      Nutrition Recommendations:    Diet Recommendations:  Eat regularly: try not to go longer than 4 hours between meals  Keep portions small and stop eating before you feel full  Focus on having protein at every meal. (Eggs, meat/poultry, fish, tofu, greek yogurt, nuts/seeds, beans/peas/lentils)  Eat more fiber- choose vegetables, fresh fruits, whole grains, and beans more often.  Avoid eating late at night  Drink at least 64 oz no-calorie liquid (such as 8, 8 oz glasses each day).  Eat slowly - it takes brain 20 minutes to realize you have eaten; if you eat too fast, you may get over full  Try to avoid distractions during meals and try to focus on your meal while eating.       Exercise as permitted by MD. If permitted, recommend strength training at least two times a week using body weight, exercise bands or weights.            Comments: Patient struggling to tolerate mounjaro at current 2.5  dose. She has experienced cramping, constipation and nausea. Encouraged drinking more water to help. Patient unable to tolerate miralax. Discussed IBS friendly fiber supplements and patient politely declined. Does not want to move up in dosing. Will continue at current dose. Will let us know if symptoms become untolerable and wants to discontinue.     We also discussed barrier options as omnipod is causing skin irritation.       Written materials were provided for all the content areas covered.  Patient performed accurate teach back of information supplied by Educator and is ready to start GLP 1 injections at home. Patient verbalized understanding and has no further questions at this time.    Plan: Patient is to follow up with MD office as instructed.      Stella Nazario RD, , LD, CDCES

## 2025-03-21 ENCOUNTER — TELEPHONE (OUTPATIENT)
Facility: CLINIC | Age: 40
End: 2025-03-21

## 2025-03-21 NOTE — TELEPHONE ENCOUNTER
Phoned patient to check in based on conversation with dietician.    Still endorses nausea, not able to eat, mounjaro is untolerable. Will stop mounjaro at this time.    Still going low overnight. Madeline reviewed. Advised to:  Make the following pump changes:    AIT:   MN: 4h --> 5h     ICR:   MN: 6   5PM 5 --> 4     Patient states understanding. Pump settings changed.  She has ongoing fevers due to her recent illness- reviewed when to return to . States understanding.

## 2025-03-21 NOTE — PROGRESS NOTES
Phoned patient to check in based on conversation with dietician.    Still endorses nausea, not able to eat, mounjaro is untolerable. Will stop mounjaro at this time.    Make the following pump changes:    AIT:   MN: 4h --> 5h     ICR:   MN: 6   5PM 5 --> 4     Patient states understanding. Pump settings changed.  She has ongoing fevers due to her recent illness- reviewed when to return to . States understanding.

## 2025-04-01 PROBLEM — J06.9 VIRAL UPPER RESPIRATORY TRACT INFECTION: Status: RESOLVED | Noted: 2024-10-28 | Resolved: 2025-04-01

## 2025-04-02 ENCOUNTER — MED REC SCAN ONLY (OUTPATIENT)
Facility: CLINIC | Age: 40
End: 2025-04-02

## 2025-04-08 ENCOUNTER — TELEPHONE (OUTPATIENT)
Dept: PHYSICAL THERAPY | Facility: HOSPITAL | Age: 40
End: 2025-04-08

## 2025-04-09 NOTE — TELEPHONE ENCOUNTER
LVM for patient after NC/NS to PT this date. Requested patient call back to confirm or deny next scheduled appt, reminded of this appt date and time. Therapist's direct line provided.

## 2025-04-12 ENCOUNTER — LAB ENCOUNTER (OUTPATIENT)
Dept: LAB | Facility: HOSPITAL | Age: 40
End: 2025-04-12
Payer: MEDICAID

## 2025-04-12 DIAGNOSIS — R79.89 LOW VITAMIN D LEVEL: ICD-10-CM

## 2025-04-12 DIAGNOSIS — E11.65 TYPE 2 DIABETES MELLITUS WITH HYPERGLYCEMIA, WITH LONG-TERM CURRENT USE OF INSULIN (HCC): ICD-10-CM

## 2025-04-12 DIAGNOSIS — L70.9 ACNE, UNSPECIFIED ACNE TYPE: ICD-10-CM

## 2025-04-12 DIAGNOSIS — E55.9 VITAMIN D DEFICIENCY: ICD-10-CM

## 2025-04-12 DIAGNOSIS — Z79.4 TYPE 2 DIABETES MELLITUS WITH HYPERGLYCEMIA, WITH LONG-TERM CURRENT USE OF INSULIN (HCC): ICD-10-CM

## 2025-04-12 DIAGNOSIS — Z51.81 THERAPEUTIC DRUG MONITORING: ICD-10-CM

## 2025-04-12 DIAGNOSIS — R53.82 CHRONIC FATIGUE: ICD-10-CM

## 2025-04-12 DIAGNOSIS — N91.5 OLIGOMENORRHEA, UNSPECIFIED TYPE: ICD-10-CM

## 2025-04-12 LAB
ALBUMIN SERPL-MCNC: 4.6 G/DL (ref 3.2–4.8)
ALBUMIN/GLOB SERPL: 1.6 {RATIO} (ref 1–2)
ALP LIVER SERPL-CCNC: 97 U/L (ref 37–98)
ALT SERPL-CCNC: 50 U/L (ref 10–49)
AMB EXT HGBA1C: 7.2 %
ANION GAP SERPL CALC-SCNC: 9 MMOL/L (ref 0–18)
AST SERPL-CCNC: 31 U/L (ref ?–34)
BILIRUB SERPL-MCNC: 0.2 MG/DL (ref 0.3–1.2)
BUN BLD-MCNC: 7 MG/DL (ref 9–23)
CALCIUM BLD-MCNC: 9.4 MG/DL (ref 8.7–10.6)
CHLORIDE SERPL-SCNC: 107 MMOL/L (ref 98–112)
CO2 SERPL-SCNC: 25 MMOL/L (ref 21–32)
CORTIS SERPL-MCNC: 13 UG/DL
CREAT BLD-MCNC: 0.68 MG/DL (ref 0.55–1.02)
EGFRCR SERPLBLD CKD-EPI 2021: 114 ML/MIN/1.73M2 (ref 60–?)
FASTING STATUS PATIENT QL REPORTED: YES
GLOBULIN PLAS-MCNC: 2.9 G/DL (ref 2–3.5)
GLUCOSE BLD-MCNC: 120 MG/DL (ref 70–99)
OSMOLALITY SERPL CALC.SUM OF ELEC: 291 MOSM/KG (ref 275–295)
POTASSIUM SERPL-SCNC: 3.7 MMOL/L (ref 3.5–5.1)
PROT SERPL-MCNC: 7.5 G/DL (ref 5.7–8.2)
SODIUM SERPL-SCNC: 141 MMOL/L (ref 136–145)
VIT D+METAB SERPL-MCNC: 5.7 NG/ML (ref 30–100)
VIT D+METAB SERPL-MCNC: 5.8 NG/ML (ref 30–100)

## 2025-04-12 PROCEDURE — 82306 VITAMIN D 25 HYDROXY: CPT

## 2025-04-12 PROCEDURE — 36415 COLL VENOUS BLD VENIPUNCTURE: CPT

## 2025-04-12 PROCEDURE — 82533 TOTAL CORTISOL: CPT

## 2025-04-12 PROCEDURE — 82024 ASSAY OF ACTH: CPT

## 2025-04-12 PROCEDURE — 80053 COMPREHEN METABOLIC PANEL: CPT

## 2025-04-14 LAB — ACTH: 6.5 PG/ML

## 2025-04-15 ENCOUNTER — PATIENT MESSAGE (OUTPATIENT)
Dept: RHEUMATOLOGY | Facility: CLINIC | Age: 40
End: 2025-04-15

## 2025-04-15 ENCOUNTER — OFFICE VISIT (OUTPATIENT)
Dept: PHYSICAL THERAPY | Facility: HOSPITAL | Age: 40
End: 2025-04-15
Attending: INTERNAL MEDICINE
Payer: MEDICAID

## 2025-04-15 DIAGNOSIS — M79.7 FIBROMYALGIA: Primary | ICD-10-CM

## 2025-04-15 DIAGNOSIS — M70.62 GREATER TROCHANTERIC BURSITIS OF BOTH HIPS: ICD-10-CM

## 2025-04-15 DIAGNOSIS — M70.61 GREATER TROCHANTERIC BURSITIS OF BOTH HIPS: ICD-10-CM

## 2025-04-15 DIAGNOSIS — R79.89 LOW VITAMIN D LEVEL: Primary | ICD-10-CM

## 2025-04-15 PROCEDURE — 97110 THERAPEUTIC EXERCISES: CPT

## 2025-04-15 PROCEDURE — 97162 PT EVAL MOD COMPLEX 30 MIN: CPT

## 2025-04-15 RX ORDER — ERGOCALCIFEROL 1.25 MG/1
50000 CAPSULE, LIQUID FILLED ORAL WEEKLY
Qty: 12 CAPSULE | Refills: 0 | Status: SHIPPED | OUTPATIENT
Start: 2025-04-15 | End: 2025-07-08

## 2025-04-29 ENCOUNTER — APPOINTMENT (OUTPATIENT)
Dept: PHYSICAL THERAPY | Facility: HOSPITAL | Age: 40
End: 2025-04-29
Attending: INTERNAL MEDICINE
Payer: MEDICAID

## 2025-04-30 ENCOUNTER — TELEMEDICINE (OUTPATIENT)
Facility: CLINIC | Age: 40
End: 2025-04-30
Payer: MEDICAID

## 2025-04-30 DIAGNOSIS — E11.65 TYPE 2 DIABETES MELLITUS WITH HYPERGLYCEMIA, WITH LONG-TERM CURRENT USE OF INSULIN (HCC): Primary | ICD-10-CM

## 2025-04-30 DIAGNOSIS — Z46.81 INSULIN PUMP TITRATION: ICD-10-CM

## 2025-04-30 DIAGNOSIS — R79.89 ELEVATED TESTOSTERONE LEVEL IN FEMALE: ICD-10-CM

## 2025-04-30 DIAGNOSIS — N91.5 OLIGOMENORRHEA, UNSPECIFIED TYPE: ICD-10-CM

## 2025-04-30 DIAGNOSIS — Z79.4 TYPE 2 DIABETES MELLITUS WITH HYPERGLYCEMIA, WITH LONG-TERM CURRENT USE OF INSULIN (HCC): Primary | ICD-10-CM

## 2025-04-30 DIAGNOSIS — E03.8 SUBCLINICAL HYPOTHYROIDISM: ICD-10-CM

## 2025-04-30 PROCEDURE — 99215 OFFICE O/P EST HI 40 MIN: CPT

## 2025-04-30 PROCEDURE — 95251 CONT GLUC MNTR ANALYSIS I&R: CPT

## 2025-04-30 RX ORDER — DEXAMETHASONE 1 MG
1 TABLET ORAL ONCE
Qty: 1 TABLET | Refills: 0 | Status: SHIPPED | OUTPATIENT
Start: 2025-04-30 | End: 2025-04-30

## 2025-04-30 RX ORDER — AVOBENZONE, HOMOSALATE, OCTISALATE, OCTOCRYLENE 30; 40; 45; 26 MG/ML; MG/ML; MG/ML; MG/ML
CREAM TOPICAL
Qty: 200 EACH | Refills: 3 | Status: SHIPPED | OUTPATIENT
Start: 2025-04-30

## 2025-04-30 NOTE — PATIENT INSTRUCTIONS
Return Visit:  With Dr. Dior: Return in about 2 months (around 6/30/2025) for diabetes follow up.      PLAN:  - complete DST- We have sent a prescription for a pill called dexamethasone 1 mg to your pharmacy, please pick this up at your pharmacy.  You will need to take this pill between 11 pm and midnight the night before your blood test.  The next morning please proceed to the lab to have your serum cortisol level drawn between 7 and 8 am.  Timing is very important for this test.  If the pill is not taken on time or the blood work is done late, you may have to repeat this test.      MEDS:  - lancets sent to pharmacy  - try your best to pre bolus 30 minutes before the meal  - try Videobotoo neeru   - try omnipod and G7 on outer thigh  - create new 7A profile for more robust insulin delivery mid day:  -  Omnipod 5 in [employer] + Dexcom G7  Time Basal ICR ISF Active Insulin Time Target   MN 1.70 u/hr 6 21 5h 110   7A (new profile)  5 19     5P --> change to 7pm  5 --> 6 21          Updated diabetes medication instructions from today:   Diabetic Medications              HUMALOG KWIKPEN 100 UNIT/ML Subcutaneous Solution Pen-injector USE IN THE EVENT OF PUMP FAILURE. INJECT 50 UNITS UNDER THE SKIN THREE TIMES DAILY BEFORE A MEAL.    insulin regular human, conc, 500 UNIT/ML Subcutaneous Solution Pen-injector Use to fill insulin pump with up to 650u per day.    Insulin Glargine, 2 Unit Dial, (TOUJEO MAX SOLOSTAR) 300 UNIT/ML Subcutaneous Solution Pen-injector Use in the event of pump failure- Inject 300 Units into the skin daily.    insulin aspart (NOVOLOG FLEXPEN) 100 Units/mL Subcutaneous Solution Pen-injector Use in the event of pump failure. Take 50u three times daily before meals    GVOKE HYPOPEN 1-PACK 1 MG/0.2ML Subcutaneous SUBQ injection             Lab Results   Component Value Date    A1C 7.2 04/12/2025    A1C 7.2 (A) 07/19/2024    A1C 7.4 (A) 03/18/2024    A1C 7.4 (A) 05/23/2023        General follow up  information:  Please let us know if you require any refills at least 1 week prior to your medication running out. If you do run out of medication, please call our office ASAP to request refills (do not wait until your follow up).   Please call our office if sugars at home are consistently greater than 250 or less than 70 for medication adjustment (do not wait until your follow up appointment).  Lab results and imaging will typically be reviewed at follow up appointments, or within 3-5 business days of ALL results being in if you do not have an appointment scheduled in the near future. Our office will contact you for any abnormal results requiring more urgent follow up or action.   The on-call pager is for urgent matters only. If you are a type 1 diabetic and run out of insulin after business hours 8AM-4PM, you may call the on-call pager for a refill to a 24 hour pharmacy.  If you have adrenal insufficiency and run out of steroids, you may call the on-call pager for a refill to a 24 hour pharmacy. All other refill requests should be requested during business hours.    Office phone number: [phone]; phones are open Monday-Friday 8:30-4:30.       HOW TO TREAT LOW BLOOD SUGAR (Hypoglycemia)  Low blood sugar= Less than 70, or if you start to have symptoms (below)  Symptoms: Shaking or trembling, fast heart rate, extreme hunger, sweating, confusion/difficulty concentrating, dizziness.    How to treat a low blood sugar if you are able to eat/drink: The Rule of 15/15  If you are using continuous glucose monitor that says you are low, but you do not have any symptoms, verify on fingerstick that your blood sugar is actually low before treating.   Eat 15 grams of carbs (see examples below)  Check your blood sugar after 15 minutes. If it’s still below your target range, have another serving.   Repeat these steps until it’s in your target range. Once it’s in range, if you're nervous about your sugar going low again, have a  protein source (ie, a spoonful of peanut butter).   If you have a CGM you want to look for how your arrow has changed. If you arrow is pointed up or sideways after 15 min, give your CGM more time OR check with a finger stick. Try not to eat more food until at least 15 min after the first BG check - otherwise you risk having a rebound high.  If you are experiencing symptoms and you are unable to check your blood glucose for any reason, treat the hypoglycemia.  If someone has a low blood sugar and is unconscious: Don’t hesitate to call 911. If someone is unconscious and glucagon is not available or someone does not know how to use it, call 911 immediately.     To treat a low, I recommend you carry with you easy, pre-portioned treatment for low blood sugars that are 15G of carbs:   - Children sized squeeze pouch applesauce (high fiber + carbs help prevent too high of a spike)  - Small children's sized juicebox- 15g carb --> 4oz juice box  - Glucose tablets from [employer], you can find them near diabetes supplies --> Note, you will need to eat 3-4 tablets to get to 15g of carbs  - Children sized fruit snack pack- look for one with 15 grams of total carbohydrate  - Choice of how to treat your low is important. Complex carbs, or foods that contain fats along with carbs (like chocolate) can slow the absorption of glucose and should NOT be used to treat an emergency low

## 2025-04-30 NOTE — PROGRESS NOTES
[facility] Note - Telemedicine    Keke Dumont verbally consents to a Telemedicine (Audio + Video) visit on 12/6/24. The visit was conducted in a private room.       HISTORY OF PRESENT ILLNESS   Keke Dumont is a 39 year old female with PMHx significant for UC, RA, DM2 who presents for  DM mgmt.    Initial HPI consult in March 2024  Previously with DM [facility] [place], LOV 9/2023  Referred to endo for inadequate glycemic control    Diabetes history:  Type: 2 (antibodies negative)  Onset: 2016  Pt has not been admitted to the hospital for blood sugars.   Pt does not have a hx of pancreatitis.   Started on insulin in ~2022  Has been on Tandem insulin pump for about a year     Current DM regimen:  Humalog U-500 via T-slim with control IQ    Previous DM therapies:  Victoza,Trulicity - GI upset and nausea  Metformin - significant stomach cramping  Glipizide - significant cramping  MDI     Pt's had some low's since her LOV with [facility] and her latest Tandem pump settings as adjusted  are as follows: uses U-500  Has fibromyalgia and RA; uses cane to ambulate longer distances     Previously trialed/failed:   Victoza- severe nausea,Trulicity- stomach cramping   Metformin - significant stomach cramping  Glipizide - significant cramping    Interim hx:  4/30/2025-ally/ [facility] VENEGAS. Last A1c value was 7.2% done 4/12/2025. Video visit   Last office visit, restarted mounjaro. Mid march, stopped Mounjaro again due to abdominal pain   Started omnipod 5 Jan 13, 2025 - still liking it but notes more leakage. Has tried abdomen and arm sites. Still having overnight lows  Noticing reduced appetite    Really stressed the last few days thus BG are higher   Had to cut down to 28h/week to stay on Medicaid so some financial stress  LMP: 4/11/25, getting more consistent, lasting 3-5 days       DM meds at visit:    - take daily miralax with first four weeks of mounjaro- OTC; and can use dulcolax 5mg at night (OTC)   - keep  settings the same: Omnipod 5 in [employer] + Dexcom G7  Time Basal ICR ISF Active Insulin Time Target   MN 1.70 u/hr 6 21 5h 110   5P  5           Continuous Glucose Monitoring Interpretation  Keke Dumont has undergone continuous glucose monitoring with their CGM.  The blood glucose tracings were evaluated for two weeks prior to office visit.   Blood glucose tracings demonstrated areas of hyperglycemia post-meal, particularly post-lunch and dinner  There were  frequent overnight/early AM hypoglycemia episodes  .          Objective:    REVIEW OF SYSTEMS  Ten point review of systems has been performed and is otherwise negative and/or non-contributory, except as described above.     Medications:     Current Outpatient Medications:     dexamethasone 1 MG Oral Tab, Take 1 tablet (1 mg total) by mouth one time for 1 dose. Take at 11pm the night before you are to complete labs. Then complete ACTH/cortisol testing at 8am the next morning., Disp: 1 tablet, Rfl: 0    Lancets Does not apply Misc, Use to test BG 2x/day, Disp: 200 each, Rfl: 3    ergocalciferol 1.25 MG (95912 UT) Oral Cap, Take 1 capsule (50,000 Units total) by mouth once a week., Disp: 12 capsule, Rfl: 0    ALLERGY RELIEF 180 MG Oral Tab, Take 1 tablet (180 mg total) by mouth daily., Disp: , Rfl:     HUMALOG KWIKPEN 100 UNIT/ML Subcutaneous Solution Pen-injector, USE IN THE EVENT OF PUMP FAILURE. INJECT 50 UNITS UNDER THE SKIN THREE TIMES DAILY BEFORE A MEAL., Disp: , Rfl:     nortriptyline 25 MG Oral Cap, Take 1 capsule (25 mg total) by mouth nightly for 45 days, THEN 2 capsules (50 mg total) nightly., Disp: 135 capsule, Rfl: 1    Continuous Glucose Sensor (DEXCOM G7 SENSOR) Does not apply Misc, CHANGE 1 EVERY 10 DAYS (Patient not taking: Reported on 2/28/2025), Disp: 9 each, Rfl: 3    Insulin Disposable Pump (OMNIPOD 5 [id number] INTRO GEN 5) Does not apply Kit, 1 each every third day. (Patient not taking: Reported on 2/28/2025), Disp: 1 kit, Rfl: 0    Insulin  Disposable Pump (OMNIPOD 5 [id number] PODS GEN 5) Does not apply Misc, 1 Device every 3 (three) days. (Patient not taking: Reported on 2/28/2025), Disp: 10 each, Rfl: 6    insulin regular human, conc, 500 UNIT/ML Subcutaneous Solution Pen-injector, Use to fill insulin pump with up to 650u per day., Disp: 120 mL, Rfl: 1    Insulin Glargine, 2 Unit Dial, (TOUJEO MAX SOLOSTAR) 300 UNIT/ML Subcutaneous Solution Pen-injector, Use in the event of pump failure- Inject 300 Units into the skin daily., Disp: 30 mL, Rfl: 1    Urine Glucose-Ketones Test In Vitro Strip, Use as needed to test for ketones. (Patient not taking: Reported on 2/28/2025), Disp: 100 strip, Rfl: 1    insulin aspart (NOVOLOG FLEXPEN) 100 Units/mL Subcutaneous Solution Pen-injector, Use in the event of pump failure. Take 50u three times daily before meals, Disp: 45 mL, Rfl: 1    Insulin Pen Needle (BD PEN NEEDLE GAVINO U/F) 32G X 4 MM Does not apply Misc, Inject 1 Pen into the skin in the morning, at noon, in the evening, and at bedtime. (Patient not taking: Reported on 2/28/2025), Disp: 200 each, Rfl: 1    Mometasone Furo-Formoterol Fum (DULERA) 200-5 MCG/ACT Inhalation Aerosol, Inhale 2 puffs into the lungs 2 (two) times daily., Disp: , Rfl:     fluticasone propionate 50 MCG/ACT Nasal Suspension, 1 spray by Nasal route 2 (two) times daily. (Patient not taking: Reported on 2/28/2025), Disp: , Rfl:     albuterol 108 (90 Base) MCG/ACT Inhalation Aero Soln, Inhale 2 puffs into the lungs every 4 (four) hours as needed for Shortness of Breath., Disp: , Rfl:     Glucose Blood (ONETOUCH VERIO) In Vitro Strip, Use to test once daily as needed to calibrate dexcom. (Patient not taking: Reported on 2/28/2025), Disp: 90 strip, Rfl: 3    GVOKE HYPOPEN 1-PACK 1 MG/0.2ML Subcutaneous SUBQ injection, , Disp: , Rfl:     SUMAtriptan 50 MG Oral Tab, TAKE 1 TABLET BY MOUTH 1 TIME FOR UP TO 1 DOSE AS NEEDED FOR MIGRAINE, Disp: , Rfl:      Allergies:   Allergies   Allergen  Reactions    Milk ANAPHYLAXIS    Penicillins HIVES    Aspirin RASH and NAUSEA AND VOMITING    Codeine NAUSEA AND VOMITING    Ibuprofen NAUSEA AND VOMITING    Latex RASH       Social History:   Social History     Socioeconomic History    Marital status:    Tobacco Use    Smoking status: Former     Current packs/day: 0.00     Average packs/day: 0.5 packs/day for 15.0 years (7.5 ttl pk-yrs)     Types: Cigarettes     Start date: 12/24/2006     Quit date: 12/24/2021     Years since quitting: 3.3    Smokeless tobacco: Never   Vaping Use    Vaping status: Never Used   Substance and Sexual Activity    Alcohol use: Not Currently    Drug use: Not Currently       Medical History:   Reviewed    Surgical history:   Past Surgical History:   Procedure Laterality Date    Other      torn ligament and tendon to left ankle.        PHYSICAL EXAM  Limited due to telemedicine encounter    Constitutional Not in acute distress  Pulmonary: no wheezing heard  No coughing on the phone. Speaking in full sentences   Neurological:  Alert and oriented to person, place and time.   Psychiatric: Normal affect, mood and behavior appropriate  Skin: facial acne present. Acanthosis nigricans present on neck      Labs/Imaging: Reviewed.    Thyroid labs:  Recent Labs     11/20/22  1157 07/19/24  0847 08/19/24  1029   T4F  --  1.2 1.2   TSH 2.240 5.514* 2.704      DM Antibodies:   Per previous endo note:   LYLE-65 Antibody  <5 IU/mL <5  Insulin Antibodies  <0.4 U/mL <0.4         Component  Ref Range & Units 7/19/24  8:47 AM   ZNT8 Abs  U/mL <15         Latest Reference Range & Units 12/08/24 08:48   Prolactin No Established Reference Range for Females ng/mL 14.5   Estradiol No established range for female sex pg/mL 77.2   FSH No established range for female sex mIU/mL 1.5   LH No established range for female sex mIU/mL 3.1   HCG QUANTITATIVE <=4.2 mIU/mL <2.6   ACTH 7.2 - 63.3 pg/mL 4.8 (L)   Sex Horm Bind Glob 24.6 - 122.0 nmol/L 61.0   Testost %  Free+Weak Bnd 3.0 - 18.0 % 13.1   Testost Free+Weak Bnd 0.0 - 9.5 ng/dL 20.7 (H)   Testosterone Tot LC/MS 10.0 - 55.0 ng/dL 158.0 (H)   (L): Data is abnormally low  (H): Data is abnormally high     Latest Reference Range & Units 12/08/24 12:00 12/10/24 00:00   Salivary Cortisol MS ug/dL <0.010 0.049         Assessment & Plan:     ICD-10-CM    1. Type 2 diabetes mellitus with hyperglycemia, with long-term current use of insulin ([facility])  E11.65 Microalb/Creat Ratio, Random Urine    Z79.4       2. Insulin pump titration  Z46.81       3. Subclinical hypothyroidism  E03.8 TSH and Free T4 [E]     Thyroid peroxidase & thyroglobulin ab      4. Oligomenorrhea, unspecified type  N91.5       5. Elevated testosterone level in female  R79.89 Dexamethasone, Serum     ACTH, Plasma     Cortisol     dexamethasone 1 MG Oral Tab        #Type 2 diabetes mellitus with hyperglycemia, with long-term current use of insulin ([facility])  (primary encounter diagnosis)  Plan:   Pleasant 39 year old female with PMHx Type 2 diabetes mellitus dx'd age 31- in 2016; CHARITY ab negative.Utilized Tandem for 2 years ([id number]).  Had persistent hypoglycemia in morning using U500.  Started omnipod 5 Jan 13, 2025.     Intolerant of GLP. Liking OmniPod so far.  Having hypoglycemia in the early morning, similar pattern to tandem. Will incr ICR during the day (hopefully going into dinner w/ better control), lighten dinner dose as it tends to stack and create early AM hypoglycemia pattern. Discussed ensuring carb counting accuracy, try gluroo neeru, still eating quite high carb.     Last A1c value was 7.2% done 4/12/2025. Goal <7%.     - try your best to pre bolus 30 minutes before the meal  - try gluroo neeur   - try omnipod and G7 on outer thigh  - create new 7A profile for more robust insulin delivery mid day:  -  Omnipod 5 in [employer] + Dexcom G7  Time Basal ICR ISF Active Insulin Time Target   MN 1.70 u/hr 6 21 5h 110   7A (new profile)  5 19     5P --> change to 7pm   5 --> 6 21         - previously trialed and intolerance of GLP, ORTEZ, metformin; discussed actos for insulin sensitization, pt politely declined as she doesn't like oral meds  - no strict CI for SGLT2i, previously discussed- she has frequent yeast infxn and doesn't want to try it out right now  - check BG 4x/day using glucometer or CGM  - Discussed adding GLP-1 to current medication regimen. Discussed action, risk vs benefit, dosing, and potential side effects. - Patient denies hx of pancreatitis, gastroparesis, or personal or family hx of medullary thyroid CA or MEN 2.   - patient is not pregnant or intending to become pregnant  - pump backup plan: Toujeo 200u/day, novolog 50u three times daily AC before meals or can use U500 60u/40u/40u before each meal    Previously intolerant:   - Victoza- severe nausea  - Trulicity- stomach cramping  - rybelsus- stomach cramping  - has not tried Ozempic  - Lantus- highly ineffective at doses 95u twice daily per chart review  - levermir- no longer being manufactured  - Mounjaro- abd pain, stopped March 2025    Nephropathy screening   To goal, neg for nephropathy  - repeat MAB 7/2025  Lab Results   Component Value Date    EGFRCR 114 04/12/2025    MICROALBCREA 22.1 07/19/2024      Blood pressure control: SBP- unable to assess, video visit   BP Readings from Last 1 Encounters:   08/30/24 136/86   BP Meds:  none    #Hyperlipidemia  - LDL is not to goal, she started statin with PCP in Jan 2025 and was unable to tolerate  Lab Results   Component Value Date     (H) 07/19/2024    TRIG 149 07/19/2024        #Vitamin D deficiency  Vit D was 5.6 in 11/2024. Started vit D 78138UE weekly. Still low- repleting w/ rheum provider  - continue 50K international unit  weekly    #Oligomennorhea  #Acne  #Elevated serum testosterone  Following with gyne for amenorrhea s/p IUD removal April 2024. Unable to see outside records. Menses have re-regulated and are occurring monthly now. Workup  negative for other conditions that could mimic PCOS, including exclusion of nonclassic CAH, thyroid disease, hyperprolactinemia, hypercortisolism.  Given degree of serum testosterone elevation, going to evaluate adrenal fxn; pending DST.   - 1x DST - due for repeat, ordered   - consider serum DHEA pending DST (case discussed with Dr. Acosta)  - continue follow up with gyn       #Subclinical hypothyroidism   Hx of hypothyroidism in her mother (age 50). Tsh mildly abnormal in April 2025- no hx of thyroid dx for patient. Denies taking biotin containing supplements.  - repeat TFTs, add antiTPO/Tg, plan pending results      Prev Dr. Carroll patient, discussed with patient, would like to follow up with endo physician next appointment to discuss elevated testosterone- will coordinate w sched staff. [facility] follow up in 4mo.     A total of 40 minutes was spent today on obtaining history, reviewing omnipod report,  reviewing pertinent labs, evaluating patient, providing multiple treatment options, reinforcing diet/exercise and compliance, and completing documentation.       [facility] Treadwell  4/30/2025

## 2025-05-06 ENCOUNTER — APPOINTMENT (OUTPATIENT)
Dept: PHYSICAL THERAPY | Facility: HOSPITAL | Age: 40
End: 2025-05-06
Attending: INTERNAL MEDICINE
Payer: MEDICAID

## 2025-05-08 ENCOUNTER — PATIENT MESSAGE (OUTPATIENT)
Facility: CLINIC | Age: 40
End: 2025-05-08

## 2025-05-12 NOTE — TELEPHONE ENCOUNTER
HYPOGLYCEMIA TRIAGE    RN spoke with patient regarding current reported HYPOglycemia.     When did the more frequent hypoglycemia start? When provider changed her settings on May 8th 2025. Only in AM.     Has the patient recently STOPPED steroid treatment? No patient has not been on any steroids.     RN confirmed patient is currently taking all of the below listed diabetes medications:  Diabetic Medications              HUMALOG KWIKPEN 100 UNIT/ML Subcutaneous Solution Pen-injector USE IN THE EVENT OF PUMP FAILURE. INJECT 50 UNITS UNDER THE SKIN THREE TIMES DAILY BEFORE A MEAL.    insulin regular human, conc, 500 UNIT/ML Subcutaneous Solution Pen-injector Use to fill insulin pump with up to 650u per day.    Insulin Glargine, 2 Unit Dial, (TOUJEO MAX SOLOSTAR) 300 UNIT/ML Subcutaneous Solution Pen-injector Use in the event of pump failure- Inject 300 Units into the skin daily.    insulin aspart (NOVOLOG FLEXPEN) 100 Units/mL Subcutaneous Solution Pen-injector Use in the event of pump failure. Take 50u three times daily before meals    GVOKE HYPOPEN 1-PACK 1 MG/0.2ML Subcutaneous SUBQ injection           Humalog in case of pump failure.   Toujeo in case pump failure.   Omnipod 5   Novolog pump failure   Insulin regular human 500 unit use to fill pump 650u.     How is patient checking blood glucose? Patient is on omnipod pump  If using CGM did they double check low value with glucometer: Patient uses CGM and has double checked values with glucometer Since lows are true, RN added CGM report to [facility] folder and scheduled patient for a phone call visit with [facility] on the next open clinic date.  Routed as FYI to [facility].  Closing encounter.   If using Glucometer:  Not applicable, patient using CGM     Has patient used their glucagon to treat a recent low? : No patient does not have any. Applejuice   Has patient called 911 for a recent low? No    Is the low symptomatic? Yes; comment: Patient states nausea, shakiness, vision  goes patient can't see.       Routing to provider for review.     Shaista LUNA RN  5/12/2025    arsymptoms

## 2025-05-13 ENCOUNTER — APPOINTMENT (OUTPATIENT)
Dept: PHYSICAL THERAPY | Facility: HOSPITAL | Age: 40
End: 2025-05-13
Attending: INTERNAL MEDICINE
Payer: MEDICAID

## 2025-05-16 ENCOUNTER — OFFICE VISIT (OUTPATIENT)
Dept: RHEUMATOLOGY | Facility: CLINIC | Age: 40
End: 2025-05-16
Payer: MEDICAID

## 2025-05-16 VITALS
BODY MASS INDEX: 41.56 KG/M2 | SYSTOLIC BLOOD PRESSURE: 118 MMHG | WEIGHT: 206.13 LBS | OXYGEN SATURATION: 97 % | HEART RATE: 95 BPM | TEMPERATURE: 98 F | DIASTOLIC BLOOD PRESSURE: 72 MMHG | HEIGHT: 59 IN | RESPIRATION RATE: 16 BRPM

## 2025-05-16 DIAGNOSIS — M25.552 BILATERAL HIP PAIN: ICD-10-CM

## 2025-05-16 DIAGNOSIS — M25.571 CHRONIC PAIN OF BOTH ANKLES: ICD-10-CM

## 2025-05-16 DIAGNOSIS — M54.50 LUMBAR SPINE PAIN: ICD-10-CM

## 2025-05-16 DIAGNOSIS — R79.89 LOW VITAMIN D LEVEL: ICD-10-CM

## 2025-05-16 DIAGNOSIS — M79.672 BILATERAL FOOT PAIN: ICD-10-CM

## 2025-05-16 DIAGNOSIS — M25.572 CHRONIC PAIN OF BOTH ANKLES: ICD-10-CM

## 2025-05-16 DIAGNOSIS — M25.551 BILATERAL HIP PAIN: ICD-10-CM

## 2025-05-16 DIAGNOSIS — M79.7 FIBROMYALGIA: Primary | ICD-10-CM

## 2025-05-16 DIAGNOSIS — M79.671 BILATERAL FOOT PAIN: ICD-10-CM

## 2025-05-16 DIAGNOSIS — M25.562 CHRONIC PAIN OF BOTH KNEES: ICD-10-CM

## 2025-05-16 DIAGNOSIS — G89.29 CHRONIC PAIN OF BOTH KNEES: ICD-10-CM

## 2025-05-16 DIAGNOSIS — G89.29 CHRONIC PAIN OF BOTH ANKLES: ICD-10-CM

## 2025-05-16 DIAGNOSIS — M25.561 CHRONIC PAIN OF BOTH KNEES: ICD-10-CM

## 2025-05-16 PROCEDURE — 99214 OFFICE O/P EST MOD 30 MIN: CPT | Performed by: INTERNAL MEDICINE

## 2025-05-16 RX ORDER — PREGABALIN 75 MG/1
75 CAPSULE ORAL 2 TIMES DAILY
Qty: 180 CAPSULE | Refills: 1 | Status: SHIPPED | OUTPATIENT
Start: 2025-05-16

## 2025-05-16 NOTE — PATIENT INSTRUCTIONS
Try pregabalin 75 mg capsules  -take one capsule in the evening for 2 weeks, if doing ok then increase to one capsule twice daily    Restart carpal tunnel syndrome braces. Go to [facility] and check out their selection  -wear this nightly    ==============================================================================================================  -Acetaminophen (Tylenol): Take two 650 mg extended release (arthritis) capsules in the morning (around 8 AM) and in the early afternoon (around 4 PM)    .[email or web address]

## 2025-05-16 NOTE — H&P
The following individual(s) verbally consented to be recorded using ambient AI listening technology and understand that they can each withdraw their consent to this listening technology at any point by asking the clinician to turn off or pause the recording:    Patient name: Keke Dumont  Additional names:

## 2025-05-16 NOTE — PROGRESS NOTES
Rheumatology f/u Patient Note  =====================================================================================================    Chief complaint: seronegative RA  Chief Complaint   Patient presents with    Follow - Up     LOV 2/28/2025  Rapid 3 score is 8.7  Patient states it is getting harder to walk due to excruciating pain in her legs and feet 10/10. She states the pain is constant. Had to decrease from full time to less than part time at work.       PCP  [facility]   Bella Howard MD  [facility] Phone: [phone] ext [phone]  [facility] Fax: [phone]  =====================================================================================================  HPI   Date of visit: 11/14/2022    Keke Dumont is a 39 year old female   Patient presents for further evaluation of polyarthralgia.  Past medical history includes diabetes complicated by diabetic neuropathy, asthma requiring frequent corticosteroid tapers.  Polyarthralgia started in early 2022. This started after Covid infection shortly prior to development of arthralgia.  -Joints that are affected; wrists, ankles, elbows.  -Prior medications: tylenol (cut back b/c of mild ALT elevation)  -Movement makes pain worse.  -started working again,  at [employer] best, trying not to walk to much.   -Slipped and fell on stairs in feb 2022. Prior fall on ice x 3 years at [employer].   -Prior hx of UC, now rediagnosed as IBS.   -Multiple rounds of prednisone did not help.   -diabetic neuropathy in the feet. Chronic  -Diagnosis of chlamydia in September 2022.  No worsening of chronic arthritis.  Patient's arthralgia began several months prior to this infection  Medications:  Ibuprofen 800 mg BID: No benefit  Multiple rounds of prednisone 40 mg daily for asthma in the last 6 months without benef  ==============================================================================================================  Visit: 02/28/25  Right eye swollen/with discharge, seeing eye doctor  in the afternoon today  Mild SOLE noted. Seeing sleep doc in June/July  Finished Rx vit D. Needs to buy   Sleep is poor. Sleeping at 4 AM. Cannot fall asleep.   Tried cymbalta, and it failed   Amitriptyline wore off. Off this now.   Flexeril and muscle relaxers led to N/V in the past.   ==============================================================================================================  Today's Visit: 05/16/25    Keke Dumont is a 39 year old female with fibromyalgia who presents with worsening pain in her feet and ankles.    She has been experiencing increased pain in her feet and ankles, progressively worsening over the past few months. The pain is described as shooting from her feet and ankles to her calves and is present in both legs. This has significantly impacted her daily activities, including her ability to walk, bathe, and work, leading her to reduce her work hours from full-time to part-time. No numbness or tingling in her feet, but she reports a recent trip due to her 's shoes being left out. No falls have occurred.    She has a history of left ankle surgery for ligament and tendon repair, which was complicated by a permanent sprain. The pain in her feet and ankles has been present for some time but has become more severe recently. She has previously tried Humira without significant benefit and has discontinued its use. She is currently taking nortriptyline 50 mg, which has provided some improvement in her sleep, allowing her to sleep from 1 AM to 8 AM.  Previously fell asleep at 4 AM every evening.    She reports widespread pain associated with her fibromyalgia, which has worsened. She experiences pain in her hips, knees, and lower back, with the right knee being particularly painful. She has previously received cortisone injections, which elevated her blood sugar levels significantly. She is unable to take ibuprofen due to an upcoming liver ultrasound and reports a history of liver  issues.    She has a history of carpal tunnel syndrome, which affects her ability to use a cane due to pain and weakness in her hands. She has not been using wrist braces recently due to difficulty finding a proper fit. She has been using kinesio tape as an alternative.    She reports a history of vitamin D deficiency, with previous levels being extremely low. She has been taking vitamin D supplements but is unsure of her current levels. She also reports a decreased appetite recently.    She has a history of IBS, which has limited her ability to use certain medications like Mounjaro and Ozempic due to severe stomach pain. She has not gone through menopause, as her menstrual periods have returned and are occurring every 21 days.    5 point ROS negative except noted above  I had reviewed past medical and family histories together with allergy and medication lists documented.      Medications:  Current Outpatient Medications on File Prior to Visit   Medication Sig Dispense Refill    ergocalciferol 1.25 MG (25799 UT) Oral Cap Take 1 capsule (50,000 Units total) by mouth once a week. 12 capsule 0    ALLERGY RELIEF 180 MG Oral Tab Take 1 tablet (180 mg total) by mouth daily.      HUMALOG KWIKPEN 100 UNIT/ML Subcutaneous Solution Pen-injector USE IN THE EVENT OF PUMP FAILURE. INJECT 50 UNITS UNDER THE SKIN THREE TIMES DAILY BEFORE A MEAL.      nortriptyline 25 MG Oral Cap Take 1 capsule (25 mg total) by mouth nightly for 45 days, THEN 2 capsules (50 mg total) nightly. 135 capsule 1    Insulin Glargine, 2 Unit Dial, (TOUJEO MAX SOLOSTAR) 300 UNIT/ML Subcutaneous Solution Pen-injector Use in the event of pump failure- Inject 300 Units into the skin daily. 30 mL 1    insulin aspart (NOVOLOG FLEXPEN) 100 Units/mL Subcutaneous Solution Pen-injector Use in the event of pump failure. Take 50u three times daily before meals 45 mL 1    Mometasone Furo-Formoterol Fum (DULERA) 200-5 MCG/ACT Inhalation Aerosol Inhale 2 puffs into the  lungs 2 (two) times daily.      fluticasone propionate 50 MCG/ACT Nasal Suspension 1 spray by Nasal route 2 (two) times daily.      albuterol 108 (90 Base) MCG/ACT Inhalation Aero Soln Inhale 2 puffs into the lungs every 4 (four) hours as needed for Shortness of Breath.      GVOKE HYPOPEN 1-PACK 1 MG/0.2ML Subcutaneous SUBQ injection       SUMAtriptan 50 MG Oral Tab TAKE 1 TABLET BY MOUTH 1 TIME FOR UP TO 1 DOSE AS NEEDED FOR MIGRAINE      Lancets Does not apply Misc Use to test BG 2x/day 200 each 3    Continuous Glucose Sensor (DEXCOM G7 SENSOR) Does not apply Misc CHANGE 1 EVERY 10 DAYS (Patient not taking: Reported on 2/28/2025) 9 each 3    Insulin Disposable Pump (OMNIPOD 5 [id number] INTRO GEN 5) Does not apply Kit 1 each every third day. (Patient not taking: Reported on 2/28/2025) 1 kit 0    Insulin Disposable Pump (OMNIPOD 5 [id number] PODS GEN 5) Does not apply Misc 1 Device every 3 (three) days. (Patient not taking: Reported on 2/28/2025) 10 each 6    insulin regular human, conc, 500 UNIT/ML Subcutaneous Solution Pen-injector Use to fill insulin pump with up to 650u per day. 120 mL 1    Urine Glucose-Ketones Test In Vitro Strip Use as needed to test for ketones. (Patient not taking: Reported on 2/28/2025) 100 strip 1    Insulin Pen Needle (BD PEN NEEDLE GAVINO U/F) 32G X 4 MM Does not apply Misc Inject 1 Pen into the skin in the morning, at noon, in the evening, and at bedtime. (Patient not taking: Reported on 2/28/2025) 200 each 1    Glucose Blood (ONETOUCH VERIO) In Vitro Strip Use to test once daily as needed to calibrate dexcom. (Patient not taking: Reported on 2/28/2025) 90 strip 3     No current facility-administered medications on file prior to visit.   ?  Allergies:  Allergies   Allergen Reactions    Milk ANAPHYLAXIS    Penicillins HIVES    Aspirin RASH and NAUSEA AND VOMITING    Codeine NAUSEA AND VOMITING    Ibuprofen NAUSEA AND VOMITING    Latex RASH     Objective    Vitals:    05/16/25 1021   BP: 118/72    Pulse: 95   Resp: 16   Temp: 98.4 °F (36.9 °C)   SpO2: 97%   Weight: 206 lb 1.9 oz (93.5 kg)   Height: 4' 11\" (1.499 m)     GEN: NAD, well-nourished.   HEENT: Head: NCAT. Face: No lesions. Eyes: Conjunctiva clear.   PULM:  easy effort  Extremities: No cyanosis, edema or deformities.   Neurologic: Strength, CN2-12 grossly intact   Skin: No lesions or rashes.  MSK: 28 joint count performed. No evidence of synovitis in mcp, pip, dip, wrist, elbows, shoulders, hips, knees, ankles, mtp unless otherwise noted. Full ROM of elbows, wrists, knees.     Pain with exquisite, severe widespread allodynia  - Localized tenderness overlying the lumbar spine, bilateral lateral hips, left knee which has a mild knee effusion without any warmth or erythema  - There is significant tenderness overlying the bilateral Achilles tendon attachment to the posterior calcaneus and along the plantar fascia attachment to the distal calcaneus  - Mildly tender to palpation of the MTPs      ?  Labs:  4/2025  A1c 7.2  CBC W differential WNL  Creatinine 0.56, ALT 45, rest of CMP WNL  TSH 5.83  ACTH is low at 6.5, cortisol WNL  Vitamin D is 5.7    12/2024  vitamin D 5.9  CBC W differential WNL  Creatinine 0.57, AST 55, ALT 74  B12/folate/ferritin wnl    Lab Results   Component Value Date    WBC 9.7 12/08/2024    RBC 5.15 12/08/2024    HGB 14.1 12/08/2024    HCT 42.6 12/08/2024    .0 12/08/2024    MCV 82.7 12/08/2024    MCH 27.4 12/08/2024    MCHC 33.1 12/08/2024    RDW 15.2 12/08/2024    NEPRELIM 5.25 12/08/2024    NEPERCENT 54.0 12/08/2024    LYPERCENT 35.0 12/08/2024    MOPERCENT 7.3 12/08/2024    EOPERCENT 2.5 12/08/2024    BAPERCENT 0.8 12/08/2024    NE 5.25 12/08/2024    LYMABS 3.41 12/08/2024    MOABSO 0.71 12/08/2024    EOABSO 0.24 12/08/2024    BAABSO 0.08 12/08/2024     Lab Results   Component Value Date     (H) 04/12/2025    BUN 7 (L) 04/12/2025    BUNCREA 15.6 06/09/2021    CREATSERUM 0.68 04/12/2025    ANIONGAP 9 04/12/2025     GFRNAA 120 09/20/2021    GFRAA 139 09/20/2021    CA 9.4 04/12/2025    OSMOCALC 291 04/12/2025    ALKPHO 97 04/12/2025    AST 31 04/12/2025    ALT 50 (H) 04/12/2025    BILT 0.2 (L) 04/12/2025    TP 7.5 04/12/2025    ALB 4.6 04/12/2025    GLOBULIN 2.9 04/12/2025     04/12/2025    K 3.7 04/12/2025     04/12/2025    CO2 25.0 04/12/2025 6/2023  Sed rate 39  CRP 1.08  CBC W differential WNL  Creatinine 2.59, AST 90,   A1c 7.4    3/2023  CBC W differential WNL  Creatinine 0.64, AST 71, ,     11/20/2022  CK 58  Vitamin B12 675  Vitamin D 13.3  TSH 2.240  Ferritin 103.1  G6PD 14.9  HLA-B27 negative  ANCA, MPO/TN-3 negative  RADHIKA is negative  Uric acid 3.9  CCP, RF negative  IGRA is negative  Sed rate 15  CRP 0.91  CBC normal.       9/2022:  Comment: Positive:  Presence of C. trachomatis (by MICHAEL)   Chlamydia trachomatis RNA detected by PCR.    HIV, hepatitis B surface antigen, RPR negative    7/2022  WBC 12.9, hemoglobin 14.3, platelets 363  Creatinine 0.76, ALT 54, rest of CMP normal    Additional Labs:    Radiology:    Radiology review:      =====================================================================================================  Assessment and Plan    Assessment:  1. Fibromyalgia    2. Low vitamin D level    3. Bilateral foot pain    4. Chronic pain of both ankles    5. Chronic pain of both knees    6. Bilateral hip pain    7. Lumbar spine pain      #Lumbar spine pain  #Bilateral, left greater than right greater trochanteric bursitis  #Left greater than right knee osteoarthritis, knee pain  #Bilateral ankle/foot pain  - Severe fibromyalgia as a confounding factor to localize pain and understand etiology.  - Physical therapy with mild benefit that she continues to do    #Fibromyalgia:  --no effect with cymbalta, failed amitriptyline, gabapentin  -- Extremely low vitamin D noted repeat in the past.  Patient notes she has not been compliant until recently with vitamin D  repletion  --mild SOLE diagnosed recently, will be seeing sleep medicine soon.    #previously suspected seronegative RA:   -Prior medications: failed methotrexate, hydroxychloroquine (plaquenil), humira  -now off medications  -US hands negative in 2025 off all medications.    #Diabetes complicated by diabetic neuropathy: Primarily of the lower extremities.  #Bilateral carpal tunnel syndrome:   --11/2023 EMG/NCS with mild/moderate carpal tunnel syndrome  --improved previously overall with bracing.  Currently not using nocturnal bracing    High risk medication labs including CMP and CBC w/ diff reviewed from 4/2025; mild ALT elevation noted.  LFTs from 9/13/2023. Elevated AST/ALT persistently noted. noted.  9/2022: HIV, hep C, hepatitis B surface antigen is negative  11/2022: Hepatitis B core antibody total, IGRA negative    Plan:    Cannot take NSAIDs given possible liver issues and chronic nausea due to IBS.  She cannot use GLP-1 agonist because she had a flare of her IBS with this.  Told her she still needs to try to lose weight    Trial arthritis strength Tylenol, 2 pills in the morning and 2 pills 8 hours later.  Max 4 pills daily    Continue high-dose vitamin D supplementation.  Recheck vitamin D now to see that the level is rising.  Discussed that extremely low vitamin D could be playing a role in her widespread allodynia and fibromyalgia pain    X-rays of the bilateral foot/ankle/knees, x-rays of the hips and lumbar spine as well SOLE: see sleep medicine    PT; for fibromyalgia and greater trochanter bursitis    For fibromyalgia, diabetic neuropathy: continue nortriptyline 50 mg daily     Opioids not a good idea due to patient with gait instability and already a fall risk    Given lack of other suitable pain options, start pregabalin 75 mg capsule.   Try pregabalin 75 mg capsules  -take one capsule in the evening for 2 weeks, if doing ok then increase to one capsule twice daily    Restart carpal tunnel syndrome  braces. Go to [employer] [employer] and check out their selection  -wear this nightly      Refer to podiatry for foot and ankle pain    Signed utility paperwork noting that if her utilities were to be turned off her fibromyalgia and chronic pain would flare.    Rtc 6 months     Diagnoses and all orders for this visit:    Fibromyalgia  -     XR ANKLE (MIN 3 VIEWS), LEFT (CPT=73610); Future  -     XR ANKLE (MIN 3 VIEWS), RIGHT (CPT=73610); Future  -     XR FOOT, COMPLETE (MIN 3 VIEWS), LEFT (CPT=73630); Future  -     XR FOOT, COMPLETE (MIN 3 VIEWS), RIGHT (CPT=73630); Future  -     XR KNEE (3 VIEWS), RIGHT (CPT=73562); Future  -     XR KNEE (3 VIEWS), LEFT (CPT=73562); Future  -     XR LUMBAR SPINE (MIN 4 VIEWS) (CPT=72110); Future  -     pregabalin 75 MG Oral Cap; Take 1 capsule (75 mg total) by mouth 2 (two) times daily.    Low vitamin D level  -     Vitamin D; Future    Bilateral foot pain  -     XR FOOT, COMPLETE (MIN 3 VIEWS), LEFT (CPT=73630); Future  -     XR FOOT, COMPLETE (MIN 3 VIEWS), RIGHT (CPT=73630); Future  -     Podiatry Referral - In Network    Chronic pain of both ankles  -     XR ANKLE (MIN 3 VIEWS), LEFT (CPT=73610); Future  -     XR ANKLE (MIN 3 VIEWS), RIGHT (CPT=73610); Future  -     Podiatry Referral - In Network    Chronic pain of both knees  -     XR KNEE (3 VIEWS), RIGHT (CPT=73562); Future  -     XR KNEE (3 VIEWS), LEFT (CPT=73562); Future    Bilateral hip pain  -     XR HIP W OR WO PELVIS 3 OR 4 VIEWS, BILAT(CPT=73522); Future    Lumbar spine pain  -     XR LUMBAR SPINE (MIN 4 VIEWS) (CPT=72110); Future                    Return in about 6 months (around 11/16/2025).      The above plan of care, diagnosis, orders, and follow-up were discussed with the patient. Questions related to this recommended plan of care were answered.    Thank you for referring this delightful patient to me. Please feel free to contact me with any questions.     This report was performed utilizing speech recognition  software technology. Despite proofreading, speech recognition errors could escape detection. If a word or phrase is confusing or out of context, please do not hesitate to call for   clarification.       Kind regards      Trenton Fisher MD  [facility]

## 2025-05-20 ENCOUNTER — APPOINTMENT (OUTPATIENT)
Dept: PHYSICAL THERAPY | Facility: HOSPITAL | Age: 40
End: 2025-05-20
Attending: INTERNAL MEDICINE
Payer: MEDICAID

## 2025-05-20 DIAGNOSIS — E11.65 TYPE 2 DIABETES MELLITUS WITH HYPERGLYCEMIA, WITH LONG-TERM CURRENT USE OF INSULIN (HCC): ICD-10-CM

## 2025-05-20 DIAGNOSIS — Z79.4 TYPE 2 DIABETES MELLITUS WITH HYPERGLYCEMIA, WITH LONG-TERM CURRENT USE OF INSULIN (HCC): ICD-10-CM

## 2025-05-20 NOTE — TELEPHONE ENCOUNTER
Received fax from [employer] [facility] requesting refill for Humulin R U-500 Kwikpen.     Endocrine refill protocol for rapid acting, regular, intermediate, and mixed insulin:    Protocol Criteria:  PASSED Reason: N/A    If all below requirements are met, send a 90-day supply with 1 refill per provider protocol.    Verify appointment with Endocrinology completed in the last 6 months or scheduled in the next 3 months.  Verify A1C has been completed within the last 6 months and is below 8.5%    -May substitute prescriptions for Novolog and Humalog unless documented allergy (pens and vials) at the same dose and concentration per insurance preference and provider protocol.   -May substitute prescriptions for Novolin R and Humulin R unless documented allergy (pens and vials) at the same dose and concentration per insurance preference and provider protocol.   -May substitute prescriptions for Novolin N and Humulin N unless documented allergy (pens and vials) at the same dose and concentration per insurance preference and provider protocol.   -May substitute prescriptions for Humulin and Novolin 70/30 insulin unless documented allergy at the same dose and concentration per insurance preference and provider protocol.    Last completed office visit:8/27/2024 Caprice Shelby APN   Last completed telemed visit: 4/30/2025 Caprice Shelby APN  Next scheduled Follow up:   Future Appointments   Date Time Provider Department Center   6/7/2025  8:00 AM [facility] XR RM5 [facility]   6/7/2025  8:15 AM [facility] XR RM6 [facility]AL [facility]   6/7/2025  8:30 AM [facility] XR RM6 [facility]   6/7/2025  8:45 AM E[facility] XR RM6 [facility]   6/7/2025  9:00 AM EH XR RM5 GENERAL [facility]   6/7/2025  9:15 AM EH XR RM6 [facility]AL [facility]   6/7/2025  9:30 AM EH XR RM6 [facility]   6/7/2025 10:00 AM EH XR RM5 [facility]AL [facility]   8/15/2025 11:45 AM Caprice Shelby APN [facility]  [place]   9/9/2025 12:00 PM Luis Cole DO [facility] [place]   11/17/2025 10:15 AM Trenton Fisher MD [facility] [place]      Last A1C result: 7.2% done 4/12/2025.

## 2025-05-22 NOTE — TELEPHONE ENCOUNTER
Reviewed pump report. Using ~80u/day (400u in U500) in the last few days. Will fill for up to 600u/day as previously written was 650u/day (highly variable use, high resistance depending on meal size)

## 2025-05-24 ENCOUNTER — LAB ENCOUNTER (OUTPATIENT)
Dept: LAB | Facility: HOSPITAL | Age: 40
End: 2025-05-24
Payer: MEDICAID

## 2025-05-24 DIAGNOSIS — E03.8 SUBCLINICAL HYPOTHYROIDISM: ICD-10-CM

## 2025-05-24 DIAGNOSIS — R79.89 LOW VITAMIN D LEVEL: ICD-10-CM

## 2025-05-24 LAB
T4 FREE SERPL-MCNC: 1.1 NG/DL (ref 0.8–1.7)
THYROGLOB SERPL-MCNC: 20 U/ML (ref ?–60)
THYROPEROXIDASE AB SERPL-ACNC: 36 U/ML (ref ?–60)
TSI SER-ACNC: 2.8 UIU/ML (ref 0.55–4.78)
VIT D+METAB SERPL-MCNC: 7 NG/ML (ref 30–100)

## 2025-05-24 PROCEDURE — 86376 MICROSOMAL ANTIBODY EACH: CPT

## 2025-05-24 PROCEDURE — 82306 VITAMIN D 25 HYDROXY: CPT

## 2025-05-24 PROCEDURE — 84443 ASSAY THYROID STIM HORMONE: CPT

## 2025-05-24 PROCEDURE — 84439 ASSAY OF FREE THYROXINE: CPT

## 2025-05-24 PROCEDURE — 86800 THYROGLOBULIN ANTIBODY: CPT

## 2025-05-24 PROCEDURE — 36415 COLL VENOUS BLD VENIPUNCTURE: CPT

## 2025-05-25 ENCOUNTER — TELEPHONE (OUTPATIENT)
Facility: CLINIC | Age: 40
End: 2025-05-25

## 2025-05-25 DIAGNOSIS — R79.89 LOW VITAMIN D LEVEL: Primary | ICD-10-CM

## 2025-05-25 RX ORDER — ERGOCALCIFEROL 1.25 MG/1
50000 CAPSULE, LIQUID FILLED ORAL
Qty: 26 CAPSULE | Refills: 1 | Status: SHIPPED | OUTPATIENT
Start: 2025-05-25 | End: 2025-08-24

## 2025-06-07 ENCOUNTER — HOSPITAL ENCOUNTER (OUTPATIENT)
Dept: GENERAL RADIOLOGY | Facility: HOSPITAL | Age: 40
Discharge: HOME | End: 2025-06-07
Attending: INTERNAL MEDICINE
Payer: MEDICAID

## 2025-06-07 DIAGNOSIS — M79.7 FIBROMYALGIA: ICD-10-CM

## 2025-06-07 DIAGNOSIS — M25.561 CHRONIC PAIN OF BOTH KNEES: ICD-10-CM

## 2025-06-07 DIAGNOSIS — M25.552 BILATERAL HIP PAIN: ICD-10-CM

## 2025-06-07 DIAGNOSIS — M79.671 BILATERAL FOOT PAIN: ICD-10-CM

## 2025-06-07 DIAGNOSIS — G89.29 CHRONIC PAIN OF BOTH KNEES: ICD-10-CM

## 2025-06-07 DIAGNOSIS — G89.29 CHRONIC PAIN OF BOTH ANKLES: ICD-10-CM

## 2025-06-07 DIAGNOSIS — M79.672 BILATERAL FOOT PAIN: ICD-10-CM

## 2025-06-07 DIAGNOSIS — M25.551 BILATERAL HIP PAIN: ICD-10-CM

## 2025-06-07 DIAGNOSIS — M25.571 CHRONIC PAIN OF BOTH ANKLES: ICD-10-CM

## 2025-06-07 DIAGNOSIS — M25.562 CHRONIC PAIN OF BOTH KNEES: ICD-10-CM

## 2025-06-07 DIAGNOSIS — M25.572 CHRONIC PAIN OF BOTH ANKLES: ICD-10-CM

## 2025-06-07 DIAGNOSIS — M54.50 LUMBAR SPINE PAIN: ICD-10-CM

## 2025-06-07 PROCEDURE — 73630 X-RAY EXAM OF FOOT: CPT | Performed by: INTERNAL MEDICINE

## 2025-06-07 PROCEDURE — 73610 X-RAY EXAM OF ANKLE: CPT | Performed by: INTERNAL MEDICINE

## 2025-06-07 PROCEDURE — 72110 X-RAY EXAM L-2 SPINE 4/>VWS: CPT | Performed by: INTERNAL MEDICINE

## 2025-06-07 PROCEDURE — 73523 X-RAY EXAM HIPS BI 5/> VIEWS: CPT | Performed by: INTERNAL MEDICINE

## 2025-06-07 PROCEDURE — 73562 X-RAY EXAM OF KNEE 3: CPT | Performed by: INTERNAL MEDICINE

## 2025-06-10 ENCOUNTER — OFFICE VISIT (OUTPATIENT)
Facility: CLINIC | Age: 40
End: 2025-06-10
Payer: MEDICAID

## 2025-06-10 VITALS
HEIGHT: 59 IN | HEART RATE: 89 BPM | DIASTOLIC BLOOD PRESSURE: 72 MMHG | WEIGHT: 208 LBS | OXYGEN SATURATION: 98 % | RESPIRATION RATE: 18 BRPM | SYSTOLIC BLOOD PRESSURE: 118 MMHG | BODY MASS INDEX: 41.93 KG/M2

## 2025-06-10 DIAGNOSIS — Z79.4 TYPE 2 DIABETES MELLITUS WITH HYPERGLYCEMIA, WITH LONG-TERM CURRENT USE OF INSULIN (HCC): ICD-10-CM

## 2025-06-10 DIAGNOSIS — E66.813 CLASS 3 SEVERE OBESITY DUE TO EXCESS CALORIES WITHOUT SERIOUS COMORBIDITY WITH BODY MASS INDEX (BMI) OF 40.0 TO 44.9 IN ADULT: ICD-10-CM

## 2025-06-10 DIAGNOSIS — J35.1 TONSILLAR HYPERTROPHY: ICD-10-CM

## 2025-06-10 DIAGNOSIS — E11.65 TYPE 2 DIABETES MELLITUS WITH HYPERGLYCEMIA, WITH LONG-TERM CURRENT USE OF INSULIN (HCC): ICD-10-CM

## 2025-06-10 DIAGNOSIS — G47.33 OBSTRUCTIVE SLEEP APNEA: Primary | ICD-10-CM

## 2025-06-10 DIAGNOSIS — F51.04 PSYCHOPHYSIOLOGICAL INSOMNIA: ICD-10-CM

## 2025-06-10 DIAGNOSIS — G47.10 HYPERSOMNIA: ICD-10-CM

## 2025-06-10 PROCEDURE — 99205 OFFICE O/P NEW HI 60 MIN: CPT | Performed by: OTHER

## 2025-06-10 RX ORDER — LEVOCETIRIZINE DIHYDROCHLORIDE 5 MG/1
TABLET, FILM COATED ORAL
COMMUNITY
Start: 2025-06-09

## 2025-06-10 NOTE — PROGRESS NOTES
[facility] PULMONARY  SLEEP PROGRESS NOTE        HPI:     40 year old female coming in for   Chief Complaint   Patient presents with    Obstructive Sleep Apnea (SOLE)     Pt here for sleep consult, sleep questioner 5/24       HPI:   Does not sleep well  Can't fall asleep  Can't stay asleep  Ongoing for 2 years  Mother passed away in may--covid  She did live with her   Has tried nortryptilline    Goes to bed around 630-7pm she is in pain  Goes to bed but can't sleep 1-4am  Up by 8-9 due to work  May take naps usually 4 hour naps    No caffeine  Snores  Witnessed apnea  Wakes choking    No reflux  Poor nasal breathing  No children    No smoking  Quit 2021    H/o diabetes 2  Migraines since age 13    Felt she did not fall asleep until 12am, feels she may have slept 3-4 hours that night  [facility]       Accredited by the American Academy of Sleep Medicine (AASM)     PATIENT'S NAME:        ZAKIA MARTINEZ  ATTENDING PHYSICIAN:   Luis Cole DO  REFERRING PHYSICIAN:   Trenton Fisher MD  PATIENT ACCOUNT #:     [id number]        LOCATION:       [facility]  MEDICAL RECORD #:      [id number]        YOB: 1985  DATE OF STUDY:         02/01/2025     SLEEP STUDY REPORT     STUDY TYPE:  Unattended sleep study.     CLINICAL HISTORY:  This is a 39-year-old female with a body mass index of 39, undergoing evaluation due to a report of snoring, witnessed apnea, daytime fatigue, and hypersomnia.  This is a report of her home sleep test.     UNATTENDED SLEEP STUDY RECORDING PARAMETERS:  The patient underwent a formal technically adequate unattended diagnostic sleep study coordinated with the [facility].  The study was performed in accordance with the AASM standard for Out of Center Sleep Testing.  The four-channel Type III HST measures the following parameters:  flow, respiratory effort, pulse, and oxygen saturation.     SCORING:  This study was scored in accordance with AASM scoring rules and  Medicare rule 1B.     FINDINGS:  The flow evaluation recording time began at 10:35 p.m. and ended at 8:06 a.m., for a duration of 9 hours and 30 minutes.  Light snoring was recorded.  There were 62 hypopneas and 4 apneas for an overall apnea-hypopnea index of 6.9.  The SaO2 maximiliano was 87%.  Total time spent with oxyhemoglobin saturations less than 88% was 0 minutes.  Average  pulse was 79.  Events did cluster in a REM pattern.       IMPRESSION:  Overall mild obstructive sleep apnea.  Overall AHI is 6.9.  SaO2 maximiliano is 87%.     DIAGNOSIS:  Obstructive sleep apnea.     RECOMMENDATIONS:    1.       At the request of the referring physician, we will confer with the patient regarding the results of the study and make treatment recommendations.   2.       Patient is a candidate for nasal CPAP, oral appliance therapy, and evaluation of the upper airway by ear, nose, throat specialist.   3.       Weight loss would likely have an ameliorating effect on the degree of sleep-disordered breathing identified, and weight loss is advised as appropriate.   4.       If daytime sleepiness is a complaint, the patient needs to understand potential dangers associated with reduced daytime vigilance.   5.       Care should be used with the administration of anesthetic and sedative agents, and alcohol should be avoided.   6.       CPAP titration should be considered.      Patient: Sleep review of systems today: see form.      Pt  PCP:  Bella Hwoard MD  No referring provider defined for this encounter.           No data to display                    Past Medical History[1]  Past Surgical History[2]  Social History:  Social History     Social History Narrative    Not on file     Short Social Hx on File[3]  Family History:  Family History[4]  Allergies:  Allergies[5]  Current Meds:  Current Medications[6]   Counseling given: Not Answered         Problem List:  Problem List[7]    REVIEW OF SYSTEMS:   Review of Systems    EXAM:   /72 (BP  Location: Left arm, Patient Position: Sitting, Cuff Size: adult)   Pulse 89   Resp 18   Ht 4' 11\" (1.499 m)   Wt 208 lb (94.3 kg)   SpO2 98%   BMI 42.01 kg/m²  Estimated body mass index is 42.01 kg/m² as calculated from the following:    Height as of this encounter: 4' 11\" (1.499 m).    Weight as of this encounter: 208 lb (94.3 kg).   Neck in inches:      Wt Readings from Last 6 Encounters:   06/10/25 208 lb (94.3 kg)   05/16/25 206 lb 1.9 oz (93.5 kg)   08/30/24 197 lb 1.9 oz (89.4 kg)   08/27/24 198 lb 9.6 oz (90.1 kg)   07/19/24 195 lb (88.5 kg)   05/01/24 195 lb (88.5 kg)     BP Readings from Last 3 Encounters:   06/10/25 118/72   05/16/25 118/72   08/30/24 136/86     Pulse Readings from Last 3 Encounters:   06/10/25 89   05/16/25 95   08/30/24 107     SpO2 Readings from Last 3 Encounters:   06/10/25 98%   05/16/25 97%   08/30/24 96%      Patient must be at least 60 in tall to calculate ideal body weight    Vital signs reviewed.  Physical Exam  Malim 4, tonsils 3+ on right, 4 on left  ASSESSMENT AND PLAN:   1. Obstructive sleep apnea  On HST severe in REM, overall mild  Hypertrophied tonsils  Willing to try cpap ,  Reevaluate insomnia after cpap application and acclimation    2. Class 3 severe obesity due to excess calories without serious comorbidity with body mass index (BMI) of 40.0 - 44.9 in adult  BMI 42, did not tolerate munjaro due to GI SE  3. Hypersomnia    4. Psychophysiological insomnia    5. DM    6. Migraine  Severe 2-3 times a month, headaches 2-3 times a week  7. Tonsillar hypertrophy  Consult ENT    There are no Patient Instructions on file for this visit.    Independent interpretation of Sleep Download as defined above.  Continue with Rx management of Sleep apnea with PAP therapy.    COMPLIANCE is required by insurance for 4 hours a night most nights of the week.    Advised if still with sleep apnea and not using CPAP has a 7 fold increase in risk of heart attack, stroke, abnormal heart  rhythm  and death,  increased risk of driving accidents.     Advised to refrain from driving when sleepy.      Recommend weight loss, and maintain and optimal BMI with Exercise 30 minutes most days to target heart rate .     Advised patient to change filters,masks,hoses  and tubes and equiptment on a  regular schedule.    Filters and seals shall be changed every 1 month,  Hoses every 3 months,   CPAP mask and humidifier chamber changed every 6 month  with the durable medical equipment provider.         Meds & Refills for this Visit:  Requested Prescriptions      No prescriptions requested or ordered in this encounter       Outcome: Parent verbalizes understanding. Parent is notified to call with any questions, complications, allergies, or worsening or changing symptoms.  Parent is to call with any side effects or complications from the treatments as a result of today.     \" This note was created utilizing Dragon speech recognition software.  Please excuse any grammatical errors. Call my office if you have any questions regarding this note. \"     Luis Cole, DO  6/10/2025  10:13 AM         [1]   Past Medical History:   Arthritis    Asthma (HCC)    Diabetes (HCC)    Diabetes mellitus (HCC)   [2]   Past Surgical History:  Procedure Laterality Date    Other      torn ligament and tendon to left ankle.    [3]   Social History  Socioeconomic History    Marital status:    Tobacco Use    Smoking status: Former     Current packs/day: 0.00     Average packs/day: 0.5 packs/day for 15.0 years (7.5 ttl pk-yrs)     Types: Cigarettes     Start date: 12/24/2006     Quit date: 12/24/2021     Years since quitting: 3.4    Smokeless tobacco: Never   Vaping Use    Vaping status: Never Used   Substance and Sexual Activity    Alcohol use: Not Currently    Drug use: Not Currently     Social Drivers of Health     Food Insecurity: High Risk (4/14/2025)    Received from [facility]    Food Insecurity     Have  there been times that your food ran out, and you didn't have money to get more?: Yes   Transportation Needs: Low Risk  (4/14/2025)    Received from [facility]    Transportation Needs     Do you have trouble getting transportation to medical appointments?: No   Stress: Not on File (10/6/2022)    Received from [facility]    Stress     Stress: 0   Housing Stability: Low Risk  (4/14/2025)    Received from [facility]    Housing Stability     Are you worried that your electric, gas, oil, or water might be shut off?: No     Are you concerned about having a safe and reliable place to live?: No   [4]   Family History  Problem Relation Age of Onset    No Known Problems Father     Diabetes Mother     Hypertension Mother     Other (hyperthoyrid) Mother    [5]   Allergies  Allergen Reactions    Milk ANAPHYLAXIS    Penicillins HIVES    Aspirin RASH and NAUSEA AND VOMITING    Codeine NAUSEA AND VOMITING    Ibuprofen NAUSEA AND VOMITING    Latex RASH   [6]   Current Outpatient Medications   Medication Sig Dispense Refill    levocetirizine 5 MG Oral Tab       ergocalciferol 1.25 MG (38340 UT) Oral Cap Take 1 capsule (50,000 Units total) by mouth twice a week. 26 capsule 1    insulin regular human, conc, 500 UNIT/ML Subcutaneous Solution Pen-injector Use to fill insulin pump with up to 600u per day. 108 mL 2    atorvastatin 10 MG Oral Tab Take 1 tablet (10 mg total) by mouth nightly.      pregabalin 75 MG Oral Cap Take 1 capsule (75 mg total) by mouth 2 (two) times daily. 180 capsule 1    ergocalciferol 1.25 MG (53367 UT) Oral Cap Take 1 capsule (50,000 Units total) by mouth once a week. 12 capsule 0    ALLERGY RELIEF 180 MG Oral Tab Take 1 tablet (180 mg total) by mouth daily.      HUMALOG KWIKPEN 100 UNIT/ML Subcutaneous Solution Pen-injector USE IN THE EVENT OF PUMP FAILURE. INJECT 50 UNITS UNDER THE SKIN THREE TIMES DAILY BEFORE A MEAL.      Continuous Glucose Sensor (DEXCOM G7 SENSOR) Does  not apply Misc CHANGE 1 EVERY 10 DAYS 9 each 3    Insulin Disposable Pump (OMNIPOD 5 [id number] INTRO GEN 5) Does not apply Kit 1 each every third day. 1 kit 0    Insulin Disposable Pump (OMNIPOD 5 [id number] PODS GEN 5) Does not apply Misc 1 Device every 3 (three) days. 10 each 6    Insulin Glargine, 2 Unit Dial, (TOUJEO MAX SOLOSTAR) 300 UNIT/ML Subcutaneous Solution Pen-injector Use in the event of pump failure- Inject 300 Units into the skin daily. 30 mL 1    Insulin Pen Needle (BD PEN NEEDLE GAVINO U/F) 32G X 4 MM Does not apply Misc Inject 1 Pen into the skin in the morning, at noon, in the evening, and at bedtime. 200 each 1    Mometasone Furo-Formoterol Fum (DULERA) 200-5 MCG/ACT Inhalation Aerosol Inhale 2 puffs into the lungs 2 (two) times daily.      fluticasone propionate 50 MCG/ACT Nasal Suspension 1 spray by Nasal route 2 (two) times daily.      albuterol 108 (90 Base) MCG/ACT Inhalation Aero Soln Inhale 2 puffs into the lungs every 4 (four) hours as needed for Shortness of Breath.      GVOKE HYPOPEN 1-PACK 1 MG/0.2ML Subcutaneous SUBQ injection       SUMAtriptan 50 MG Oral Tab TAKE 1 TABLET BY MOUTH 1 TIME FOR UP TO 1 DOSE AS NEEDED FOR MIGRAINE      Lancets Does not apply Misc Use to test BG 2x/day 200 each 3    Urine Glucose-Ketones Test In Vitro Strip Use as needed to test for ketones. (Patient not taking: Reported on 2/28/2025) 100 strip 1    Glucose Blood (ONETOUCH VERIO) In Vitro Strip Use to test once daily as needed to calibrate dexcom. (Patient not taking: Reported on 2/28/2025) 90 strip 3   [7]   Patient Active Problem List  Diagnosis    Inflammatory arthritis    Therapeutic drug monitoring    Rheumatoid arthritis of multiple sites with negative rheumatoid factor (HCC)    Type 2 diabetes mellitus with hyperglycemia, with long-term current use of insulin (HCC)    Anxiety    Asthma (HCC)    Ulcerative colitis (HCC)    Bilateral carpal tunnel syndrome    Obesity (BMI 30-39.9)    Elevated liver enzymes     Elevated cholesterol with elevated triglycerides    Abnormal uterine bleeding (AUB)    Acute cough    Fibromyalgia    Diabetic neuropathy (HCC)    Obstructive sleep apnea    Class 3 severe obesity due to excess calories without serious comorbidity with body mass index (BMI) of 40.0 to 44.9 in adult    Hypersomnia    Psychophysiological insomnia

## 2025-06-12 ENCOUNTER — TELEPHONE (OUTPATIENT)
Facility: CLINIC | Age: 40
End: 2025-06-12

## 2025-06-12 DIAGNOSIS — G89.29 CHRONIC PAIN OF RIGHT ANKLE: Primary | ICD-10-CM

## 2025-06-12 DIAGNOSIS — M25.571 CHRONIC PAIN OF RIGHT ANKLE: Primary | ICD-10-CM

## 2025-06-12 DIAGNOSIS — G89.29 CHRONIC PAIN OF LEFT ANKLE: ICD-10-CM

## 2025-06-12 DIAGNOSIS — M25.572 CHRONIC PAIN OF LEFT ANKLE: ICD-10-CM

## 2025-07-04 ENCOUNTER — PATIENT MESSAGE (OUTPATIENT)
Facility: CLINIC | Age: 40
End: 2025-07-04

## 2025-07-07 NOTE — TELEPHONE ENCOUNTER
Sent my chart message to patient to help get set up. Supplied number for Omnipod with any questions.

## 2025-07-10 ENCOUNTER — OFFICE VISIT (OUTPATIENT)
Facility: LOCATION | Age: 40
End: 2025-07-10
Payer: MEDICAID

## 2025-07-10 DIAGNOSIS — G47.30 SLEEP-DISORDERED BREATHING: Primary | ICD-10-CM

## 2025-07-10 DIAGNOSIS — Z79.4 TYPE 2 DIABETES MELLITUS WITH HYPERGLYCEMIA, WITH LONG-TERM CURRENT USE OF INSULIN (HCC): ICD-10-CM

## 2025-07-10 DIAGNOSIS — J34.3 HYPERTROPHY, NASAL, TURBINATE: ICD-10-CM

## 2025-07-10 DIAGNOSIS — J35.3 HYPERTROPHY OF TONSILS AND ADENOIDS: ICD-10-CM

## 2025-07-10 DIAGNOSIS — E11.65 TYPE 2 DIABETES MELLITUS WITH HYPERGLYCEMIA, WITH LONG-TERM CURRENT USE OF INSULIN (HCC): ICD-10-CM

## 2025-07-10 PROCEDURE — 31575 DIAGNOSTIC LARYNGOSCOPY: CPT | Performed by: OTOLARYNGOLOGY

## 2025-07-10 PROCEDURE — 99204 OFFICE O/P NEW MOD 45 MIN: CPT | Performed by: OTOLARYNGOLOGY

## 2025-07-10 RX ORDER — INSULIN PMP CART,AUT,G6/7,CNTR
1 EACH SUBCUTANEOUS
Qty: 10 EACH | Refills: 5 | Status: SHIPPED | OUTPATIENT
Start: 2025-07-10

## 2025-07-10 NOTE — TELEPHONE ENCOUNTER
Received fax from [employer] requesting refill for Omnipod 5 pods.    Endocrine Refill protocol for Omnipod insulin pumps and supplies    Protocol Criteria:  PASSED  Reason: N/A    If below requirement is met, send a 90-day supply with 1 refill per provider protocol.    Verify appointment with Endocrinology completed in the last 6 months or scheduled in the next 3 months.    Last completed office visit:8/27/2024 Caprice Shelby APN   Last completed telemed visit: 4/30/2025 Caprice Shelby APN  Next scheduled Follow up:   Future Appointments   Date Time Provider Department Center   8/15/2025 11:45 AM Caprice Shelby APN [facility]   8/15/2025 10:00 PM [facility]   11/17/2025 10:15 AM Trenton Fisher MD [facility] [place]

## 2025-07-10 NOTE — PROGRESS NOTES
Keke Dumont is a 40 year old female.   Chief Complaint   Patient presents with    Tonsil Problem     HPI:   She was diagnosed with mild sleep apnea.  She has difficulty sleeping.  She has chronic nasal obstruction she is a mouth breather.  She has allergies and asthma.  She has tried nasal sprays for quite some time without relief.  She also has enlarged tonsils and chronic sore throats.  Current Medications[1]   Past Medical History[2]   Social History:  Short Social Hx on File[3]   Past Surgical History[4]      REVIEW OF SYSTEMS:   GENERAL HEALTH: feels well otherwise  GENERAL : denies fever, chills, sweats, weight loss, weight gain  SKIN: denies any unusual skin lesions or rashes  RESPIRATORY: denies shortness of breath with exertion  NEURO: denies headaches    EXAM:   There were no vitals taken for this visit.    System Findings Details   Constitutional  Overall appearance - Normal.   Psychiatric  Orientation - Oriented to time, place, person & situation. Appropriate mood and affect.   Head/Face  Facial features -- Normal. Skull - Normal.   Eyes  Pupils equal ,round ,react to light and accomidate   Ears, Nose, Throat, Neck  Nasal congestion with turbinate hypertrophy oropharynx very large almost +4/4 tonsils touching in the midline neck no masses   Neurological  Memory - Normal. Cranial nerves - Cranial nerves II through XII grossly intact.   Lymph Detail  Submental. Submandibular. Anterior cervical. Posterior cervical. Supraclavicular.     Flexible Laryngoscopy Procedure Note (72931)    Due to inability for adequate examination of the larynx and need for magnification to perform the examination, endoscopy was performed.  Risks and benefits were discussed with patient/family and they have given verbal consent to proceed.    Pre-operative Diagnosis:   1. Sleep-disordered breathing    2. Hypertrophy, nasal, turbinate    3. Hypertrophy of tonsils and adenoids        Post-operative Diagnosis: Same    Procedure:  Diagnostic flexible fiberoptic laryngoscopy    Anesthesia: topical lidocaine    Surgeon: Trevin Brown MD    EBL: 0cc    Procedure Detail & Findings: Turbinate hypertrophy present.  There is a deviated septum.  There is very large tonsils.  Epiglottis and vocal cords are normal    Condition: Stable    Complications: Patient tolerated the procedure well with no immediate complications.      Trevin Brown MD    ASSESSMENT AND PLAN:   1. Sleep-disordered breathing  She has mild obstructive sleep apnea syndrome.  Sleep study was reviewed.  She also has chronic nasal obstruction with allergies and turbinate hypertrophy.  She has very large tonsils blocking her airway.  We have discussed CPAP therapy as initial treatment versus surgical intervention.  Understanding the risks and benefits of both she would like to proceed with surgery which would include turbinectomy and tonsillectomy.The risk benefits and alternatives were explained to the patient and/or parents.  The risks are to include not limited to bleeding infection recurrent bleeding which could be serious velopharyngeal insufficiency and nonresolution of symptoms.  She understands that this may not cure her sleep apnea and she may need further intervention including the possibility of CPAP therapy.  She will see her doctor or pulmonary medicine prior to surgery given her asthma for clearance.    2. Hypertrophy, nasal, turbinate      3. Hypertrophy of tonsils and adenoids        The patient indicates understanding of these issues and agrees to the plan.    No follow-ups on file.    Trevin Brown MD  7/10/2025  2:34 PM       [1]   Current Outpatient Medications   Medication Sig Dispense Refill    levocetirizine 5 MG Oral Tab       ergocalciferol 1.25 MG (27419 UT) Oral Cap Take 1 capsule (50,000 Units total) by mouth twice a week. 26 capsule 1    insulin regular human, conc, 500 UNIT/ML Subcutaneous Solution Pen-injector Use to fill insulin pump with up  to 600u per day. 108 mL 2    atorvastatin 10 MG Oral Tab Take 1 tablet (10 mg total) by mouth nightly.      pregabalin 75 MG Oral Cap Take 1 capsule (75 mg total) by mouth 2 (two) times daily. 180 capsule 1    Lancets Does not apply Misc Use to test BG 2x/day 200 each 3    ALLERGY RELIEF 180 MG Oral Tab Take 1 tablet (180 mg total) by mouth daily.      HUMALOG KWIKPEN 100 UNIT/ML Subcutaneous Solution Pen-injector USE IN THE EVENT OF PUMP FAILURE. INJECT 50 UNITS UNDER THE SKIN THREE TIMES DAILY BEFORE A MEAL.      Continuous Glucose Sensor (DEXCOM G7 SENSOR) Does not apply Misc CHANGE 1 EVERY 10 DAYS 9 each 3    Insulin Disposable Pump (OMNIPOD 5 [id number] INTRO GEN 5) Does not apply Kit 1 each every third day. 1 kit 0    Insulin Disposable Pump (OMNIPOD 5 [id number] PODS GEN 5) Does not apply Misc 1 Device every 3 (three) days. 10 each 6    Insulin Glargine, 2 Unit Dial, (TOUJEO MAX SOLOSTAR) 300 UNIT/ML Subcutaneous Solution Pen-injector Use in the event of pump failure- Inject 300 Units into the skin daily. 30 mL 1    Urine Glucose-Ketones Test In Vitro Strip Use as needed to test for ketones. (Patient not taking: Reported on 2/28/2025) 100 strip 1    Insulin Pen Needle (BD PEN NEEDLE GAVINO U/F) 32G X 4 MM Does not apply Misc Inject 1 Pen into the skin in the morning, at noon, in the evening, and at bedtime. 200 each 1    Mometasone Furo-Formoterol Fum (DULERA) 200-5 MCG/ACT Inhalation Aerosol Inhale 2 puffs into the lungs 2 (two) times daily.      fluticasone propionate 50 MCG/ACT Nasal Suspension 1 spray by Nasal route 2 (two) times daily.      albuterol 108 (90 Base) MCG/ACT Inhalation Aero Soln Inhale 2 puffs into the lungs every 4 (four) hours as needed for Shortness of Breath.      Glucose Blood (ONETOUCH VERIO) In Vitro Strip Use to test once daily as needed to calibrate dexcom. (Patient not taking: Reported on 2/28/2025) 90 strip 3    GVOKE HYPOPEN 1-PACK 1 MG/0.2ML Subcutaneous SUBQ injection        SUMAtriptan 50 MG Oral Tab TAKE 1 TABLET BY MOUTH 1 TIME FOR UP TO 1 DOSE AS NEEDED FOR MIGRAINE     [2]   Past Medical History:   Arthritis    Asthma (HCC)    Diabetes (HCC)    Diabetes mellitus (HCC)   [3]   Social History  Socioeconomic History    Marital status:    Tobacco Use    Smoking status: Former     Current packs/day: 0.00     Average packs/day: 0.5 packs/day for 15.0 years (7.5 ttl pk-yrs)     Types: Cigarettes     Start date: 12/24/2006     Quit date: 12/24/2021     Years since quitting: 3.5    Smokeless tobacco: Never   Vaping Use    Vaping status: Never Used   Substance and Sexual Activity    Alcohol use: Not Currently    Drug use: Not Currently     Social Drivers of Health     Food Insecurity: No Food Insecurity (7/1/2025)    Received from [facility]    Food Insecurity     Currently or in the past 3 months, have you worried your food would run out before you had money to buy more?: No     In the past 12 months, have you run out of food or been unable to get more?: No   Recent Concern: Food Insecurity - High Risk (4/14/2025)    Received from [facility]    Food Insecurity     Have there been times that your food ran out, and you didn't have money to get more?: Yes   Transportation Needs: No Transportation Needs (7/1/2025)    Received from [facility]    Transportation Needs     Currently or in the past 3 months, has lack of transportation kept you from medical appointments, getting food or medicine, or providing care to a family member?: No   Stress: Not on File (10/6/2022)    Received from [facility]    Stress     Stress: 0   Housing Stability: Low Risk  (4/14/2025)    Received from [facility]    Housing Stability     Are you worried that your electric, gas, oil, or water might be shut off?: No     Are you concerned about having a safe and reliable place to live?: No   [4]   Past Surgical History:  Procedure  Laterality Date    Other      torn ligament and tendon to left ankle.

## 2025-07-16 ENCOUNTER — TELEPHONE (OUTPATIENT)
Facility: LOCATION | Age: 40
End: 2025-07-16

## 2025-07-16 DIAGNOSIS — J34.3 HYPERTROPHY OF NASAL TURBINATES: Primary | ICD-10-CM

## 2025-07-16 DIAGNOSIS — J35.01 CHRONIC TONSILLITIS: ICD-10-CM

## 2025-07-16 NOTE — TELEPHONE ENCOUNTER
Scheduled surgery for 09/25/2025                                                                                                                                                                                                                                                                                                                                                                                                                                                                                                                                                                                                                                                                           Scheduled surgery for 09/25/2025 virgil

## 2025-07-21 ENCOUNTER — PATIENT MESSAGE (OUTPATIENT)
Facility: CLINIC | Age: 40
End: 2025-07-21

## 2025-07-21 NOTE — TELEPHONE ENCOUNTER
RN phoned patient and reviewed, states she is dropping low daily. RN reschedule visit for Friday 7/25    [facility] report downloaded and given to provider. Rescheduled patient for 7/25 visit    HYPOGLYCEMIA TRIAGE    RN spoke with patient regarding current reported HYPOglycemia.     When did the more frequent hypoglycemia start? Ongoing    Has the patient recently STOPPED steroid treatment? No    RN confirmed patient is currently taking all of the below listed diabetes medications:  Diabetic Medications              insulin regular human, conc, 500 UNIT/ML Subcutaneous Solution Pen-injector Use to fill insulin pump with up to 600u per day.    HUMALOG KWIKPEN 100 UNIT/ML Subcutaneous Solution Pen-injector USE IN THE EVENT OF PUMP FAILURE. INJECT 50 UNITS UNDER THE SKIN THREE TIMES DAILY BEFORE A MEAL.    Insulin Glargine, 2 Unit Dial, (TOUJEO MAX SOLOSTAR) 300 UNIT/ML Subcutaneous Solution Pen-injector Use in the event of pump failure- Inject 300 Units into the skin daily.    GVOKE HYPOPEN 1-PACK 1 MG/0.2ML Subcutaneous SUBQ injection               How is patient checking blood glucose? Dexcom CGM     If using Glucometer:  Not applicable, patient using CGM     Has patient used their glucagon to treat a recent low? : No  Has patient called 911 for a recent low? No    Is the low symptomatic? Yes; comment: just feeling off      Routing to provider for review.     Petr CARROLL RN  7/21/2025    arsymptoms

## 2025-07-22 NOTE — TELEPHONE ENCOUNTER
Reviewed nitino-- lows early AM  On U500 might be related to evening boluses  Appointment scheduled for Friday, my chart message sent. Closing encounter.

## 2025-07-25 ENCOUNTER — TELEMEDICINE (OUTPATIENT)
Facility: CLINIC | Age: 40
End: 2025-07-25
Payer: MEDICAID

## 2025-07-25 DIAGNOSIS — R79.89 HIGH SERUM TESTOSTERONE: ICD-10-CM

## 2025-07-25 DIAGNOSIS — Z79.4 TYPE 2 DIABETES MELLITUS WITH HYPERGLYCEMIA, WITH LONG-TERM CURRENT USE OF INSULIN (HCC): Primary | ICD-10-CM

## 2025-07-25 DIAGNOSIS — E11.65 TYPE 2 DIABETES MELLITUS WITH HYPERGLYCEMIA, WITH LONG-TERM CURRENT USE OF INSULIN (HCC): Primary | ICD-10-CM

## 2025-07-25 PROCEDURE — 95251 CONT GLUC MNTR ANALYSIS I&R: CPT

## 2025-07-25 PROCEDURE — 99215 OFFICE O/P EST HI 40 MIN: CPT

## 2025-07-25 RX ORDER — INSULIN GLARGINE 300 U/ML
INJECTION, SOLUTION SUBCUTANEOUS
COMMUNITY
Start: 2025-07-25

## 2025-07-25 NOTE — PATIENT INSTRUCTIONS
Return Visit:  With THEO Treadwell: Return in about 6 weeks (around 9/5/2025) for in person, will make preop plans at that visit.         --> I have ordered a test called the dexamethasone suppression test.  This test will look for an abnormality in cortisol production.  Please read this instruction carefully.  We have sent a prescription for a pill called dexamethasone 1 mg to your pharmacy, please pick this up at your pharmacy.  You will need to take this pill between 11 pm and midnight the night before your blood test.  The next morning please proceed to the lab to have your serum cortisol level drawn between 7 and 8 am.  Timing is very important for this test.  If the pill is not taken on time or the blood work is done late, you may have to repeat this test.    The labs and medication were already sent in. Please let me know if you have questions.       VISIT SUMMARY:  Today, we discussed the management of your type 1 diabetes, including adjustments to your insulin pump and continuous glucose monitor settings. We also addressed your allergic reactions to adhesive, your upcoming tonsillectomy and adenoidectomy, and your obstructive sleep apnea. Additionally, we reviewed your PCOS management and general health maintenance.    YOUR PLAN:  TYPE 1 DIABETES MELLITUS: Your blood sugar control has improved with increased time in range and reduced insulin usage. We discussed recent hypoglycemic episodes and connectivity issues with your devices.  -Adjust your correction factor: midnight to 23, 7 AM to 21, 7 PM to 23 (see below)  -Place Dexcom and Omnipod on your thigh, ensuring they are at least an inch apart and on a fatty area.  -Perform finger stick checks to ensure the accuracy of Dexcom readings when you change your site.  -We will plan a mock test run of fasting and pump removal before surgery, using Toujeo for basal insulin coverage. I will give you more specific instructions at your follow up appointment    -Schedule an in-person visit for A1c check and preoperative planning on September 16th at 7:30 AM.  -Complete dexamethasone suppression test and urine test before your next appointment.    -  Omnipod 5 in Automode + Dexcom G7 + U500 INSULIN  Time Basal ICR ISF Active Insulin Time Target   MN 1.70 u/hr 6 21 --> 23 5h 110   7A (new profile)   5 19 --> 21       7pm   7.5 21 --> 23           PCOS (POLYCYSTIC OVARY SYNDROME): You have PCOS and are exploring alternative birth control options and treatments for skin and hair issues.  - complete dexamethasone suppression test   -Consult with your gynecologist regarding alternative birth control options without lactose.  -Follow up with a dermatologist for skin and hair treatment related to PCOS.  -Schedule an ultrasound as recommended by your gynecologist.    GENERAL HEALTH MAINTENANCE: You need to complete some routine health checks.  -Schedule and complete a diabetic eye exam at [facility].   This exam is critical to preventing and treating eye conditions caused by diabetes that could potentially lead to vision loss. Once complete, please call your eye care provider and ask them to fax your latest report to our office. This will help us update our records. Our fax number is: [phone]   -Complete routine lab work including A1c and other pending tests.    Contains text generated by Shira     Updated diabetes medication instructions from today:   Diabetic Medications              Insulin Glargine, 2 Unit Dial, (TOUJEO MAX SOLOSTAR) 300 UNIT/ML Subcutaneous Solution Pen-injector (Taking) Use in the event of pump failure- Inject 150 Units into the skin daily.    insulin regular human, conc, 500 UNIT/ML Subcutaneous Solution Pen-injector Use to fill insulin pump with up to 600u per day.    HUMALOG KWIKPEN 100 UNIT/ML Subcutaneous Solution Pen-injector USE IN THE EVENT OF PUMP FAILURE. INJECT 50 UNITS UNDER THE SKIN THREE TIMES DAILY BEFORE A MEAL.    GVOKE  HYPOPEN 1-PACK 1 MG/0.2ML Subcutaneous SUBQ injection             Lab Results   Component Value Date    A1C 7.2 04/12/2025    A1C 7.2 (A) 07/19/2024    A1C 7.4 (A) 03/18/2024    A1C 7.4 (A) 05/23/2023        General follow up information:  Please let us know if you require any refills at least 1 week prior to your medication running out. If you do run out of medication, please call our office ASAP to request refills (do not wait until your follow up).   Please call our office if sugars at home are consistently greater than 250 or less than 70 for medication adjustment (do not wait until your follow up appointment).  Lab results and imaging will typically be reviewed at follow up appointments, or within 3-5 business days of ALL results being in if you do not have an appointment scheduled in the near future. Our office will contact you for any abnormal results requiring more urgent follow up or action.   The on-call pager is for urgent matters only. If you are a type 1 diabetic and run out of insulin after business hours 8AM-4PM, you may call the on-call pager for a refill to a 24 hour pharmacy.  If you have adrenal insufficiency and run out of steroids, you may call the on-call pager for a refill to a 24 hour pharmacy. All other refill requests should be requested during business hours.    Office phone number: [phone]; phones are open Monday-Friday 8:30-4:30.       HOW TO TREAT LOW BLOOD SUGAR (Hypoglycemia)  Low blood sugar= Less than 70, or if you start to have symptoms (below)  Symptoms: Shaking or trembling, fast heart rate, extreme hunger, sweating, confusion/difficulty concentrating, dizziness.    How to treat a low blood sugar if you are able to eat/drink: The Rule of 15/15  If you are using continuous glucose monitor that says you are low, but you do not have any symptoms, verify on fingerstick that your blood sugar is actually low before treating.   Eat 15 grams of carbs (see examples below)  Check your  blood sugar after 15 minutes. If it’s still below your target range, have another serving.   Repeat these steps until it’s in your target range. Once it’s in range, if you're nervous about your sugar going low again, have a protein source (ie, a spoonful of peanut butter).   If you have a CGM you want to look for how your arrow has changed. If you arrow is pointed up or sideways after 15 min, give your CGM more time OR check with a finger stick. Try not to eat more food until at least 15 min after the first BG check - otherwise you risk having a rebound high.  If you are experiencing symptoms and you are unable to check your blood glucose for any reason, treat the hypoglycemia.  If someone has a low blood sugar and is unconscious: Don’t hesitate to call 911. If someone is unconscious and glucagon is not available or someone does not know how to use it, call 911 immediately.     To treat a low, I recommend you carry with you easy, pre-portioned treatment for low blood sugars that are 15G of carbs:   - Children sized squeeze pouch applesauce (high fiber + carbs help prevent too high of a spike)  - Small children's sized juicebox- 15g carb --> 4oz juice box  - Glucose tablets from [employer], you can find them near diabetes supplies --> Note, you will need to eat 3-4 tablets to get to 15g of carbs  - Children sized fruit snack pack- look for one with 15 grams of total carbohydrate  - Choice of how to treat your low is important. Complex carbs, or foods that contain fats along with carbs (like chocolate) can slow the absorption of glucose and should NOT be used to treat an emergency low

## 2025-07-25 NOTE — PROGRESS NOTES
[facility] Note - Telemedicine    Keke Dumont verbally consents to a telehealth visit (video + audio) on 7/25/2025. The visit was conducted in a private room. Patient understands and accepts financial responsibility for any deductible, co-insurance and/or co-pays associated with this service.         The following individual(s) verbally consented to be recorded using ambient AI listening technology and understand that they can each withdraw their consent to this listening technology at any point by asking the clinician to turn off or pause the recording:    Patient name: Keke Dumont       Subjective:   History of Present Illness  eKke Dumont is a 40 year old female with type 1 diabetes who presents for management of her insulin pump and continuous glucose monitor settings.    Glycemic control and insulin pump management  - Type 1 diabetes managed with Omnipod insulin pump and Dexcom continuous glucose monitor (CGM) + U500 insulin  - Connectivity issues with Omnipod and Dexcom, particularly overnight, attributed to sleeping position  - Omnipod site placed on thigh since last office visit, which has helped a lot with absorption  - Daily insulin usage reduced from 88 units to 64 units  - Time in range improved from 46% to 66% since April  - Fewer episodes of severe hyperglycemia post-meal, however still having some early AM lows   - Increased physical activity at work has contributed to improved glucose control    Adhesive-related allergic reactions  - Allergic reactions to Dexcom sensor adhesive, especially on abdomen  - Symptoms include itching and burning at adhesive site  - History of latex allergy  - No relief with various skin preps, including Vivecare Skin Prep  - Flonase used as a barrier without improvement    Obstructive symptoms and planned surgical intervention  - Scheduled for tonsillectomy and adenoidectomy on September 25th due to grade four tonsils with Dr. Brown  - Symptoms  include difficulty eating, drinking, and frequent choking  - Scheduled for CPAP fitting on August 15th  - Anticipates two weeks off work post-surgery    Amenorrhea/PCOS  - Was put on Slynd (progestin only) by gynecologist however hasn't started due to concern that it contains lactose  - does not want to trial IUD again  - has pelvic US ordered  - was diagnosed with PCOS by gyn     DM meds at office visit:   -  Omnipod 5 in Automode + Dexcom G7 + U500 INSULIN  TDD 64u/day (equivalent to 320u/day)    Time Basal ICR ISF Active Insulin Time Target   MN 1.70 u/hr 6 21 5h 110   7A (new profile)   5 19       7pm   7.5 21            Diabetic Medications              Insulin Glargine, 2 Unit Dial, (TOUJEO MAX SOLOSTAR) 300 UNIT/ML Subcutaneous Solution Pen-injector (Taking) Use in the event of pump failure- Inject 150 Units into the skin daily.    insulin regular human, conc, 500 UNIT/ML Subcutaneous Solution Pen-injector Use to fill insulin pump with up to 600u per day.    HUMALOG KWIKPEN 100 UNIT/ML Subcutaneous Solution Pen-injector USE IN THE EVENT OF PUMP FAILURE. INJECT 50 UNITS UNDER THE SKIN THREE TIMES DAILY BEFORE A MEAL.    GVOKE HYPOPEN 1-PACK 1 MG/0.2ML Subcutaneous SUBQ injection             Continuous Glucose Monitoring Interpretation  Keke Dumont has undergone continuous glucose monitoring.  The blood glucose tracings were evaluated for two weeks prior to office visit. Blood glucose tracings demonstrated areas of hyperglycemia post-meal, particularly when carbs were not entered into the pump.   Also some spikes after treating a low which leads to rebound hyperglycemia. There were frequent overnight hypoglycemia particularly if a carb entry was missed- appears to lead to a stacking effect ~5 hours after the carbs were entered, probably from duration of U500,during the weeks of evaluation.  There was also a few hypo events that occurred despite good carb pre-entry (recent adjustment yesterday to ICR to  silvestre)        History/Other:    Allergies, PMH, SocHx and FHx reviewed and updated as appropriate in Epic on Current Medications[1]  Allergies[2]  Current Medications[3]  Past Medical History[4]  Family History[5]  Social history: Reviewed.      ROS/Exam    REVIEW OF SYSTEMS: Ten point review of systems has been performed and is otherwise negative and/or non-contributory, except as described above.     VITALS  Vitals:       There is no height or weight on file to calculate BMI.  Wt Readings from Last 6 Encounters:   06/10/25 208 lb (94.3 kg)   05/16/25 206 lb 1.9 oz (93.5 kg)   08/30/24 197 lb 1.9 oz (89.4 kg)   08/27/24 198 lb 9.6 oz (90.1 kg)   07/19/24 195 lb (88.5 kg)   05/01/24 195 lb (88.5 kg)       PHYSICAL EXAM  Limited due to telemedicine encounter    Constitutional Not in acute distress  Pulmonary: no wheezing heard  No coughing on the phone. Speaking in full sentences   Neurological:  Alert and oriented to person, place and time.   Psychiatric: Normal affect, mood and behavior appropriate    Labs/Imaging: Pertinent imaging reviewed.    Thyroid labs (if present in chart):  Recent Labs     07/19/24  0847 08/19/24  1029 05/24/25  0932   T4F 1.2 1.2 1.1   TSH 5.514* 2.704 2.796        Thyroid ultrasound results (if present in chart):  No results found.    Overall glucose control:  Lab Results   Component Value Date    A1C 7.2 04/12/2025    A1C 7.2 (A) 07/19/2024    A1C 7.4 (A) 03/18/2024    A1C 7.4 (A) 05/23/2023       Supplementary Documentation:   -Surveillance for Diabetes Complications & Risks  Foot exam/neuropathy: No data recorded    Retinopathy screening: Last Dilated Eye Exam: 04/20/24  Eye Exam shows Diabetic Retinopathy?: No    Lipid screening:   Lab Results   Component Value Date     (H) 07/19/2024    TRIG 149 07/19/2024   Cholesterol Meds: atorvastatin Tabs - 10 MG      Nephropathy screening:   Lab Results   Component Value Date    EGFRCR 114 04/12/2025    MICROALBCREA 22.1 07/19/2024    No results found for: \"CREAUR\", \"MICROALBUMIN\", \"MALBCREACALC\"    Blood pressure control:   BP Readings from Last 1 Encounters:   06/10/25 118/72   BP Meds:        Assessment & Plan:   Overview:   1. Type 2 diabetes mellitus with hyperglycemia, with long-term current use of insulin (HCC) (Primary)  Overview:  Diabetes history:   Type 2 diabetes mellitus dx'd age 31- in 2016; CHARITY ab negative.  Utilized Tandem for 2 years (4112-2028).  Had persistent hypoglycemia in morning using U500 in the Tandem pump (more aggressive algorithm).  Started omnipod 5 Jan 13, 2025.     Diabetes care considerations:  - 7/25/2025 more sensitive to adhesives, skin prep (vive care latex free, doesn't help).   - pump backup plan: Toujeo 150u/day, novolog 50u three times daily AC before meals or can use U500 60u/40u/40u before each meal    Previously trialed/failed diabetes meds:  - previously trialed and intolerance of GLP1, ORTEZ, metformin  - discussed actos for insulin sensitization, pt politely declined as she doesn't like oral meds  - no strict CI for SGLT2i, previously discussed- she has frequent yeast infxn and doesn't want to try it   Orders:  -     GLUC MNTR CONT REC FROM NTRSTL TISS FLU PHYS I&R  2. High serum testosterone  -     Cancel: Testosterone,Total and Weakly Bound w/ SHBG; Future; Expected date: 07/25/2025      Assessment & Plan  Type 2 Diabetes Mellitus with severe insulin resistance and hyperglycemia  Currently managed with Omnipod + U500 insulin. Historically was using ~88u/day (equivalent in U100 is 440u/day), after moving to thigh pump site and starting a new job where she is walking more (+ reduced appetite reported), currently using ~64.6u/day (~320u/day) and has vastly better time in range. Continues to have hypoglycemic episodes likely due to delayed carb entry bolus though her ISF may also be different now that she is walking more. Connectivity issues with Dexcom and Omnipod possibly due to placement--  instructed for both to be in 'line of sight' of one another.     - pump settings changed below   - Place Dexcom and Omnipod on the thigh, ensuring at least an inch apart and on subcutaneous tissue  - Perform finger stick checks to ensure accuracy of Dexcom readings with new site  - Upcoming tonsillectomy. Will plan a mock test run of fasting and pump removal before surgery, using Toujeo for basal insulin coverage. Estimating ~80-100u of toujeo; will discuss more at upcoming office visit   - Schedule an in-person visit for A1c check and preoperative planning in Sept     - see above overview for further diabetes hx, see above header \"Supplementary Documentation\" for surveillance for diabetes complications & risks    -  Omnipod 5 in Automode + Dexcom G7 + U500 INSULIN  Time Basal ICR ISF Active Insulin Time Target   MN 1.70 u/hr 6 21 --> 23 5h 110   7A (new profile)   5 19 --> 21       7pm   7.5 21 --> 23           PCOS (Polycystic Ovary Syndrome)  Diagnosed by gynecologist; 12/2024 serum testosterone 150. Due for dexamethasone suppression test.  Was prescribed progesterone-only OCP but was not started as she's concerned re: lactose in it; following with gyn for guidance. Previous IUD use resulted in complications. Dermatological referral made for skin and hair issues.  - Consult with gynecologist re: PCOS/discussed role of progesterone in endometrial protection  - 1x dexamethasone suppression test ordered months ago but did not do due to insurance; will repeat, consider repeat trend of serum T given significant elevation; upcoming pelvic ultrasound   - Follow up with dermatologist for skin and hair treatment related to PCOS.  - Schedule ultrasound as recommended by gynecologist.    General Health Maintenance  Pending diabetic eye exam and routine lab work.  - [facility]  - Complete routine lab work including A1c and other pending tests.    Updated DM med list:    Diabetic Medications              Insulin Glargine, 2 Unit Dial, (TOUJEO MAX SOLOSTAR) 300 UNIT/ML Subcutaneous Solution Pen-injector (Taking) Use in the event of pump failure- Inject 150 Units into the skin daily.    insulin regular human, conc, 500 UNIT/ML Subcutaneous Solution Pen-injector Use to fill insulin pump with up to 600u per day.    HUMALOG KWIKPEN 100 UNIT/ML Subcutaneous Solution Pen-injector USE IN THE EVENT OF PUMP FAILURE. INJECT 50 UNITS UNDER THE SKIN THREE TIMES DAILY BEFORE A MEAL.    GVOKE HYPOPEN 1-PACK 1 MG/0.2ML Subcutaneous SUBQ injection             Return in about 6 weeks (around 9/5/2025) for in person, will make preop plans at that visit.    A total of 40 minutes was spent today on reviewing pump reports, obtaining history, reviewing pertinent labs, evaluating patient, providing multiple treatment options, reinforcing diet/exercise and compliance, and completing documentation.       THEO Treadwell  [facility]   7/25/2025            [1]    Insulin Glargine, 2 Unit Dial, (TOUJEO MAX SOLOSTAR) 300 UNIT/ML Subcutaneous Solution Pen-injector Use in the event of pump failure- Inject 150 Units into the skin daily.     [2]   Allergies  Allergen Reactions    Milk ANAPHYLAXIS    Penicillins HIVES    Aspirin RASH and NAUSEA AND VOMITING    Codeine NAUSEA AND VOMITING    Ibuprofen NAUSEA AND VOMITING    Latex RASH   [3]   Current Outpatient Medications   Medication Sig Dispense Refill    Insulin Glargine, 2 Unit Dial, (TOUJEO MAX SOLOSTAR) 300 UNIT/ML Subcutaneous Solution Pen-injector Use in the event of pump failure- Inject 150 Units into the skin daily.      Insulin Disposable Pump (OMNIPOD 5 [id number] PODS GEN 5) Does not apply Misc 1 Device every 3 (three) days. 10 each 5    levocetirizine 5 MG Oral Tab       ergocalciferol 1.25 MG (42921 UT) Oral Cap Take 1 capsule (50,000 Units total) by mouth twice a week. 26 capsule 1    insulin regular human,  conc, 500 UNIT/ML Subcutaneous Solution Pen-injector Use to fill insulin pump with up to 600u per day. 108 mL 2    atorvastatin 10 MG Oral Tab Take 1 tablet (10 mg total) by mouth nightly.      pregabalin 75 MG Oral Cap Take 1 capsule (75 mg total) by mouth 2 (two) times daily. 180 capsule 1    Lancets Does not apply Misc Use to test BG 2x/day 200 each 3    ALLERGY RELIEF 180 MG Oral Tab Take 1 tablet (180 mg total) by mouth daily.      HUMALOG KWIKPEN 100 UNIT/ML Subcutaneous Solution Pen-injector USE IN THE EVENT OF PUMP FAILURE. INJECT 50 UNITS UNDER THE SKIN THREE TIMES DAILY BEFORE A MEAL.      Continuous Glucose Sensor (DEXCOM G7 SENSOR) Does not apply Misc CHANGE 1 EVERY 10 DAYS 9 each 3    Insulin Disposable Pump (OMNIPOD 5 [id number] INTRO GEN 5) Does not apply Kit 1 each every third day. 1 kit 0    Urine Glucose-Ketones Test In Vitro Strip Use as needed to test for ketones. (Patient not taking: Reported on 2/28/2025) 100 strip 1    Insulin Pen Needle (BD PEN NEEDLE GAVINO U/F) 32G X 4 MM Does not apply Misc Inject 1 Pen into the skin in the morning, at noon, in the evening, and at bedtime. 200 each 1    Mometasone Furo-Formoterol Fum (DULERA) 200-5 MCG/ACT Inhalation Aerosol Inhale 2 puffs into the lungs 2 (two) times daily.      fluticasone propionate 50 MCG/ACT Nasal Suspension 1 spray by Nasal route 2 (two) times daily.      albuterol 108 (90 Base) MCG/ACT Inhalation Aero Soln Inhale 2 puffs into the lungs every 4 (four) hours as needed for Shortness of Breath.      GVOKE HYPOPEN 1-PACK 1 MG/0.2ML Subcutaneous SUBQ injection       SUMAtriptan 50 MG Oral Tab TAKE 1 TABLET BY MOUTH 1 TIME FOR UP TO 1 DOSE AS NEEDED FOR MIGRAINE     [4]   Past Medical History:   Arthritis    Asthma (HCC)    Diabetes (HCC)    Diabetes mellitus (HCC)   [5]   Family History  Problem Relation Age of Onset    No Known Problems Father     Diabetes Mother     Hypertension Mother     Other (hyperthoyrid) Mother

## 2025-08-01 ENCOUNTER — TELEPHONE (OUTPATIENT)
Facility: CLINIC | Age: 40
End: 2025-08-01

## 2025-08-06 ENCOUNTER — TELEPHONE (OUTPATIENT)
Facility: LOCATION | Age: 40
End: 2025-08-06

## 2025-08-12 ENCOUNTER — MED REC SCAN ONLY (OUTPATIENT)
Facility: CLINIC | Age: 40
End: 2025-08-12

## (undated) NOTE — LETTER
Date & Time: 6/9/2021, 12:59 PM  Patient: Paulo Cantu  Encounter Provider(s):    Biju Alexis MD       To Whom It May Concern:    Paulo Cantu was seen and treated in our department on 6/9/2021. She should not return to work until JUNE 12, 2021.

## (undated) NOTE — Clinical Note
Thank you for the referral! She has struggles with symptoms at current dose and is not totally sure if she wants to continue. Does not want to increase. Maybe a zofran prescription? Told her to reach out if she wants to dc

## (undated) NOTE — Clinical Note
Please investigate authorization for adalimumab.  Patient notes she has been on the medication for quite some time.  Please do stat authorization.  Let her know to only restart once she is done with upcoming surgery.  Need to wait 2 weeks after surgery.

## (undated) NOTE — LETTER
Date & Time: 1/24/2022, 10:52 AM  Patient: Paul Mcrae  Encounter Provider(s):    Danette Canales MD       To Whom It May Concern:    Paul Mcrae was seen and treated in our department on 1/24/2022. She can return to work.     If you have any questi

## (undated) NOTE — Clinical Note
She is having surgery on 10/15, will be NPO day prior looking for direction on what to do with her pymp.  I'm meeting with her again next Weds so can tell her the instructions then

## (undated) NOTE — MR AVS SNAPSHOT
After Visit Summary   10/10/2023    Ro Celis   MRN: [id number]           Visit Information     Date & Time  10/10/2023  2:15 PM Provider  [facility]  [facility] Neurodiagnostics Dept. Phone  [phone]      Allergies as of 10/10/2023  Review status set to Review Complete on 9/27/2023       Noted Reaction Type Reactions    Milk 08/19/2023   Systemic ANAPHYLAXIS    Penicillins 11/10/2020    HIVES    Aspirin 11/10/2020    RASH, NAUSEA AND VOMITING    Codeine 11/10/2020    NAUSEA AND VOMITING      Your Current Medications        Dosage    insulin regular human, conc, 500 UNIT/ML Subcutaneous Solution Pen-injector As directed for  units    Continuous Blood Gluc Sensor (DEXCOM G6 SENSOR) Does not apply Misc USE AS DIRECTED AND CHANGE EVERY 10 DAYS    Continuous Blood Gluc Transmit (DEXCOM G6 TRANSMITTER) Does not apply Misc 1 Device every 3 (three) months. Insulin Aspart, w/Niacinamide, (FIASP) 100 UNIT/ML Injection Solution As directed    folic acid 1 MG Oral Tab Take 1 tablet (1 mg total) by mouth daily. fluticasone propionate 50 MCG/ACT Nasal Suspension 1 spray by Nasal route 2 (two) times daily. hydroxychloroquine 200 MG Oral Tab Take 1 tablet (200 mg total) by mouth 2 (two) times daily. amitriptyline 10 MG Oral Tab Take 1 tablet (10 mg total) by mouth nightly for 14 days, THEN 2 tablets (20 mg total) nightly for 14 days, THEN 3 tablets (30 mg total) nightly. Insulin Lispro (HUMALOG KWIKPEN) 200 UNIT/ML Subcutaneous Solution Pen-injector Use as directed up to TDD: 250 units    GVOKE HYPOPEN 1-PACK 1 MG/0.2ML Subcutaneous SUBQ injection INJECT 0.2 ML (1 MG TOTAL) INTO THE SKIN ONCE AS NEEDED. SEVERE HYPOGLYCEMIA    Blood Glucose Monitoring Suppl ([employer] IQ SYSTEM) w/Device Does not apply Kit Use as directed. Blood Glucose Monitoring Suppl ([address]) w/Device Does not apply Kit 1 Device As Directed.     SUMAtriptan 50 MG Oral Tab TAKE 1 TABLET BY MOUTH 1 TIME FOR UP TO 1 DOSE AS NEEDED FOR MIGRAINE    triamcinolone 0.1 % External Ointment 1 Application nightly. DULERA 100-5 MCG/ACT Inhalation Aerosol Inhale 2 puffs into the lungs 2 (two) times daily. ipratropium-albuterol 0.5-2.5 (3) MG/3ML Inhalation Solution Take 3 mL by nebulization. albuterol 108 (90 Base) MCG/ACT Inhalation Aero Soln Inhale into the lungs every 6 (six) hours as needed for Wheezing. Diagnoses for This Visit    Bilateral carpal tunnel syndrome   [684612]    Neuropathy   [889495]             We Ordered the Following     Normal Orders This Visit    EMG/NCV [YYE9 CUSTOM]       Future Appointments        Provider Department    10[facility]11[facility]2023 8[facility] [place]    1/2/2024 8:00 AM [address]    3/18/2024 3:00 PM Miguel Kerr                Did you know that [facility] primary care physicians now offer Video Visits through [address] for adult patients for a variety of conditions such as allergies, back pain and cold symptoms? Skip the drive and waiting room and online chat with a doctor face-to-face using your web-cam enabled computer or mobile device wherever you are. Video Visits cost $50 and can be paid hassle-free using a credit, debit, or health savings card. Not active on [employer]? Ask us how to get signed up today! If you receive a survey from [employer], please take a few minutes to complete it and provide feedback. We strive to deliver the best patient experience and are looking for ways to make improvements. Your feedback will help us do so. For more information on [employer], please visit [email or web address]           No text in SmartText           No text in SmartText